# Patient Record
Sex: FEMALE | Race: WHITE | NOT HISPANIC OR LATINO | Employment: OTHER | RURAL
[De-identification: names, ages, dates, MRNs, and addresses within clinical notes are randomized per-mention and may not be internally consistent; named-entity substitution may affect disease eponyms.]

---

## 2015-09-21 LAB — BMD RECOMMENDATION EXT: NORMAL

## 2017-03-29 LAB — CRC RECOMMENDATION EXT: NORMAL

## 2017-03-31 ENCOUNTER — HISTORICAL (OUTPATIENT)
Dept: ADMINISTRATIVE | Facility: HOSPITAL | Age: 67
End: 2017-03-31

## 2017-04-03 LAB
LAB AP CLINICAL INFORMATION: NORMAL
LAB AP DIAGNOSIS - HISTORICAL: NORMAL
LAB AP GROSS PATHOLOGY - HISTORICAL: NORMAL
LAB AP SPECIMEN SUBMITTED - HISTORICAL: NORMAL

## 2020-03-09 LAB — BMD RECOMMENDATION EXT: NORMAL

## 2021-01-18 ENCOUNTER — HISTORICAL (OUTPATIENT)
Dept: ADMINISTRATIVE | Facility: HOSPITAL | Age: 71
End: 2021-01-18

## 2021-04-28 LAB — BCS RECOMMENDATION EXT: NORMAL

## 2021-08-02 ENCOUNTER — OFFICE VISIT (OUTPATIENT)
Dept: FAMILY MEDICINE | Facility: CLINIC | Age: 71
End: 2021-08-02
Payer: MEDICARE

## 2021-08-02 VITALS
HEART RATE: 90 BPM | DIASTOLIC BLOOD PRESSURE: 69 MMHG | SYSTOLIC BLOOD PRESSURE: 130 MMHG | TEMPERATURE: 98 F | OXYGEN SATURATION: 96 %

## 2021-08-02 DIAGNOSIS — Z20.822 CONTACT WITH AND (SUSPECTED) EXPOSURE TO COVID-19: Primary | ICD-10-CM

## 2021-08-02 DIAGNOSIS — J06.9 UPPER RESPIRATORY TRACT INFECTION, UNSPECIFIED TYPE: ICD-10-CM

## 2021-08-02 DIAGNOSIS — Z03.818 ENCNTR FOR OBS FOR SUSP EXPSR TO OTH BIOLG AGENTS RULED OUT: ICD-10-CM

## 2021-08-02 DIAGNOSIS — J02.9 SORE THROAT: ICD-10-CM

## 2021-08-02 LAB
CTP QC/QA: YES
CTP QC/QA: YES
FLUAV AG NPH QL: NEGATIVE
FLUBV AG NPH QL: NEGATIVE
S PYO RRNA THROAT QL PROBE: NEGATIVE
SARS-COV-2 AG RESP QL IA.RAPID: NEGATIVE

## 2021-08-02 PROCEDURE — 87635 SARS-COV-2 (COVID-19) QUALITATIVE PCR: ICD-10-PCS | Mod: ,,, | Performed by: CLINICAL MEDICAL LABORATORY

## 2021-08-02 PROCEDURE — 87428 SARSCOV & INF VIR A&B AG IA: CPT | Mod: RHCUB | Performed by: FAMILY MEDICINE

## 2021-08-02 PROCEDURE — 87635 SARS-COV-2 COVID-19 AMP PRB: CPT | Mod: ,,, | Performed by: CLINICAL MEDICAL LABORATORY

## 2021-08-02 PROCEDURE — 99213 PR OFFICE/OUTPT VISIT, EST, LEVL III, 20-29 MIN: ICD-10-PCS | Mod: ,,, | Performed by: FAMILY MEDICINE

## 2021-08-02 PROCEDURE — 87880 STREP A ASSAY W/OPTIC: CPT | Mod: RHCUB | Performed by: FAMILY MEDICINE

## 2021-08-02 PROCEDURE — 99213 OFFICE O/P EST LOW 20 MIN: CPT | Mod: ,,, | Performed by: FAMILY MEDICINE

## 2021-08-02 RX ORDER — CILOSTAZOL 50 MG/1
100 TABLET ORAL 2 TIMES DAILY
COMMUNITY
Start: 2021-07-15 | End: 2022-04-05

## 2021-08-02 RX ORDER — ATORVASTATIN CALCIUM 80 MG/1
80 TABLET, FILM COATED ORAL DAILY
COMMUNITY
Start: 2021-05-27 | End: 2022-04-05 | Stop reason: SDUPTHER

## 2021-08-02 RX ORDER — AZITHROMYCIN 250 MG/1
TABLET, FILM COATED ORAL
Qty: 6 TABLET | Refills: 0 | Status: SHIPPED | OUTPATIENT
Start: 2021-08-02 | End: 2021-08-07

## 2021-08-02 RX ORDER — POTASSIUM CHLORIDE 1500 MG/1
20 TABLET, EXTENDED RELEASE ORAL DAILY
COMMUNITY
Start: 2021-06-28 | End: 2022-04-05 | Stop reason: SDUPTHER

## 2021-08-02 RX ORDER — ESCITALOPRAM OXALATE 20 MG/1
20 TABLET ORAL DAILY
COMMUNITY
Start: 2021-06-14 | End: 2021-10-05 | Stop reason: SDUPTHER

## 2021-08-02 RX ORDER — CARVEDILOL 6.25 MG/1
6.25 TABLET ORAL 2 TIMES DAILY
COMMUNITY
Start: 2021-06-28 | End: 2022-04-05 | Stop reason: SDUPTHER

## 2021-08-02 RX ORDER — VALSARTAN 80 MG/1
80 TABLET ORAL DAILY
COMMUNITY
Start: 2021-06-14 | End: 2022-04-05 | Stop reason: SDUPTHER

## 2021-08-02 RX ORDER — METFORMIN HYDROCHLORIDE 500 MG/1
500 TABLET, EXTENDED RELEASE ORAL 2 TIMES DAILY WITH MEALS
COMMUNITY
Start: 2021-07-01 | End: 2021-10-05 | Stop reason: SDUPTHER

## 2021-08-02 RX ORDER — NAPROXEN SODIUM 220 MG/1
TABLET, FILM COATED ORAL
COMMUNITY
Start: 2020-09-25 | End: 2022-05-16 | Stop reason: SDUPTHER

## 2021-08-02 RX ORDER — OMEPRAZOLE 20 MG/1
20 CAPSULE, DELAYED RELEASE ORAL DAILY
COMMUNITY
Start: 2021-05-15 | End: 2022-04-05 | Stop reason: SDUPTHER

## 2021-08-02 RX ORDER — FUROSEMIDE 40 MG/1
40 TABLET ORAL DAILY PRN
COMMUNITY
Start: 2021-07-15 | End: 2022-04-05 | Stop reason: SDUPTHER

## 2021-08-03 PROBLEM — E78.2 MIXED HYPERLIPIDEMIA: Status: ACTIVE | Noted: 2021-08-03

## 2021-08-03 PROBLEM — I10 ESSENTIAL HYPERTENSION: Status: ACTIVE | Noted: 2021-08-03

## 2021-08-03 PROBLEM — K21.9 GASTROESOPHAGEAL REFLUX DISEASE WITHOUT ESOPHAGITIS: Status: ACTIVE | Noted: 2021-08-03

## 2021-08-03 PROBLEM — E11.9 TYPE 2 DIABETES MELLITUS WITHOUT COMPLICATION, WITHOUT LONG-TERM CURRENT USE OF INSULIN: Status: ACTIVE | Noted: 2021-08-03

## 2021-08-03 LAB — SARS-COV-2 RNA RESP QL NAA+PROBE: NEGATIVE

## 2021-10-05 ENCOUNTER — OFFICE VISIT (OUTPATIENT)
Dept: FAMILY MEDICINE | Facility: CLINIC | Age: 71
End: 2021-10-05
Payer: MEDICARE

## 2021-10-05 VITALS
BODY MASS INDEX: 29.73 KG/M2 | RESPIRATION RATE: 16 BRPM | DIASTOLIC BLOOD PRESSURE: 64 MMHG | TEMPERATURE: 97 F | SYSTOLIC BLOOD PRESSURE: 122 MMHG | OXYGEN SATURATION: 94 % | WEIGHT: 185 LBS | HEART RATE: 120 BPM | HEIGHT: 66 IN

## 2021-10-05 DIAGNOSIS — R05.9 COUGH: ICD-10-CM

## 2021-10-05 DIAGNOSIS — J30.1 SEASONAL ALLERGIC RHINITIS DUE TO POLLEN: ICD-10-CM

## 2021-10-05 DIAGNOSIS — R68.83 CHILLS: ICD-10-CM

## 2021-10-05 DIAGNOSIS — I73.9 PAD (PERIPHERAL ARTERY DISEASE): ICD-10-CM

## 2021-10-05 DIAGNOSIS — E78.5 HYPERLIPIDEMIA, UNSPECIFIED HYPERLIPIDEMIA TYPE: ICD-10-CM

## 2021-10-05 DIAGNOSIS — Z20.822 SUSPECTED COVID-19 VIRUS INFECTION: ICD-10-CM

## 2021-10-05 DIAGNOSIS — F32.A DEPRESSION, UNSPECIFIED DEPRESSION TYPE: ICD-10-CM

## 2021-10-05 DIAGNOSIS — E11.9 TYPE 2 DIABETES MELLITUS WITHOUT COMPLICATION, WITHOUT LONG-TERM CURRENT USE OF INSULIN: Primary | ICD-10-CM

## 2021-10-05 DIAGNOSIS — I10 PRIMARY HYPERTENSION: ICD-10-CM

## 2021-10-05 LAB
ALBUMIN SERPL BCP-MCNC: 3.3 G/DL (ref 3.5–5)
ALP SERPL-CCNC: 128 U/L (ref 55–142)
ALT SERPL W P-5'-P-CCNC: 21 U/L (ref 13–56)
ANION GAP SERPL CALCULATED.3IONS-SCNC: 9 MMOL/L (ref 7–16)
AST SERPL W P-5'-P-CCNC: 21 U/L (ref 15–37)
BILIRUB DIRECT SERPL-MCNC: 0.1 MG/DL (ref 0–0.2)
BILIRUB SERPL-MCNC: 0.3 MG/DL (ref 0–1.2)
BUN SERPL-MCNC: 12 MG/DL (ref 7–18)
BUN/CREAT SERPL: 13 (ref 6–20)
CALCIUM SERPL-MCNC: 9.1 MG/DL (ref 8.5–10.1)
CHLORIDE SERPL-SCNC: 107 MMOL/L (ref 98–107)
CHOLEST SERPL-MCNC: 126 MG/DL (ref 0–200)
CHOLEST/HDLC SERPL: 1.8 {RATIO}
CO2 SERPL-SCNC: 27 MMOL/L (ref 21–32)
CREAT SERPL-MCNC: 0.94 MG/DL (ref 0.55–1.02)
CTP QC/QA: YES
EST. AVERAGE GLUCOSE BLD GHB EST-MCNC: 184 MG/DL
FLUAV AG NPH QL: NEGATIVE
FLUBV AG NPH QL: NEGATIVE
GLUCOSE SERPL-MCNC: 274 MG/DL (ref 74–106)
HBA1C MFR BLD HPLC: 8.1 % (ref 4.5–6.6)
HDLC SERPL-MCNC: 72 MG/DL (ref 40–60)
LDLC SERPL CALC-MCNC: 26 MG/DL
LDLC/HDLC SERPL: 0.4 {RATIO}
NONHDLC SERPL-MCNC: 54 MG/DL
POTASSIUM SERPL-SCNC: 3.5 MMOL/L (ref 3.5–5.1)
PROT SERPL-MCNC: 6.9 G/DL (ref 6.4–8.2)
SARS-COV-2 AG RESP QL IA.RAPID: NEGATIVE
SODIUM SERPL-SCNC: 139 MMOL/L (ref 136–145)
TRIGL SERPL-MCNC: 139 MG/DL (ref 35–150)
VLDLC SERPL-MCNC: 28 MG/DL

## 2021-10-05 PROCEDURE — 80053 COMPREHEN METABOLIC PANEL: CPT | Mod: ,,, | Performed by: CLINICAL MEDICAL LABORATORY

## 2021-10-05 PROCEDURE — 99214 PR OFFICE/OUTPT VISIT, EST, LEVL IV, 30-39 MIN: ICD-10-PCS | Mod: ,,, | Performed by: INTERNAL MEDICINE

## 2021-10-05 PROCEDURE — 82248 HEPATIC FUNCTION PANEL: ICD-10-PCS | Mod: ,,, | Performed by: CLINICAL MEDICAL LABORATORY

## 2021-10-05 PROCEDURE — 80061 LIPID PANEL: ICD-10-PCS | Mod: ,,, | Performed by: CLINICAL MEDICAL LABORATORY

## 2021-10-05 PROCEDURE — 87428 SARSCOV & INF VIR A&B AG IA: CPT | Mod: RHCUB | Performed by: INTERNAL MEDICINE

## 2021-10-05 PROCEDURE — 83036 HEMOGLOBIN A1C: ICD-10-PCS | Mod: ,,, | Performed by: CLINICAL MEDICAL LABORATORY

## 2021-10-05 PROCEDURE — 82248 BILIRUBIN DIRECT: CPT | Mod: ,,, | Performed by: CLINICAL MEDICAL LABORATORY

## 2021-10-05 PROCEDURE — 80061 LIPID PANEL: CPT | Mod: ,,, | Performed by: CLINICAL MEDICAL LABORATORY

## 2021-10-05 PROCEDURE — 99214 OFFICE O/P EST MOD 30 MIN: CPT | Mod: ,,, | Performed by: INTERNAL MEDICINE

## 2021-10-05 PROCEDURE — 80053 HEPATIC FUNCTION PANEL: ICD-10-PCS | Mod: ,,, | Performed by: CLINICAL MEDICAL LABORATORY

## 2021-10-05 PROCEDURE — 83036 HEMOGLOBIN GLYCOSYLATED A1C: CPT | Mod: ,,, | Performed by: CLINICAL MEDICAL LABORATORY

## 2021-10-05 RX ORDER — METFORMIN HYDROCHLORIDE 500 MG/1
500 TABLET, EXTENDED RELEASE ORAL 2 TIMES DAILY WITH MEALS
Qty: 180 TABLET | Refills: 1 | Status: SHIPPED | OUTPATIENT
Start: 2021-10-05 | End: 2022-03-28 | Stop reason: SDUPTHER

## 2021-10-05 RX ORDER — ESCITALOPRAM OXALATE 20 MG/1
20 TABLET ORAL DAILY
Qty: 90 TABLET | Refills: 1 | Status: SHIPPED | OUTPATIENT
Start: 2021-10-05 | End: 2022-03-28 | Stop reason: SDUPTHER

## 2021-10-05 RX ORDER — METHYLPREDNISOLONE 4 MG/1
TABLET ORAL
Qty: 1 PACKAGE | Refills: 0 | Status: SHIPPED | OUTPATIENT
Start: 2021-10-05 | End: 2022-04-05 | Stop reason: ALTCHOICE

## 2022-03-11 DIAGNOSIS — Z71.89 COMPLEX CARE COORDINATION: ICD-10-CM

## 2022-03-28 DIAGNOSIS — F32.A DEPRESSION, UNSPECIFIED DEPRESSION TYPE: ICD-10-CM

## 2022-03-28 DIAGNOSIS — E11.9 TYPE 2 DIABETES MELLITUS WITHOUT COMPLICATION, WITHOUT LONG-TERM CURRENT USE OF INSULIN: ICD-10-CM

## 2022-03-28 RX ORDER — ESCITALOPRAM OXALATE 20 MG/1
20 TABLET ORAL DAILY
Qty: 90 TABLET | Refills: 1 | Status: SHIPPED | OUTPATIENT
Start: 2022-03-28 | End: 2022-04-05 | Stop reason: SDUPTHER

## 2022-03-28 RX ORDER — METFORMIN HYDROCHLORIDE 500 MG/1
500 TABLET, EXTENDED RELEASE ORAL 2 TIMES DAILY WITH MEALS
Qty: 180 TABLET | Refills: 1 | Status: SHIPPED | OUTPATIENT
Start: 2022-03-28 | End: 2022-04-05 | Stop reason: SDUPTHER

## 2022-04-05 ENCOUNTER — OFFICE VISIT (OUTPATIENT)
Dept: FAMILY MEDICINE | Facility: CLINIC | Age: 72
End: 2022-04-05
Payer: MEDICARE

## 2022-04-05 VITALS
BODY MASS INDEX: 30.67 KG/M2 | SYSTOLIC BLOOD PRESSURE: 138 MMHG | HEART RATE: 116 BPM | DIASTOLIC BLOOD PRESSURE: 70 MMHG | RESPIRATION RATE: 16 BRPM | TEMPERATURE: 97 F | OXYGEN SATURATION: 96 % | WEIGHT: 190 LBS

## 2022-04-05 DIAGNOSIS — Z87.891 PERSONAL HISTORY OF NICOTINE DEPENDENCE: ICD-10-CM

## 2022-04-05 DIAGNOSIS — F17.200 SMOKER: ICD-10-CM

## 2022-04-05 DIAGNOSIS — Z86.010 HISTORY OF COLON POLYPS: ICD-10-CM

## 2022-04-05 DIAGNOSIS — Z11.59 ENCOUNTER FOR HEPATITIS C SCREENING TEST FOR LOW RISK PATIENT: ICD-10-CM

## 2022-04-05 DIAGNOSIS — E11.9 TYPE 2 DIABETES MELLITUS WITHOUT COMPLICATION, WITHOUT LONG-TERM CURRENT USE OF INSULIN: Primary | ICD-10-CM

## 2022-04-05 DIAGNOSIS — E87.6 HYPOKALEMIA: ICD-10-CM

## 2022-04-05 DIAGNOSIS — I25.10 CORONARY ARTERY DISEASE INVOLVING NATIVE CORONARY ARTERY OF NATIVE HEART WITHOUT ANGINA PECTORIS: ICD-10-CM

## 2022-04-05 DIAGNOSIS — K21.9 GASTROESOPHAGEAL REFLUX DISEASE WITHOUT ESOPHAGITIS: ICD-10-CM

## 2022-04-05 DIAGNOSIS — F32.A DEPRESSION, UNSPECIFIED DEPRESSION TYPE: ICD-10-CM

## 2022-04-05 DIAGNOSIS — I10 ESSENTIAL HYPERTENSION: ICD-10-CM

## 2022-04-05 DIAGNOSIS — E78.5 HYPERLIPIDEMIA, UNSPECIFIED HYPERLIPIDEMIA TYPE: ICD-10-CM

## 2022-04-05 LAB
CREAT UR-MCNC: 108 MG/DL (ref 28–219)
EST. AVERAGE GLUCOSE BLD GHB EST-MCNC: 157 MG/DL
HBA1C MFR BLD HPLC: 7.3 % (ref 4.5–6.6)
MICROALBUMIN UR-MCNC: 1.2 MG/DL (ref 0–2.8)
MICROALBUMIN/CREAT RATIO PNL UR: 11.1 MG/G (ref 0–30)

## 2022-04-05 PROCEDURE — 82043 MICROALBUMIN / CREATININE RATIO URINE: ICD-10-PCS | Mod: ,,, | Performed by: CLINICAL MEDICAL LABORATORY

## 2022-04-05 PROCEDURE — 86803 HEPATITIS C AB TEST: CPT | Mod: ,,, | Performed by: CLINICAL MEDICAL LABORATORY

## 2022-04-05 PROCEDURE — 99214 OFFICE O/P EST MOD 30 MIN: CPT | Mod: ,,, | Performed by: INTERNAL MEDICINE

## 2022-04-05 PROCEDURE — 82570 MICROALBUMIN / CREATININE RATIO URINE: ICD-10-PCS | Mod: ,,, | Performed by: CLINICAL MEDICAL LABORATORY

## 2022-04-05 PROCEDURE — 83036 HEMOGLOBIN A1C: ICD-10-PCS | Mod: ,,, | Performed by: CLINICAL MEDICAL LABORATORY

## 2022-04-05 PROCEDURE — 86803 HEPATITIS C ANTIBODY: ICD-10-PCS | Mod: ,,, | Performed by: CLINICAL MEDICAL LABORATORY

## 2022-04-05 PROCEDURE — 82570 ASSAY OF URINE CREATININE: CPT | Mod: ,,, | Performed by: CLINICAL MEDICAL LABORATORY

## 2022-04-05 PROCEDURE — 83036 HEMOGLOBIN GLYCOSYLATED A1C: CPT | Mod: ,,, | Performed by: CLINICAL MEDICAL LABORATORY

## 2022-04-05 PROCEDURE — 99214 PR OFFICE/OUTPT VISIT, EST, LEVL IV, 30-39 MIN: ICD-10-PCS | Mod: ,,, | Performed by: INTERNAL MEDICINE

## 2022-04-05 PROCEDURE — 82043 UR ALBUMIN QUANTITATIVE: CPT | Mod: ,,, | Performed by: CLINICAL MEDICAL LABORATORY

## 2022-04-05 RX ORDER — SPIRONOLACTONE 25 MG/1
25 TABLET ORAL DAILY PRN
COMMUNITY
Start: 2022-02-23 | End: 2022-04-05 | Stop reason: SDUPTHER

## 2022-04-05 RX ORDER — OMEPRAZOLE 20 MG/1
20 CAPSULE, DELAYED RELEASE ORAL DAILY
Qty: 90 CAPSULE | Refills: 1 | Status: SHIPPED | OUTPATIENT
Start: 2022-04-05 | End: 2023-04-12 | Stop reason: SDUPTHER

## 2022-04-05 RX ORDER — CARVEDILOL 6.25 MG/1
6.25 TABLET ORAL 2 TIMES DAILY
Qty: 180 TABLET | Refills: 1 | Status: SHIPPED | OUTPATIENT
Start: 2022-04-05 | End: 2023-04-12 | Stop reason: SDUPTHER

## 2022-04-05 RX ORDER — METFORMIN HYDROCHLORIDE 500 MG/1
500 TABLET, EXTENDED RELEASE ORAL 2 TIMES DAILY WITH MEALS
Qty: 180 TABLET | Refills: 1 | Status: SHIPPED | OUTPATIENT
Start: 2022-04-05 | End: 2023-04-12 | Stop reason: SDUPTHER

## 2022-04-05 RX ORDER — POTASSIUM CHLORIDE 1500 MG/1
20 TABLET, EXTENDED RELEASE ORAL DAILY
Qty: 90 TABLET | Refills: 1 | Status: SHIPPED | OUTPATIENT
Start: 2022-04-05 | End: 2023-04-12 | Stop reason: SDUPTHER

## 2022-04-05 RX ORDER — CILOSTAZOL 100 MG/1
100 TABLET ORAL 2 TIMES DAILY
Qty: 180 TABLET | Refills: 1 | Status: SHIPPED | OUTPATIENT
Start: 2022-04-05 | End: 2023-04-12 | Stop reason: SDUPTHER

## 2022-04-05 RX ORDER — CILOSTAZOL 100 MG/1
100 TABLET ORAL 2 TIMES DAILY
COMMUNITY
Start: 2022-03-14 | End: 2022-04-05 | Stop reason: SDUPTHER

## 2022-04-05 RX ORDER — VALSARTAN 80 MG/1
80 TABLET ORAL DAILY
Qty: 90 TABLET | Refills: 1 | Status: SHIPPED | OUTPATIENT
Start: 2022-04-05 | End: 2023-04-12 | Stop reason: SDUPTHER

## 2022-04-05 RX ORDER — ATORVASTATIN CALCIUM 80 MG/1
80 TABLET, FILM COATED ORAL DAILY
Qty: 90 TABLET | Refills: 1 | Status: SHIPPED | OUTPATIENT
Start: 2022-04-05 | End: 2023-04-12 | Stop reason: SDUPTHER

## 2022-04-05 RX ORDER — SPIRONOLACTONE 25 MG/1
25 TABLET ORAL DAILY PRN
Qty: 90 TABLET | Refills: 1 | Status: SHIPPED | OUTPATIENT
Start: 2022-04-05

## 2022-04-05 RX ORDER — FUROSEMIDE 40 MG/1
40 TABLET ORAL DAILY PRN
Qty: 90 TABLET | Refills: 1 | Status: SHIPPED | OUTPATIENT
Start: 2022-04-05 | End: 2023-04-12

## 2022-04-05 RX ORDER — ESCITALOPRAM OXALATE 20 MG/1
20 TABLET ORAL DAILY
Qty: 90 TABLET | Refills: 1 | Status: SHIPPED | OUTPATIENT
Start: 2022-04-05 | End: 2023-04-12 | Stop reason: SDUPTHER

## 2022-04-05 NOTE — PROGRESS NOTES
New Clinic Note    Patient Name:  Maddie Ya is a 71 y.o. female     Chief Complaint:    Chief Complaint   Patient presents with    Follow-up     Patient is here for her checkup today. She asks about getting a low-dose lung CT scheduled since she is a smoker.     Did not bring meds        Subjective  HPI         Current Outpatient Medications:     aspirin 81 MG Chew, aspirin 81 mg chewable tablet  Chew 1 tablet every day by oral route., Disp: , Rfl:     atorvastatin (LIPITOR) 80 MG tablet, Take 1 tablet (80 mg total) by mouth once daily., Disp: 90 tablet, Rfl: 1    carvediloL (COREG) 6.25 MG tablet, Take 1 tablet (6.25 mg total) by mouth 2 (two) times daily., Disp: 180 tablet, Rfl: 1    cilostazoL (PLETAL) 100 MG Tab, Take 1 tablet (100 mg total) by mouth 2 (two) times daily., Disp: 180 tablet, Rfl: 1    EScitalopram oxalate (LEXAPRO) 20 MG tablet, Take 1 tablet (20 mg total) by mouth once daily., Disp: 90 tablet, Rfl: 1    furosemide (LASIX) 40 MG tablet, Take 1 tablet (40 mg total) by mouth daily as needed (swelling)., Disp: 90 tablet, Rfl: 1    metFORMIN (GLUCOPHAGE-XR) 500 MG ER 24hr tablet, Take 1 tablet (500 mg total) by mouth 2 (two) times daily with meals., Disp: 180 tablet, Rfl: 1    omeprazole (PRILOSEC) 20 MG capsule, Take 1 capsule (20 mg total) by mouth once daily., Disp: 90 capsule, Rfl: 1    potassium chloride (K-TAB) 20 mEq, Take 1 tablet (20 mEq total) by mouth once daily., Disp: 90 tablet, Rfl: 1    spironolactone (ALDACTONE) 25 MG tablet, Take 1 tablet (25 mg total) by mouth daily as needed (swelling)., Disp: 90 tablet, Rfl: 1    valsartan (DIOVAN) 80 MG tablet, Take 1 tablet (80 mg total) by mouth once daily., Disp: 90 tablet, Rfl: 1   Past Medical History:   Diagnosis Date    B12 deficiency     Bilateral carotid bruits     CAD (coronary artery disease)     sees Halina    CHF (congestive heart failure)     CVA (cerebral vascular accident)     Depression     Diabetes  mellitus, type 2     GERD (gastroesophageal reflux disease)     History of colon polyps     Hyperlipidemia     Hypertension     Ischemic cardiomyopathy     Lumbar disc disease     OA (osteoarthritis)     PAD (peripheral artery disease)       Past Surgical History:   Procedure Laterality Date    ANKLE SURGERY      APPENDECTOMY      BACK SURGERY      CHOLECYSTECTOMY      HYSTERECTOMY      TONSILLECTOMY        History reviewed. No pertinent family history.   Social History     Tobacco Use    Smoking status: Current Every Day Smoker    Smokeless tobacco: Never Used   Substance Use Topics    Alcohol use: Never    Drug use: Never        Review of Systems   Constitutional: Negative for fatigue and fever.   HENT: Negative for nasal congestion, rhinorrhea and sore throat.    Eyes: Negative for visual disturbance.   Respiratory: Negative for cough, chest tightness, shortness of breath and wheezing.    Cardiovascular: Negative for chest pain, palpitations and leg swelling.   Gastrointestinal: Negative for abdominal pain, blood in stool, nausea and vomiting.   Genitourinary: Negative for dysuria and hematuria.   Musculoskeletal: Negative for back pain and neck pain.   Integumentary:  Negative for rash and mole/lesion.   Neurological: Negative for dizziness, vertigo, headaches and memory loss.   Hematological: Negative for adenopathy.   Psychiatric/Behavioral: Negative for confusion. The patient is not nervous/anxious.         Objective:  /70 (BP Location: Left arm, Patient Position: Sitting)   Pulse (!) 116   Temp 97 °F (36.1 °C) (Temporal)   Resp 16   Wt 86.2 kg (190 lb)   SpO2 96%   BMI 30.67 kg/m²      Physical Exam  Constitutional:       Appearance: Normal appearance.   HENT:      Head: Normocephalic and atraumatic.      Right Ear: External ear normal.      Left Ear: External ear normal.      Nose: Nose normal.   Eyes:      Extraocular Movements: Extraocular movements intact.       Conjunctiva/sclera: Conjunctivae normal.      Pupils: Pupils are equal, round, and reactive to light.   Cardiovascular:      Rate and Rhythm: Regular rhythm. Tachycardia present.      Pulses: Normal pulses.      Heart sounds: Normal heart sounds. No murmur heard.    No friction rub. No gallop.   Pulmonary:      Effort: Pulmonary effort is normal.      Breath sounds: No wheezing, rhonchi or rales.   Abdominal:      General: Bowel sounds are normal.      Palpations: There is no mass.      Tenderness: There is no abdominal tenderness.   Musculoskeletal:         General: No swelling.      Cervical back: Normal range of motion and neck supple.   Skin:     General: Skin is warm.      Findings: No lesion or rash.   Neurological:      General: No focal deficit present.      Mental Status: She is alert and oriented to person, place, and time.   Psychiatric:         Mood and Affect: Mood normal.          Assessment and Plan    Type 2 diabetes mellitus without complication, without long-term current use of insulin  -     metFORMIN (GLUCOPHAGE-XR) 500 MG ER 24hr tablet; Take 1 tablet (500 mg total) by mouth 2 (two) times daily with meals.  Dispense: 180 tablet; Refill: 1  -     Hemoglobin A1C; Future; Expected date: 04/05/2022  -     Microalbumin/Creatinine Ratio, Urine    Depression, unspecified depression type  -     EScitalopram oxalate (LEXAPRO) 20 MG tablet; Take 1 tablet (20 mg total) by mouth once daily.  Dispense: 90 tablet; Refill: 1    Essential hypertension  -     valsartan (DIOVAN) 80 MG tablet; Take 1 tablet (80 mg total) by mouth once daily.  Dispense: 90 tablet; Refill: 1  -     spironolactone (ALDACTONE) 25 MG tablet; Take 1 tablet (25 mg total) by mouth daily as needed (swelling).  Dispense: 90 tablet; Refill: 1  -     furosemide (LASIX) 40 MG tablet; Take 1 tablet (40 mg total) by mouth daily as needed (swelling).  Dispense: 90 tablet; Refill: 1  -     carvediloL (COREG) 6.25 MG tablet; Take 1 tablet (6.25 mg  total) by mouth 2 (two) times daily.  Dispense: 180 tablet; Refill: 1    Hyperlipidemia, unspecified hyperlipidemia type  -     atorvastatin (LIPITOR) 80 MG tablet; Take 1 tablet (80 mg total) by mouth once daily.  Dispense: 90 tablet; Refill: 1    Coronary artery disease involving native coronary artery of native heart without angina pectoris  -     cilostazoL (PLETAL) 100 MG Tab; Take 1 tablet (100 mg total) by mouth 2 (two) times daily.  Dispense: 180 tablet; Refill: 1    Gastroesophageal reflux disease without esophagitis  -     omeprazole (PRILOSEC) 20 MG capsule; Take 1 capsule (20 mg total) by mouth once daily.  Dispense: 90 capsule; Refill: 1    Hypokalemia  -     potassium chloride (K-TAB) 20 mEq; Take 1 tablet (20 mEq total) by mouth once daily.  Dispense: 90 tablet; Refill: 1    History of colon polyps  -     Ambulatory referral/consult to Gastroenterology; Future; Expected date: 04/12/2022    Encounter for hepatitis C screening test for low risk patient  -     Hepatitis C Antibody; Future; Expected date: 04/05/2022    Smoker    Personal history of nicotine dependence   -     CT Chest Lung Screening Low Dose; Future; Expected date: 04/05/2022         Problem List Items Addressed This Visit        Psychiatric    Depression    Relevant Medications    EScitalopram oxalate (LEXAPRO) 20 MG tablet       Cardiac/Vascular    Essential hypertension    Relevant Medications    valsartan (DIOVAN) 80 MG tablet    spironolactone (ALDACTONE) 25 MG tablet    furosemide (LASIX) 40 MG tablet    carvediloL (COREG) 6.25 MG tablet    Hyperlipidemia    Relevant Medications    atorvastatin (LIPITOR) 80 MG tablet    CAD (coronary artery disease)    Relevant Medications    cilostazoL (PLETAL) 100 MG Tab       Endocrine    Type 2 diabetes mellitus without complication, without long-term current use of insulin - Primary    Relevant Medications    metFORMIN (GLUCOPHAGE-XR) 500 MG ER 24hr tablet    Other Relevant Orders    Hemoglobin  A1C    Microalbumin/Creatinine Ratio, Urine       GI    Gastroesophageal reflux disease without esophagitis    Relevant Medications    omeprazole (PRILOSEC) 20 MG capsule    History of colon polyps    Relevant Orders    Ambulatory referral/consult to Gastroenterology      Other Visit Diagnoses     Hypokalemia        Relevant Medications    potassium chloride (K-TAB) 20 mEq    Encounter for hepatitis C screening test for low risk patient        Relevant Orders    Hepatitis C Antibody    Smoker        Personal history of nicotine dependence         Relevant Orders    CT Chest Lung Screening Low Dose         Get cardiology records from recent visit   HEDY due in 2025  Had polyps on C-scope 2017 and has a fam hx of colon cancer  Follow up in about 6 months (around 10/5/2022).

## 2022-04-06 LAB — HCV AB SER QL: NORMAL

## 2022-05-10 ENCOUNTER — HOSPITAL ENCOUNTER (OUTPATIENT)
Dept: RADIOLOGY | Facility: HOSPITAL | Age: 72
Discharge: HOME | End: 2022-05-10
Attending: INTERNAL MEDICINE
Payer: MEDICARE

## 2022-05-10 DIAGNOSIS — Z87.891 PERSONAL HISTORY OF NICOTINE DEPENDENCE: ICD-10-CM

## 2022-05-10 PROCEDURE — 71271 CT CHEST LUNG SCREENING LOW DOSE: ICD-10-PCS | Mod: 26,,, | Performed by: RADIOLOGY

## 2022-05-10 PROCEDURE — 71271 CT THORAX LUNG CANCER SCR C-: CPT | Mod: TC

## 2022-05-10 PROCEDURE — 71271 CT THORAX LUNG CANCER SCR C-: CPT | Mod: 26,,, | Performed by: RADIOLOGY

## 2022-05-16 ENCOUNTER — OFFICE VISIT (OUTPATIENT)
Dept: FAMILY MEDICINE | Facility: CLINIC | Age: 72
End: 2022-05-16
Payer: MEDICARE

## 2022-05-16 VITALS
WEIGHT: 186.19 LBS | HEIGHT: 66 IN | TEMPERATURE: 99 F | BODY MASS INDEX: 29.92 KG/M2 | DIASTOLIC BLOOD PRESSURE: 70 MMHG | HEART RATE: 100 BPM | SYSTOLIC BLOOD PRESSURE: 112 MMHG | OXYGEN SATURATION: 97 % | RESPIRATION RATE: 19 BRPM

## 2022-05-16 DIAGNOSIS — R50.9 FEVER, UNSPECIFIED FEVER CAUSE: Primary | ICD-10-CM

## 2022-05-16 PROBLEM — E78.5 DYSLIPIDEMIA: Status: ACTIVE | Noted: 2022-05-16

## 2022-05-16 PROBLEM — I73.9 PERIPHERAL VASCULAR DISEASE: Status: ACTIVE | Noted: 2022-05-16

## 2022-05-16 PROBLEM — L57.0 ACTINIC KERATOSES: Status: ACTIVE | Noted: 2019-03-07

## 2022-05-16 PROBLEM — J44.9 MILD CHRONIC OBSTRUCTIVE PULMONARY DISEASE: Status: ACTIVE | Noted: 2022-05-16

## 2022-05-16 PROBLEM — Z87.39 HISTORY OF DEGENERATIVE DISC DISEASE: Status: ACTIVE | Noted: 2019-03-07

## 2022-05-16 LAB
ALBUMIN SERPL BCP-MCNC: 3.2 G/DL (ref 3.5–5)
ALBUMIN/GLOB SERPL: 1 {RATIO}
ALP SERPL-CCNC: 114 U/L (ref 55–142)
ALT SERPL W P-5'-P-CCNC: 21 U/L (ref 13–56)
ANION GAP SERPL CALCULATED.3IONS-SCNC: 8 MMOL/L (ref 7–16)
AST SERPL W P-5'-P-CCNC: 18 U/L (ref 15–37)
BASOPHILS # BLD AUTO: 0.03 K/UL (ref 0–0.2)
BASOPHILS NFR BLD AUTO: 0.4 % (ref 0–1)
BILIRUB SERPL-MCNC: 0.3 MG/DL (ref 0–1.2)
BUN SERPL-MCNC: 11 MG/DL (ref 7–18)
BUN/CREAT SERPL: 16 (ref 6–20)
CALCIUM SERPL-MCNC: 8.3 MG/DL (ref 8.5–10.1)
CHLORIDE SERPL-SCNC: 108 MMOL/L (ref 98–107)
CO2 SERPL-SCNC: 28 MMOL/L (ref 21–32)
CREAT SERPL-MCNC: 0.7 MG/DL (ref 0.55–1.02)
CTP QC/QA: YES
CTP QC/QA: YES
DIFFERENTIAL METHOD BLD: ABNORMAL
EOSINOPHIL # BLD AUTO: 0.1 K/UL (ref 0–0.5)
EOSINOPHIL NFR BLD AUTO: 1.3 % (ref 1–4)
ERYTHROCYTE [DISTWIDTH] IN BLOOD BY AUTOMATED COUNT: 18.9 % (ref 11.5–14.5)
FLUAV AG NPH QL: NEGATIVE
FLUBV AG NPH QL: NEGATIVE
GLOBULIN SER-MCNC: 3.2 G/DL (ref 2–4)
GLUCOSE SERPL-MCNC: 185 MG/DL (ref 74–106)
HCT VFR BLD AUTO: 34.1 % (ref 38–47)
HETEROPH AB SER QL LA: NEGATIVE
HGB BLD-MCNC: 10.3 G/DL (ref 12–16)
IMM GRANULOCYTES # BLD AUTO: 0.01 K/UL (ref 0–0.04)
IMM GRANULOCYTES NFR BLD: 0.1 % (ref 0–0.4)
LYMPHOCYTES # BLD AUTO: 2.61 K/UL (ref 1–4.8)
LYMPHOCYTES NFR BLD AUTO: 34.5 % (ref 27–41)
MCH RBC QN AUTO: 24 PG (ref 27–31)
MCHC RBC AUTO-ENTMCNC: 30.2 G/DL (ref 32–36)
MCV RBC AUTO: 79.3 FL (ref 80–96)
MONOCYTES # BLD AUTO: 0.68 K/UL (ref 0–0.8)
MONOCYTES NFR BLD AUTO: 9 % (ref 2–6)
MPC BLD CALC-MCNC: 9.6 FL (ref 9.4–12.4)
NEUTROPHILS # BLD AUTO: 4.13 K/UL (ref 1.8–7.7)
NEUTROPHILS NFR BLD AUTO: 54.7 % (ref 53–65)
NRBC # BLD AUTO: 0 X10E3/UL
NRBC, AUTO (.00): 0 %
PLATELET # BLD AUTO: 282 K/UL (ref 150–400)
POTASSIUM SERPL-SCNC: 3.9 MMOL/L (ref 3.5–5.1)
PROT SERPL-MCNC: 6.4 G/DL (ref 6.4–8.2)
RBC # BLD AUTO: 4.3 M/UL (ref 4.2–5.4)
S PYO RRNA THROAT QL PROBE: NEGATIVE
SARS-COV-2 AG RESP QL IA.RAPID: NEGATIVE
SODIUM SERPL-SCNC: 140 MMOL/L (ref 136–145)
WBC # BLD AUTO: 7.56 K/UL (ref 4.5–11)

## 2022-05-16 PROCEDURE — 87880 STREP A ASSAY W/OPTIC: CPT | Mod: RHCUB | Performed by: FAMILY MEDICINE

## 2022-05-16 PROCEDURE — 85025 CBC WITH DIFFERENTIAL: ICD-10-PCS | Mod: ,,, | Performed by: CLINICAL MEDICAL LABORATORY

## 2022-05-16 PROCEDURE — 99213 PR OFFICE/OUTPT VISIT, EST, LEVL III, 20-29 MIN: ICD-10-PCS | Mod: ,,, | Performed by: FAMILY MEDICINE

## 2022-05-16 PROCEDURE — 99213 OFFICE O/P EST LOW 20 MIN: CPT | Mod: ,,, | Performed by: FAMILY MEDICINE

## 2022-05-16 PROCEDURE — 86308 HETEROPHILE AB SCREEN: ICD-10-PCS | Mod: ,,, | Performed by: CLINICAL MEDICAL LABORATORY

## 2022-05-16 PROCEDURE — 80053 COMPREHENSIVE METABOLIC PANEL: ICD-10-PCS | Mod: ,,, | Performed by: CLINICAL MEDICAL LABORATORY

## 2022-05-16 PROCEDURE — 87428 SARSCOV & INF VIR A&B AG IA: CPT | Mod: RHCUB | Performed by: FAMILY MEDICINE

## 2022-05-16 PROCEDURE — 85025 COMPLETE CBC W/AUTO DIFF WBC: CPT | Mod: ,,, | Performed by: CLINICAL MEDICAL LABORATORY

## 2022-05-16 PROCEDURE — 80053 COMPREHEN METABOLIC PANEL: CPT | Mod: ,,, | Performed by: CLINICAL MEDICAL LABORATORY

## 2022-05-16 PROCEDURE — 86308 HETEROPHILE ANTIBODY SCREEN: CPT | Mod: ,,, | Performed by: CLINICAL MEDICAL LABORATORY

## 2022-05-16 RX ORDER — ASPIRIN 81 MG/1
1 TABLET ORAL DAILY
COMMUNITY
End: 2023-04-12 | Stop reason: ALTCHOICE

## 2022-05-16 RX ORDER — CYCLOBENZAPRINE HCL 10 MG
1 TABLET ORAL NIGHTLY PRN
COMMUNITY
End: 2022-06-09 | Stop reason: SDUPTHER

## 2022-05-16 RX ORDER — FLUTICASONE PROPIONATE 50 MCG
1 SPRAY, SUSPENSION (ML) NASAL DAILY
COMMUNITY
End: 2023-04-12 | Stop reason: SDUPTHER

## 2022-05-16 RX ORDER — CYANOCOBALAMIN 1000 UG/ML
1 INJECTION, SOLUTION INTRAMUSCULAR; SUBCUTANEOUS
COMMUNITY
End: 2022-05-23 | Stop reason: SDUPTHER

## 2022-05-16 RX ORDER — CETIRIZINE HYDROCHLORIDE 10 MG/1
1 TABLET ORAL DAILY
COMMUNITY
End: 2023-04-12 | Stop reason: SDUPTHER

## 2022-05-16 NOTE — PROGRESS NOTES
Clinic Note    Patient Name: Maddie Ya  : 1950  MRN: [id number]    HPI:    Chief Complaint   Patient presents with    Fever    Fatigue     Low grade fever and fatigue couple weeks     Ms. Maddie Ya is a 71 y.o. female who present to clinic today with CC of fever and fatigue X several weeks.  Patient reports TMax of 99.6 at home. Reports she has to check her temperature at work due to COVID-19. Reports this is the only time a low grade fever has been noted. Denies checking her temperature at home.  Reports granddaughter had mono several weeks ago.   Otherwise, without complaints.     Medications:  Current Outpatient Medications on File Prior to Visit   Medication Sig Dispense Refill    atorvastatin (LIPITOR) 80 MG tablet Take 1 tablet (80 mg total) by mouth once daily. 90 tablet 1    carvediloL (COREG) 6.25 MG tablet Take 1 tablet (6.25 mg total) by mouth 2 (two) times daily. 180 tablet 1    cilostazoL (PLETAL) 100 MG Tab Take 1 tablet (100 mg total) by mouth 2 (two) times daily. 180 tablet 1    EScitalopram oxalate (LEXAPRO) 20 MG tablet Take 1 tablet (20 mg total) by mouth once daily. 90 tablet 1    furosemide (LASIX) 40 MG tablet Take 1 tablet (40 mg total) by mouth daily as needed (swelling). 90 tablet 1    metFORMIN (GLUCOPHAGE-XR) 500 MG ER 24hr tablet Take 1 tablet (500 mg total) by mouth 2 (two) times daily with meals. 180 tablet 1    omeprazole (PRILOSEC) 20 MG capsule Take 1 capsule (20 mg total) by mouth once daily. 90 capsule 1    potassium chloride (K-TAB) 20 mEq Take 1 tablet (20 mEq total) by mouth once daily. 90 tablet 1    spironolactone (ALDACTONE) 25 MG tablet Take 1 tablet (25 mg total) by mouth daily as needed (swelling). 90 tablet 1    valsartan (DIOVAN) 80 MG tablet Take 1 tablet (80 mg total) by mouth once daily. 90 tablet 1    [DISCONTINUED] aspirin 81 MG Chew aspirin 81 mg chewable tablet   Chew 1 tablet every day by oral route.      aspirin (ASPIRIN LOW DOSE)  81 MG EC tablet Take 1 tablet by mouth once daily.      cetirizine (ZYRTEC) 10 MG tablet Take 1 tablet by mouth once daily.      cyanocobalamin 1,000 mcg/mL injection Inject 1 mL into the skin every 30 days.      cyclobenzaprine (FLEXERIL) 10 MG tablet Take 1 tablet by mouth nightly as needed.      fluticasone propionate (FLONASE ALLERGY RELIEF) 50 mcg/actuation nasal spray 1 spray by Each Nostril route once daily.       No current facility-administered medications on file prior to visit.         Allergies: Hydromorphone and Tuberculin, purified protein derivative      Past Medical History:    Past Medical History:   Diagnosis Date    B12 deficiency     Bilateral carotid bruits     CAD (coronary artery disease)     sees Halina    CHF (congestive heart failure)     CVA (cerebral vascular accident)     Depression     Diabetes mellitus, type 2     GERD (gastroesophageal reflux disease)     History of colon polyps     Hyperlipidemia     Hypertension     Ischemic cardiomyopathy     Lumbar disc disease     OA (osteoarthritis)     PAD (peripheral artery disease)        Past Surgical History:    Past Surgical History:   Procedure Laterality Date    ANKLE SURGERY      APPENDECTOMY      BACK SURGERY      CHOLECYSTECTOMY      HYSTERECTOMY      TONSILLECTOMY           Social History:    Social History     Tobacco Use   Smoking Status Current Every Day Smoker   Smokeless Tobacco Never Used     Social History     Substance and Sexual Activity   Alcohol Use Never     Social History     Substance and Sexual Activity   Drug Use Never         Family History:    History reviewed. No pertinent family history.    Review of Systems:    Review of Systems   Constitutional: Positive for fatigue and fever. Negative for appetite change, chills and unexpected weight change.   HENT: Positive for nasal congestion. Negative for ear pain, postnasal drip, sinus pressure/congestion and sore throat.    Eyes: Negative for  "visual disturbance.   Respiratory: Negative for cough and shortness of breath.    Cardiovascular: Negative for chest pain and leg swelling.   Gastrointestinal: Negative for abdominal pain, change in bowel habit, constipation, nausea, vomiting and change in bowel habit.        Reports nausea and diarrhea one day last week but states this resolved after one day so she felt like it was related to something she ate   Musculoskeletal: Negative for arthralgias.   Integumentary:  Negative for rash.   Neurological: Positive for headaches. Negative for dizziness.   Psychiatric/Behavioral: The patient is not nervous/anxious.         Vitals:    /70 (BP Location: Left arm, Patient Position: Sitting, BP Method: Large (Manual))   Pulse 100   Temp 98.6 °F (37 °C) (Oral)   Resp 19   Ht 5' 6" (1.676 m)   Wt 84.5 kg (186 lb 3.2 oz)   SpO2 97%   BMI 30.05 kg/m²        Physical Exam:    Physical Exam  Constitutional:       General: She is not in acute distress.     Appearance: Normal appearance. She is obese.   HENT:      Nose: Nose normal.      Mouth/Throat:      Mouth: Mucous membranes are moist.      Pharynx: Oropharynx is clear.   Eyes:      Conjunctiva/sclera: Conjunctivae normal.   Cardiovascular:      Rate and Rhythm: Normal rate and regular rhythm.      Heart sounds: Normal heart sounds. No murmur heard.  Pulmonary:      Effort: Pulmonary effort is normal. No respiratory distress.      Breath sounds: Normal breath sounds. No wheezing, rhonchi or rales.   Abdominal:      General: Bowel sounds are normal.      Palpations: Abdomen is soft.      Tenderness: There is no abdominal tenderness.   Musculoskeletal:      Cervical back: Neck supple.   Skin:     Findings: No rash.   Neurological:      General: No focal deficit present.      Mental Status: She is alert. Mental status is at baseline.   Psychiatric:         Mood and Affect: Mood normal.     COVID-19: negative  Influenza A/B: negative  Rapid strep: " negative    Assessment/Plan:   Fever, unspecified fever cause  -     POCT SARS-COV2 (COVID) with Flu Antigen  -     POCT rapid strep A  -     Heterophile Ab Screen; Future; Expected date: 05/16/2022  -     Comprehensive Metabolic Panel; Future; Expected date: 05/16/2022  -     CBC Auto Differential; Future; Expected date: 05/16/2022         RTC in 1 month for follow up on chronic disease processes and to schedule routine screenings.  RTC sooner if needed.   Patient voiced understanding and is agreeable to plan.      Dinora Montero MD    Family Medicine

## 2022-05-19 ENCOUNTER — LAB VISIT (OUTPATIENT)
Dept: LAB | Facility: CLINIC | Age: 72
End: 2022-05-19
Payer: MEDICARE

## 2022-05-19 DIAGNOSIS — R53.83 FATIGUE, UNSPECIFIED TYPE: ICD-10-CM

## 2022-05-19 DIAGNOSIS — D64.9 ANEMIA, UNSPECIFIED TYPE: Primary | ICD-10-CM

## 2022-05-19 LAB
BASOPHILS # BLD AUTO: 0.02 K/UL (ref 0–0.2)
BASOPHILS NFR BLD AUTO: 0.3 % (ref 0–1)
DIFFERENTIAL METHOD BLD: ABNORMAL
EOSINOPHIL # BLD AUTO: 0.13 K/UL (ref 0–0.5)
EOSINOPHIL NFR BLD AUTO: 1.6 % (ref 1–4)
ERYTHROCYTE [DISTWIDTH] IN BLOOD BY AUTOMATED COUNT: 19.3 % (ref 11.5–14.5)
FERRITIN SERPL-MCNC: 4 NG/ML (ref 8–252)
FOLATE SERPL-MCNC: 10.7 NG/ML (ref 3.1–17.5)
HCT VFR BLD AUTO: 32.3 % (ref 38–47)
HGB BLD-MCNC: 9.7 G/DL (ref 12–16)
IMM GRANULOCYTES # BLD AUTO: 0.02 K/UL (ref 0–0.04)
IMM GRANULOCYTES NFR BLD: 0.3 % (ref 0–0.4)
IRON SATN MFR SERPL: 5 % (ref 14–50)
IRON SERPL-MCNC: 21 ΜG/DL (ref 50–170)
LYMPHOCYTES # BLD AUTO: 2.97 K/UL (ref 1–4.8)
LYMPHOCYTES NFR BLD AUTO: 37.7 % (ref 27–41)
MCH RBC QN AUTO: 23.8 PG (ref 27–31)
MCHC RBC AUTO-ENTMCNC: 30 G/DL (ref 32–36)
MCV RBC AUTO: 79.4 FL (ref 80–96)
MONOCYTES # BLD AUTO: 0.75 K/UL (ref 0–0.8)
MONOCYTES NFR BLD AUTO: 9.5 % (ref 2–6)
MPC BLD CALC-MCNC: 9.7 FL (ref 9.4–12.4)
NEUTROPHILS # BLD AUTO: 3.99 K/UL (ref 1.8–7.7)
NEUTROPHILS NFR BLD AUTO: 50.6 % (ref 53–65)
NRBC # BLD AUTO: 0 X10E3/UL
NRBC, AUTO (.00): 0 %
PLATELET # BLD AUTO: 268 K/UL (ref 150–400)
RBC # BLD AUTO: 4.07 M/UL (ref 4.2–5.4)
T4 FREE SERPL-MCNC: 1.12 NG/DL (ref 0.76–1.46)
TIBC SERPL-MCNC: 449 ΜG/DL (ref 250–450)
TSH SERPL DL<=0.005 MIU/L-ACNC: 2.49 UIU/ML (ref 0.36–3.74)
VIT B12 SERPL-MCNC: 129 PG/ML (ref 193–986)
WBC # BLD AUTO: 7.88 K/UL (ref 4.5–11)

## 2022-05-19 PROCEDURE — 83550 IRON BINDING TEST: CPT | Mod: ,,, | Performed by: CLINICAL MEDICAL LABORATORY

## 2022-05-19 PROCEDURE — 82607 VITAMIN B12/FOLATE, SERUM PANEL: ICD-10-PCS | Mod: GZ,,, | Performed by: CLINICAL MEDICAL LABORATORY

## 2022-05-19 PROCEDURE — 83540 ASSAY OF IRON: CPT | Mod: ,,, | Performed by: CLINICAL MEDICAL LABORATORY

## 2022-05-19 PROCEDURE — 84443 ASSAY THYROID STIM HORMONE: CPT | Mod: ,,, | Performed by: CLINICAL MEDICAL LABORATORY

## 2022-05-19 PROCEDURE — 82746 VITAMIN B12/FOLATE, SERUM PANEL: ICD-10-PCS | Mod: GZ,,, | Performed by: CLINICAL MEDICAL LABORATORY

## 2022-05-19 PROCEDURE — 84439 T4, FREE: ICD-10-PCS | Mod: ,,, | Performed by: CLINICAL MEDICAL LABORATORY

## 2022-05-19 PROCEDURE — 83540 IRON AND TIBC: ICD-10-PCS | Mod: ,,, | Performed by: CLINICAL MEDICAL LABORATORY

## 2022-05-19 PROCEDURE — 83550 IRON AND TIBC: ICD-10-PCS | Mod: ,,, | Performed by: CLINICAL MEDICAL LABORATORY

## 2022-05-19 PROCEDURE — 82746 ASSAY OF FOLIC ACID SERUM: CPT | Mod: GZ,,, | Performed by: CLINICAL MEDICAL LABORATORY

## 2022-05-19 PROCEDURE — 85025 COMPLETE CBC W/AUTO DIFF WBC: CPT | Mod: ,,, | Performed by: CLINICAL MEDICAL LABORATORY

## 2022-05-19 PROCEDURE — 82607 VITAMIN B-12: CPT | Mod: GZ,,, | Performed by: CLINICAL MEDICAL LABORATORY

## 2022-05-19 PROCEDURE — 84439 ASSAY OF FREE THYROXINE: CPT | Mod: ,,, | Performed by: CLINICAL MEDICAL LABORATORY

## 2022-05-19 PROCEDURE — 82728 ASSAY OF FERRITIN: CPT | Mod: ,,, | Performed by: CLINICAL MEDICAL LABORATORY

## 2022-05-19 PROCEDURE — 84443 TSH: ICD-10-PCS | Mod: ,,, | Performed by: CLINICAL MEDICAL LABORATORY

## 2022-05-19 PROCEDURE — 85025 CBC WITH DIFFERENTIAL: ICD-10-PCS | Mod: ,,, | Performed by: CLINICAL MEDICAL LABORATORY

## 2022-05-19 PROCEDURE — 82728 FERRITIN: ICD-10-PCS | Mod: ,,, | Performed by: CLINICAL MEDICAL LABORATORY

## 2022-05-23 DIAGNOSIS — D50.9 IRON DEFICIENCY ANEMIA, UNSPECIFIED IRON DEFICIENCY ANEMIA TYPE: Primary | ICD-10-CM

## 2022-05-23 DIAGNOSIS — Z12.11 ENCOUNTER FOR SCREENING COLONOSCOPY: ICD-10-CM

## 2022-05-23 RX ORDER — FERROUS SULFATE 325(65) MG
325 TABLET ORAL
COMMUNITY
End: 2022-05-23 | Stop reason: SDUPTHER

## 2022-05-23 RX ORDER — FERROUS SULFATE 325(65) MG
325 TABLET ORAL
Qty: 90 TABLET | Refills: 1 | Status: SHIPPED | OUTPATIENT
Start: 2022-05-23 | End: 2023-04-12 | Stop reason: SDUPTHER

## 2022-05-23 RX ORDER — CYANOCOBALAMIN 1000 UG/ML
INJECTION, SOLUTION INTRAMUSCULAR; SUBCUTANEOUS
Qty: 6 ML | Refills: 1 | Status: SHIPPED | OUTPATIENT
Start: 2022-05-23 | End: 2023-04-12 | Stop reason: SDUPTHER

## 2022-05-24 ENCOUNTER — LAB VISIT (OUTPATIENT)
Dept: LAB | Facility: CLINIC | Age: 72
End: 2022-05-24
Payer: MEDICARE

## 2022-05-24 DIAGNOSIS — D64.9 ANEMIA, UNSPECIFIED TYPE: Primary | ICD-10-CM

## 2022-05-24 DIAGNOSIS — D50.9 IRON DEFICIENCY ANEMIA, UNSPECIFIED IRON DEFICIENCY ANEMIA TYPE: ICD-10-CM

## 2022-05-24 DIAGNOSIS — D64.9 ANEMIA, UNSPECIFIED TYPE: ICD-10-CM

## 2022-05-24 LAB
FATTY ACIDS: NORMAL /HPF
FECAL LEUKOCYTES: NORMAL /HPF
NEUTRAL FATS: NORMAL /HPF
OCCULT BLOOD: NEGATIVE

## 2022-05-24 PROCEDURE — 82705 FATS/LIPIDS FECES QUAL: CPT | Mod: ,,, | Performed by: CLINICAL MEDICAL LABORATORY

## 2022-05-24 PROCEDURE — 87177 OVA AND PARASITES SMEARS: CPT | Mod: ,,, | Performed by: CLINICAL MEDICAL LABORATORY

## 2022-05-24 PROCEDURE — 87506 IADNA-DNA/RNA PROBE TQ 6-11: CPT | Mod: GZ,,, | Performed by: CLINICAL MEDICAL LABORATORY

## 2022-05-24 PROCEDURE — 87209 SMEAR COMPLEX STAIN: CPT | Mod: ,,, | Performed by: CLINICAL MEDICAL LABORATORY

## 2022-05-24 PROCEDURE — 82705 FECAL FAT, QUALITATIVE: ICD-10-PCS | Mod: ,,, | Performed by: CLINICAL MEDICAL LABORATORY

## 2022-05-24 PROCEDURE — 87177 OVA AND PARASITE EXAM: ICD-10-PCS | Mod: ,,, | Performed by: CLINICAL MEDICAL LABORATORY

## 2022-05-24 PROCEDURE — 87209 OVA AND PARASITE EXAM: ICD-10-PCS | Mod: ,,, | Performed by: CLINICAL MEDICAL LABORATORY

## 2022-05-24 PROCEDURE — 89055 LEUKOCYTE ASSESSMENT FECAL: CPT | Mod: ,,, | Performed by: CLINICAL MEDICAL LABORATORY

## 2022-05-24 PROCEDURE — 89055 FECAL LEUKOCYTES: ICD-10-PCS | Mod: ,,, | Performed by: CLINICAL MEDICAL LABORATORY

## 2022-05-24 PROCEDURE — 87506 ENTERIC PATHOGEN PANEL: ICD-10-PCS | Mod: GZ,,, | Performed by: CLINICAL MEDICAL LABORATORY

## 2022-05-25 LAB
C COLI+JEJ+UPSA DNA STL QL NAA+NON-PROBE: NEGATIVE
E COLI SXT1 STL QL IA: NEGATIVE
E COLI SXT2 STL QL IA: NEGATIVE
NOROVIRUS GI+II RNA STL QL NAA+NON-PROBE: NEGATIVE
RVA RNA STL QL NAA+NON-PROBE: NEGATIVE
S ENT+BONG DNA STL QL NAA+NON-PROBE: NEGATIVE
SHIGELLA SPECIES NAT: NEGATIVE
V CHOL+PARA+VUL DNA STL QL NAA+NON-PROBE: NEGATIVE
Y ENTEROCOL DNA STL QL NAA+NON-PROBE: NEGATIVE

## 2022-05-27 LAB
O+P STL CONC: NORMAL
TRICHROME SMEAR: NORMAL

## 2022-06-01 DIAGNOSIS — Z86.010 HISTORY OF COLON POLYPS: Primary | ICD-10-CM

## 2022-06-09 ENCOUNTER — OFFICE VISIT (OUTPATIENT)
Dept: FAMILY MEDICINE | Facility: CLINIC | Age: 72
End: 2022-06-09
Payer: MEDICARE

## 2022-06-09 VITALS
BODY MASS INDEX: 30.22 KG/M2 | WEIGHT: 181.38 LBS | RESPIRATION RATE: 18 BRPM | HEIGHT: 65 IN | HEART RATE: 111 BPM | DIASTOLIC BLOOD PRESSURE: 76 MMHG | SYSTOLIC BLOOD PRESSURE: 138 MMHG | TEMPERATURE: 97 F | OXYGEN SATURATION: 97 %

## 2022-06-09 DIAGNOSIS — Z12.11 COLON CANCER SCREENING: Primary | ICD-10-CM

## 2022-06-09 DIAGNOSIS — R53.83 OTHER FATIGUE: ICD-10-CM

## 2022-06-09 DIAGNOSIS — M62.838 MUSCLE SPASM: ICD-10-CM

## 2022-06-09 DIAGNOSIS — M54.9 DORSALGIA: Primary | ICD-10-CM

## 2022-06-09 DIAGNOSIS — Z12.31 ENCOUNTER FOR SCREENING MAMMOGRAM FOR MALIGNANT NEOPLASM OF BREAST: ICD-10-CM

## 2022-06-09 DIAGNOSIS — D50.8 OTHER IRON DEFICIENCY ANEMIA: ICD-10-CM

## 2022-06-09 LAB
ALBUMIN SERPL BCP-MCNC: 3.7 G/DL (ref 3.5–5)
ALBUMIN/GLOB SERPL: 1.1 {RATIO}
ALP SERPL-CCNC: 131 U/L (ref 55–142)
ALT SERPL W P-5'-P-CCNC: 19 U/L (ref 13–56)
ANION GAP SERPL CALCULATED.3IONS-SCNC: 12 MMOL/L (ref 7–16)
AST SERPL W P-5'-P-CCNC: 16 U/L (ref 15–37)
BASOPHILS # BLD AUTO: 0.03 K/UL (ref 0–0.2)
BASOPHILS NFR BLD AUTO: 0.3 % (ref 0–1)
BILIRUB SERPL-MCNC: 0.4 MG/DL (ref 0–1.2)
BUN SERPL-MCNC: 12 MG/DL (ref 7–18)
BUN/CREAT SERPL: 16 (ref 6–20)
CALCIUM SERPL-MCNC: 9.3 MG/DL (ref 8.5–10.1)
CHLORIDE SERPL-SCNC: 105 MMOL/L (ref 98–107)
CO2 SERPL-SCNC: 28 MMOL/L (ref 21–32)
CREAT SERPL-MCNC: 0.77 MG/DL (ref 0.55–1.02)
DIFFERENTIAL METHOD BLD: ABNORMAL
EOSINOPHIL # BLD AUTO: 0.12 K/UL (ref 0–0.5)
EOSINOPHIL NFR BLD AUTO: 1.3 % (ref 1–4)
ERYTHROCYTE [DISTWIDTH] IN BLOOD BY AUTOMATED COUNT: 21.7 % (ref 11.5–14.5)
GLOBULIN SER-MCNC: 3.5 G/DL (ref 2–4)
GLUCOSE SERPL-MCNC: 87 MG/DL (ref 74–106)
HCT VFR BLD AUTO: 39.6 % (ref 38–47)
HGB BLD-MCNC: 11.9 G/DL (ref 12–16)
IMM GRANULOCYTES # BLD AUTO: 0.02 K/UL (ref 0–0.04)
IMM GRANULOCYTES NFR BLD: 0.2 % (ref 0–0.4)
LYMPHOCYTES # BLD AUTO: 2.76 K/UL (ref 1–4.8)
LYMPHOCYTES NFR BLD AUTO: 30.4 % (ref 27–41)
MCH RBC QN AUTO: 24.7 PG (ref 27–31)
MCHC RBC AUTO-ENTMCNC: 30.1 G/DL (ref 32–36)
MCV RBC AUTO: 82.3 FL (ref 80–96)
MONOCYTES # BLD AUTO: 0.81 K/UL (ref 0–0.8)
MONOCYTES NFR BLD AUTO: 8.9 % (ref 2–6)
MPC BLD CALC-MCNC: 9.4 FL (ref 9.4–12.4)
NEUTROPHILS # BLD AUTO: 5.34 K/UL (ref 1.8–7.7)
NEUTROPHILS NFR BLD AUTO: 58.9 % (ref 53–65)
NRBC # BLD AUTO: 0 X10E3/UL
NRBC, AUTO (.00): 0 %
PLATELET # BLD AUTO: 306 K/UL (ref 150–400)
POTASSIUM SERPL-SCNC: 4.3 MMOL/L (ref 3.5–5.1)
PROT SERPL-MCNC: 7.2 G/DL (ref 6.4–8.2)
RBC # BLD AUTO: 4.81 M/UL (ref 4.2–5.4)
SODIUM SERPL-SCNC: 141 MMOL/L (ref 136–145)
WBC # BLD AUTO: 9.08 K/UL (ref 4.5–11)

## 2022-06-09 PROCEDURE — 80053 COMPREHEN METABOLIC PANEL: CPT | Mod: ,,, | Performed by: CLINICAL MEDICAL LABORATORY

## 2022-06-09 PROCEDURE — 99214 PR OFFICE/OUTPT VISIT, EST, LEVL IV, 30-39 MIN: ICD-10-PCS | Mod: ,,, | Performed by: FAMILY MEDICINE

## 2022-06-09 PROCEDURE — 80053 COMPREHENSIVE METABOLIC PANEL: ICD-10-PCS | Mod: ,,, | Performed by: CLINICAL MEDICAL LABORATORY

## 2022-06-09 PROCEDURE — 85025 CBC WITH DIFFERENTIAL: ICD-10-PCS | Mod: ,,, | Performed by: CLINICAL MEDICAL LABORATORY

## 2022-06-09 PROCEDURE — 85025 COMPLETE CBC W/AUTO DIFF WBC: CPT | Mod: ,,, | Performed by: CLINICAL MEDICAL LABORATORY

## 2022-06-09 PROCEDURE — 96372 THER/PROPH/DIAG INJ SC/IM: CPT | Mod: ,,, | Performed by: FAMILY MEDICINE

## 2022-06-09 PROCEDURE — 96372 PR INJECTION,THERAP/PROPH/DIAG2ST, IM OR SUBCUT: ICD-10-PCS | Mod: ,,, | Performed by: FAMILY MEDICINE

## 2022-06-09 PROCEDURE — 99214 OFFICE O/P EST MOD 30 MIN: CPT | Mod: ,,, | Performed by: FAMILY MEDICINE

## 2022-06-09 RX ORDER — CALCIUM CITRATE/VITAMIN D3 200MG-6.25
1-2 TABLET ORAL DAILY
COMMUNITY
Start: 2022-04-28

## 2022-06-09 RX ORDER — BETAMETHASONE SODIUM PHOSPHATE AND BETAMETHASONE ACETATE 3; 3 MG/ML; MG/ML
6 INJECTION, SUSPENSION INTRA-ARTICULAR; INTRALESIONAL; INTRAMUSCULAR; SOFT TISSUE
Status: COMPLETED | OUTPATIENT
Start: 2022-06-09 | End: 2022-06-09

## 2022-06-09 RX ORDER — CYCLOBENZAPRINE HCL 10 MG
10 TABLET ORAL 3 TIMES DAILY PRN
Qty: 30 TABLET | Refills: 2 | Status: SHIPPED | OUTPATIENT
Start: 2022-06-09 | End: 2023-04-12 | Stop reason: SDUPTHER

## 2022-06-09 RX ADMIN — BETAMETHASONE SODIUM PHOSPHATE AND BETAMETHASONE ACETATE 6 MG: 3; 3 INJECTION, SUSPENSION INTRA-ARTICULAR; INTRALESIONAL; INTRAMUSCULAR; SOFT TISSUE at 02:06

## 2022-06-09 NOTE — LETTER
June 8, 2022    [facility]  [address] [place] [address]  Phone: [phone]  Fax: [phone]       Patient: Maddie Ya   YOB: 1950  Date of Visit: 06/08/2022    To Whom It May Concern:    Jacey Ya  was at [facility] on 06/08/2022. The patient may return to work 06/13/2022 with no restrictions. If you have any questions or concerns, or if I can be of further assistance, please do not hesitate to contact me.    Sincerely,      Dr. Gonzales / Patricia Houston LPN

## 2022-06-09 NOTE — PROGRESS NOTES
Clinic Note    Patient Name: Maddie Ya  : 1950  MRN: [id number]    HPI:    Chief Complaint   Patient presents with    Back Pain     X 2 days      Ms. Maddie Ya is a 71 y.o. female who present to clinic today with CC of R sided mid back pain X 2 days. Denies any known injury. Denies associated urinary symptoms including frequency, dysuria, or hematuria. She has previously had a cholecystectomy. Reports occasional diarrhea which she attributes to metformin. Denies nausea/vomiting. Reports pain is worse with standing/walking. Reports she did take a muscle relaxer with some improvement but not resolution of symptoms.  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 Denies feeling feverish. Reports hot flashes/feeling flushed intermittently. She states she previously took HRT but has been off of these medications for many years. Reports hot flashes worsened recently. She reports it could be due to her sugar being low but she has not checked it during one of these episodes.   Patient has a h/o iron deficiency anemia. Stool for occult blood was negative. She does report fatigue but denies SOB or palpitations. Referral was previously placed to GI (Dr. Monaco) per patient request but appt is not until august. She is agreeable to referral to someone else if they can work her in sooner.   She has a h/o CAD but is scheduled to see Dr. Meade (Cardiology) in 2 weeks. Denies any associated CP.  Otherwise, without complaints.     Medications:  Current Outpatient Medications on File Prior to Visit   Medication Sig Dispense Refill    aspirin (ECOTRIN) 81 MG EC tablet Take 1 tablet by mouth once daily.      atorvastatin (LIPITOR) 80 MG tablet Take 1 tablet (80 mg total) by mouth once daily. 90 tablet 1    carvediloL (COREG) 6.25 MG tablet Take 1 tablet (6.25 mg total) by mouth 2 (two) times daily. 180 tablet 1    cetirizine (ZYRTEC) 10 MG tablet  Take 1 tablet by mouth once daily.      cilostazoL (PLETAL) 100 MG Tab Take 1 tablet (100 mg total) by mouth 2 (two) times daily. 180 tablet 1    cyanocobalamin 1,000 mcg/mL injection once every 2 weeks X 2 months and then monthly for 6 months 6 mL 1    EScitalopram oxalate (LEXAPRO) 20 MG tablet Take 1 tablet (20 mg total) by mouth once daily. 90 tablet 1    ferrous sulfate (FEOSOL) 325 mg (65 mg iron) Tab tablet Take 1 tablet (325 mg total) by mouth daily with breakfast. 90 tablet 1    fluticasone propionate (FLONASE) 50 mcg/actuation nasal spray 1 spray by Each Nostril route once daily.      furosemide (LASIX) 40 MG tablet Take 1 tablet (40 mg total) by mouth daily as needed (swelling). 90 tablet 1    metFORMIN (GLUCOPHAGE-XR) 500 MG ER 24hr tablet Take 1 tablet (500 mg total) by mouth 2 (two) times daily with meals. 180 tablet 1    omeprazole (PRILOSEC) 20 MG capsule Take 1 capsule (20 mg total) by mouth once daily. 90 capsule 1    potassium chloride (K-TAB) 20 mEq Take 1 tablet (20 mEq total) by mouth once daily. 90 tablet 1    spironolactone (ALDACTONE) 25 MG tablet Take 1 tablet (25 mg total) by mouth daily as needed (swelling). 90 tablet 1    TRUE METRIX GLUCOSE TEST STRIP Strp Inject 1-2 Sticks into the skin once daily at 6am.      valsartan (DIOVAN) 80 MG tablet Take 1 tablet (80 mg total) by mouth once daily. 90 tablet 1    [DISCONTINUED] cyclobenzaprine (FLEXERIL) 10 MG tablet Take 1 tablet by mouth nightly as needed.       No current facility-administered medications on file prior to visit.         Allergies: Hydromorphone and Tuberculin, purified protein derivative      Past Medical History:    Past Medical History:   Diagnosis Date    B12 deficiency     Bilateral carotid bruits     CAD (coronary artery disease)     sees Halina    CHF (congestive heart failure)     CVA (cerebral vascular accident)     Depression     Diabetes mellitus, type 2     GERD (gastroesophageal reflux  "disease)     History of colon polyps     Hyperlipidemia     Hypertension     Ischemic cardiomyopathy     Lumbar disc disease     OA (osteoarthritis)     PAD (peripheral artery disease)        Past Surgical History:    Past Surgical History:   Procedure Laterality Date    ANKLE SURGERY      APPENDECTOMY      BACK SURGERY      CHOLECYSTECTOMY      HYSTERECTOMY      TONSILLECTOMY           Social History:    Social History     Tobacco Use   Smoking Status Current Every Day Smoker   Smokeless Tobacco Never Used     Social History     Substance and Sexual Activity   Alcohol Use Never     Social History     Substance and Sexual Activity   Drug Use Never         Family History:    History reviewed. No pertinent family history.    Review of Systems:    Review of Systems   Constitutional: Negative for appetite change, chills, fatigue, fever and unexpected weight change.   Eyes: Negative for visual disturbance.   Respiratory: Negative for cough and shortness of breath.    Cardiovascular: Negative for chest pain and leg swelling.   Gastrointestinal: Negative for abdominal pain, blood in stool, change in bowel habit, constipation, vomiting and change in bowel habit.        Reports chronic, intermittent issues with diarrhea which she attributes to metformin  Reports occasional issues with nausea   Musculoskeletal: Positive for arthralgias and back pain.   Integumentary:  Negative for rash.   Neurological: Negative for dizziness.        Reports occasional headaches   Psychiatric/Behavioral: The patient is not nervous/anxious.         Vitals:    /76 (BP Location: Left arm, Patient Position: Sitting, BP Method: Large (Manual))   Pulse (!) 111   Temp 97.2 °F (36.2 °C) (Oral)   Resp 18   Ht 5' 5" (1.651 m)   Wt 82.3 kg (181 lb 6.4 oz)   SpO2 97%   BMI 30.19 kg/m²        Physical Exam:    Physical Exam  Constitutional:       General: She is not in acute distress.     Appearance: Normal appearance. She is " obese.   HENT:      Nose: Nose normal.      Mouth/Throat:      Mouth: Mucous membranes are moist.      Pharynx: Oropharynx is clear.   Eyes:      Conjunctiva/sclera: Conjunctivae normal.   Cardiovascular:      Rate and Rhythm: Normal rate and regular rhythm.      Heart sounds: Normal heart sounds. No murmur heard.  Pulmonary:      Effort: Pulmonary effort is normal. No respiratory distress.      Breath sounds: Normal breath sounds. No wheezing, rhonchi or rales.   Abdominal:      General: Bowel sounds are normal.      Palpations: Abdomen is soft.      Tenderness: There is no abdominal tenderness.   Musculoskeletal:         General: Tenderness present. No swelling, deformity or signs of injury.      Cervical back: Neck supple.      Comments: + muscle spasm   Skin:     Findings: No rash.   Neurological:      General: No focal deficit present.      Mental Status: She is alert. Mental status is at baseline.   Psychiatric:         Mood and Affect: Mood normal.         Assessment/Plan:   Dorsalgia  -     betamethasone acetate-betamethasone sodium phosphate injection 6 mg    Encounter for screening mammogram for malignant neoplasm of breast  -     Mammo Digital Screening Bilat; Future; Expected date: 06/09/2022    Muscle spasm  -     cyclobenzaprine (FLEXERIL) 10 MG tablet; Take 1 tablet (10 mg total) by mouth 3 (three) times daily as needed for Muscle spasms (may cause drowsiness).  Dispense: 30 tablet; Refill: 2    Other iron deficiency anemia  -     CBC Auto Differential; Future; Expected date: 06/09/2022    Other fatigue  -     CBC Auto Differential; Future; Expected date: 06/09/2022  -     Comprehensive Metabolic Panel; Future; Expected date: 06/09/2022    GI appt for iron deficiency anemia is scheduled for August 10, 2022 with Dr. Monaco.  is working to have appt moved up. Patient is agreeable to seeing another provider if Dr. Monaco is not available.      RTC as scheduled for follow up on chronic disease  processes.  RTC sooner if needed.   Patient voiced understanding and is agreeable to plan.      Dinora Montero MD    Family Medicine

## 2022-06-21 ENCOUNTER — ANESTHESIA EVENT (OUTPATIENT)
Dept: GASTROENTEROLOGY | Facility: HOSPITAL | Age: 72
End: 2022-06-21
Payer: MEDICARE

## 2022-06-21 ENCOUNTER — ANESTHESIA (OUTPATIENT)
Dept: GASTROENTEROLOGY | Facility: HOSPITAL | Age: 72
End: 2022-06-21
Payer: MEDICARE

## 2022-06-21 ENCOUNTER — HOSPITAL ENCOUNTER (OUTPATIENT)
Dept: GASTROENTEROLOGY | Facility: HOSPITAL | Age: 72
Discharge: HOME | End: 2022-06-21
Attending: INTERNAL MEDICINE
Payer: MEDICARE

## 2022-06-21 VITALS
RESPIRATION RATE: 13 BRPM | HEART RATE: 62 BPM | DIASTOLIC BLOOD PRESSURE: 58 MMHG | OXYGEN SATURATION: 99 % | SYSTOLIC BLOOD PRESSURE: 105 MMHG | TEMPERATURE: 98 F

## 2022-06-21 DIAGNOSIS — K57.30 DIVERTICULA, COLON: ICD-10-CM

## 2022-06-21 DIAGNOSIS — D50.0 IRON DEFICIENCY ANEMIA DUE TO CHRONIC BLOOD LOSS: ICD-10-CM

## 2022-06-21 DIAGNOSIS — D12.2 ADENOMATOUS POLYP OF ASCENDING COLON: ICD-10-CM

## 2022-06-21 DIAGNOSIS — Z12.11 COLON CANCER SCREENING: ICD-10-CM

## 2022-06-21 DIAGNOSIS — D12.3 ADENOMATOUS POLYP OF TRANSVERSE COLON: ICD-10-CM

## 2022-06-21 LAB
GLUCOSE SERPL-MCNC: 147 MG/DL (ref 70–105)
GLUCOSE SERPL-MCNC: 147 MG/DL (ref 70–110)

## 2022-06-21 PROCEDURE — 63600175 PHARM REV CODE 636 W HCPCS: Performed by: NURSE ANESTHETIST, CERTIFIED REGISTERED

## 2022-06-21 PROCEDURE — D9220A PRA ANESTHESIA: Mod: ,,, | Performed by: NURSE ANESTHETIST, CERTIFIED REGISTERED

## 2022-06-21 PROCEDURE — 37000008 HC ANESTHESIA 1ST 15 MINUTES

## 2022-06-21 PROCEDURE — 45388 COLONOSCOPY W/ABLATION: CPT

## 2022-06-21 PROCEDURE — 25000003 PHARM REV CODE 250: Performed by: NURSE ANESTHETIST, CERTIFIED REGISTERED

## 2022-06-21 PROCEDURE — C1889 IMPLANT/INSERT DEVICE, NOC: HCPCS

## 2022-06-21 PROCEDURE — 82962 GLUCOSE BLOOD TEST: CPT

## 2022-06-21 PROCEDURE — 37000009 HC ANESTHESIA EA ADD 15 MINS

## 2022-06-21 PROCEDURE — D9220A PRA ANESTHESIA: ICD-10-PCS | Mod: ,,, | Performed by: NURSE ANESTHETIST, CERTIFIED REGISTERED

## 2022-06-21 PROCEDURE — 45385 COLONOSCOPY W/LESION REMOVAL: CPT

## 2022-06-21 PROCEDURE — 27000284 HC CANNULA NASAL: Performed by: NURSE ANESTHETIST, CERTIFIED REGISTERED

## 2022-06-21 PROCEDURE — 27201423 OPTIME MED/SURG SUP & DEVICES STERILE SUPPLY

## 2022-06-21 RX ORDER — PROPOFOL 10 MG/ML
VIAL (ML) INTRAVENOUS
Status: DISCONTINUED | OUTPATIENT
Start: 2022-06-21 | End: 2022-06-21

## 2022-06-21 RX ORDER — LIDOCAINE HYDROCHLORIDE 20 MG/ML
INJECTION INTRAVENOUS
Status: DISCONTINUED | OUTPATIENT
Start: 2022-06-21 | End: 2022-06-21

## 2022-06-21 RX ORDER — SODIUM CHLORIDE 0.9 % (FLUSH) 0.9 %
10 SYRINGE (ML) INJECTION
Status: DISCONTINUED | OUTPATIENT
Start: 2022-06-21 | End: 2022-06-22 | Stop reason: HOSPADM

## 2022-06-21 RX ORDER — SODIUM CHLORIDE 450 MG/100ML
INJECTION, SOLUTION INTRAVENOUS CONTINUOUS PRN
Status: DISCONTINUED | OUTPATIENT
Start: 2022-06-21 | End: 2022-06-21

## 2022-06-21 RX ADMIN — SODIUM CHLORIDE: 4.5 INJECTION, SOLUTION INTRAVENOUS at 09:06

## 2022-06-21 RX ADMIN — PROPOFOL 100 MG: 10 INJECTION, EMULSION INTRAVENOUS at 09:06

## 2022-06-21 RX ADMIN — SODIUM CHLORIDE: 4.5 INJECTION, SOLUTION INTRAVENOUS at 08:06

## 2022-06-21 RX ADMIN — PROPOFOL 50 MG: 10 INJECTION, EMULSION INTRAVENOUS at 09:06

## 2022-06-21 RX ADMIN — LIDOCAINE HYDROCHLORIDE 50 MG: 20 INJECTION, SOLUTION INTRAVENOUS at 09:06

## 2022-06-21 NOTE — TRANSFER OF CARE
Anesthesia Transfer of Care Note    Patient: Maddie Ya    Procedure(s) Performed: * No procedures listed *    Patient location: GI    Anesthesia Type: general    Transport from OR: Transported from OR on room air with adequate spontaneous ventilation. Continuous ECG monitoring in transport. Continuous SpO2 monitoring in transport. Continuos invasive BP monitoring in transport    Post pain: adequate analgesia    Post assessment: no apparent anesthetic complications and tolerated procedure well    Post vital signs: stable    Level of consciousness: responds to stimulation    Nausea/Vomiting: no nausea/vomiting    Complications: none    Transfer of care protocol was followed      Last vitals:   Visit Vitals  BP (!) 105/58 (BP Location: Left arm, Patient Position: Lying)   Pulse 60   Temp 36.7 °C (98 °F) (Skin)   Resp 18   SpO2 99%   Breastfeeding No

## 2022-06-21 NOTE — H&P
[facility]  Gastroenterology  H&P    Patient Name: Maddie Ya  MRN: [id number]  Admission Date: 6/21/2022  Code Status: Full Code    Attending Provider: Wojciech Alcala MD  Primary Care Physician: Adriana Montero MD  Principal Problem:<principal problem not specified>    Subjective:     History of Present Illness: Pt has iron deficiency anemia and history of colon polyps. Her last colonoscopy was five years ago.    Past Medical History:   Diagnosis Date    B12 deficiency     Bilateral carotid bruits     CAD (coronary artery disease)     sees Halina    CHF (congestive heart failure)     CVA (cerebral vascular accident)     Depression     Diabetes mellitus, type 2     GERD (gastroesophageal reflux disease)     History of colon polyps     Hyperlipidemia     Hypertension     Ischemic cardiomyopathy     Lumbar disc disease     OA (osteoarthritis)     PAD (peripheral artery disease)        Past Surgical History:   Procedure Laterality Date    ANKLE SURGERY      APPENDECTOMY      BACK SURGERY      CARDIAC CATHETERIZATION      CHOLECYSTECTOMY      HYSTERECTOMY      TONSILLECTOMY         Review of patient's allergies indicates:   Allergen Reactions    Hydromorphone Itching    Tuberculin, purified protein derivative      Family History    None       Tobacco Use    Smoking status: Current Every Day Smoker    Smokeless tobacco: Never Used   Substance and Sexual Activity    Alcohol use: Never    Drug use: Never    Sexual activity: Not Currently     Review of Systems   Respiratory: Negative.    Cardiovascular: Negative.    Gastrointestinal: Negative.      Objective:     Vital Signs (Most Recent):  Pulse: 62 (06/21/22 0851)  Resp: 16 (06/21/22 0851)  BP: (!) 138/44 (06/21/22 0851)  SpO2: 97 % (06/21/22 0851) Vital Signs (24h Range):  Pulse:  [62] 62  Resp:  [16] 16  SpO2:  [97 %] 97 %  BP: (138)/(44) 138/44        There is no height or weight on file to calculate BMI.    No  intake or output data in the 24 hours ending 06/21/22 0907    Lines/Drains/Airways     Peripheral Intravenous Line  Duration                Peripheral IV - Single Lumen 06/21/22 0853 22 G Right Antecubital <1 day                Physical Exam  Vitals reviewed.   Constitutional:       General: She is not in acute distress.     Appearance: Normal appearance. She is well-developed. She is not ill-appearing.   HENT:      Head: Normocephalic and atraumatic.      Nose: Nose normal.   Eyes:      Pupils: Pupils are equal, round, and reactive to light.   Cardiovascular:      Rate and Rhythm: Normal rate and regular rhythm.   Pulmonary:      Effort: Pulmonary effort is normal.      Breath sounds: Normal breath sounds. No wheezing.   Abdominal:      General: Abdomen is flat. Bowel sounds are normal. There is no distension.      Palpations: Abdomen is soft.      Tenderness: There is no abdominal tenderness. There is no guarding.   Skin:     General: Skin is warm and dry.      Coloration: Skin is not jaundiced.   Neurological:      Mental Status: She is alert.   Psychiatric:         Attention and Perception: Attention normal.         Mood and Affect: Affect normal.         Speech: Speech normal.         Behavior: Behavior is cooperative.      Comments: Pt was calm while speaking.         Significant Labs:  CBC: No results for input(s): WBC, HGB, HCT, PLT in the last 48 hours.  CMP: No results for input(s): GLU, CALCIUM, ALBUMIN, PROT, NA, K, CO2, CL, BUN, CREATININE, ALKPHOS, ALT, AST, BILITOT in the last 48 hours.    Significant Imaging:  Imaging results within the past 24 hours have been reviewed.    Assessment/Plan:     There are no hospital problems to display for this patient.        Imp: iron deficiency anemia  Plan: colonoscopy    Wojciech Alcala MD  Gastroenterology  [facility]

## 2022-06-21 NOTE — DISCHARGE INSTRUCTIONS
Procedure Date  6/21/22     Impression  Overall Impression: Diverticula were noted in the sigmoid and descending colon. Six polyps were removed from the transverse colon and one from the ascending colon. Two sites were treated with APC in the transverse and one with a hemostasis clip in the transverse.     Recommendation  Await pathology results  Repeat colonoscopy in 3 years; high fiber diet. Take otc iron 325mg daily x 6 weeks, then repeat cbc, iron ferritin tibc with . Offer pt EGD re: iron deficiency anemia.

## 2022-06-21 NOTE — ANESTHESIA PREPROCEDURE EVALUATION
06/21/2022  Maddie Ya is a 71 y.o., female.      Pre-op Assessment    I have reviewed the Patient Summary Reports.    I have reviewed the NPO Status.   I have reviewed the Medications.     Review of Systems  Anesthesia Hx:  No problems with previous Anesthesia        Physical Exam  General: Well nourished    Airway:  Mallampati: II   Mouth Opening: Normal  TM Distance: Normal  Tongue: Normal  Neck ROM: Normal ROM    Dental:  Dentures       Past Medical History:   Diagnosis Date    B12 deficiency     Bilateral carotid bruits     CAD (coronary artery disease)     sees Halina    CHF (congestive heart failure)     CVA (cerebral vascular accident)     Depression     Diabetes mellitus, type 2     GERD (gastroesophageal reflux disease)     History of colon polyps     Hyperlipidemia     Hypertension     Ischemic cardiomyopathy     Lumbar disc disease     OA (osteoarthritis)     PAD (peripheral artery disease)        Past Surgical History:   Procedure Laterality Date    ANKLE SURGERY      APPENDECTOMY      BACK SURGERY      CHOLECYSTECTOMY      HYSTERECTOMY      TONSILLECTOMY         No family history on file.    Social History     Socioeconomic History    Marital status:    Tobacco Use    Smoking status: Current Every Day Smoker    Smokeless tobacco: Never Used   Substance and Sexual Activity    Alcohol use: Never    Drug use: Never    Sexual activity: Not Currently       Current Outpatient Medications   Medication Sig Dispense Refill    aspirin (ECOTRIN) 81 MG EC tablet Take 1 tablet by mouth once daily.      atorvastatin (LIPITOR) 80 MG tablet Take 1 tablet (80 mg total) by mouth once daily. 90 tablet 1    carvediloL (COREG) 6.25 MG tablet Take 1 tablet (6.25 mg total) by mouth 2 (two) times daily. 180 tablet 1    cetirizine (ZYRTEC) 10 MG tablet Take 1 tablet by mouth  once daily.      cilostazoL (PLETAL) 100 MG Tab Take 1 tablet (100 mg total) by mouth 2 (two) times daily. 180 tablet 1    cyanocobalamin 1,000 mcg/mL injection once every 2 weeks X 2 months and then monthly for 6 months 6 mL 1    cyclobenzaprine (FLEXERIL) 10 MG tablet Take 1 tablet (10 mg total) by mouth 3 (three) times daily as needed for Muscle spasms (may cause drowsiness). 30 tablet 2    EScitalopram oxalate (LEXAPRO) 20 MG tablet Take 1 tablet (20 mg total) by mouth once daily. 90 tablet 1    ferrous sulfate (FEOSOL) 325 mg (65 mg iron) Tab tablet Take 1 tablet (325 mg total) by mouth daily with breakfast. 90 tablet 1    fluticasone propionate (FLONASE) 50 mcg/actuation nasal spray 1 spray by Each Nostril route once daily.      furosemide (LASIX) 40 MG tablet Take 1 tablet (40 mg total) by mouth daily as needed (swelling). 90 tablet 1    metFORMIN (GLUCOPHAGE-XR) 500 MG ER 24hr tablet Take 1 tablet (500 mg total) by mouth 2 (two) times daily with meals. 180 tablet 1    omeprazole (PRILOSEC) 20 MG capsule Take 1 capsule (20 mg total) by mouth once daily. 90 capsule 1    potassium chloride (K-TAB) 20 mEq Take 1 tablet (20 mEq total) by mouth once daily. 90 tablet 1    spironolactone (ALDACTONE) 25 MG tablet Take 1 tablet (25 mg total) by mouth daily as needed (swelling). 90 tablet 1    TRUE METRIX GLUCOSE TEST STRIP Strp Inject 1-2 Sticks into the skin once daily at 6am.      valsartan (DIOVAN) 80 MG tablet Take 1 tablet (80 mg total) by mouth once daily. 90 tablet 1     No current facility-administered medications for this encounter.       Review of patient's allergies indicates:   Allergen Reactions    Hydromorphone Itching    Tuberculin, purified protein derivative        Anesthesia Plan  Type of Anesthesia, risks & benefits discussed:    Anesthesia Type: Gen Natural Airway  Intra-op Monitoring Plan: Standard ASA Monitors  Post Op Pain Control Plan: multimodal analgesia  Induction:   IV  Informed Consent: Informed consent signed with the Patient and all parties understand the risks and agree with anesthesia plan.  All questions answered. Patient consented to blood products? Yes  ASA Score: 4  Day of Surgery Review of History & Physical: H&P Update referred to the surgeon/provider.    Ready For Surgery From Anesthesia Perspective.     .

## 2022-06-22 LAB
ESTROGEN SERPL-MCNC: NORMAL PG/ML
INSULIN SERPL-ACNC: NORMAL U[IU]/ML
LAB AP GROSS DESCRIPTION: NORMAL
LAB AP LABORATORY NOTES: NORMAL
T3RU NFR SERPL: NORMAL %

## 2022-06-22 NOTE — ANESTHESIA POSTPROCEDURE EVALUATION
Anesthesia Post Evaluation    Patient: Maddie Ya    Procedure(s) Performed: * No procedures listed *    Final Anesthesia Type: general      Patient location during evaluation: GI PACU  Patient participation: Yes- Able to Participate  Level of consciousness: awake and alert  Post-procedure vital signs: reviewed and stable  Pain management: adequate  Airway patency: patent    PONV status at discharge: No PONV  Anesthetic complications: no      Cardiovascular status: stable  Respiratory status: unassisted, spontaneous ventilation and room air  Hydration status: euvolemic  Follow-up not needed.          Vitals Value Taken Time   /63 06/21/22 1002   Temp 98 06/22/22 0941   Pulse 60 06/21/22 1004   Resp 12 06/21/22 1004   SpO2 98 % 06/21/22 1004   Vitals shown include unvalidated device data.      Event Time   Out of Recovery 10:33:38         Pain/Cira Score: Cira Score: 8 (6/21/2022  9:40 AM)

## 2022-06-23 ENCOUNTER — TELEPHONE (OUTPATIENT)
Dept: GASTROENTEROLOGY | Facility: CLINIC | Age: 72
End: 2022-06-23
Payer: MEDICARE

## 2022-06-23 NOTE — TELEPHONE ENCOUNTER
Attempted to call patient about results and left vm for a call back.        ----- Message from Wojciech Alcala MD sent at 6/22/2022  6:52 PM CDT -----  Place pt on list for colonoscopy in 3 years; polyps were ta's.

## 2022-07-11 ENCOUNTER — OFFICE VISIT (OUTPATIENT)
Dept: FAMILY MEDICINE | Facility: CLINIC | Age: 72
End: 2022-07-11
Payer: MEDICARE

## 2022-07-11 VITALS
TEMPERATURE: 98 F | WEIGHT: 183.38 LBS | OXYGEN SATURATION: 96 % | SYSTOLIC BLOOD PRESSURE: 142 MMHG | RESPIRATION RATE: 20 BRPM | HEIGHT: 65 IN | HEART RATE: 106 BPM | BODY MASS INDEX: 30.55 KG/M2 | DIASTOLIC BLOOD PRESSURE: 80 MMHG

## 2022-07-11 DIAGNOSIS — E78.2 MIXED HYPERLIPIDEMIA: Chronic | ICD-10-CM

## 2022-07-11 DIAGNOSIS — I10 ESSENTIAL HYPERTENSION: Chronic | ICD-10-CM

## 2022-07-11 DIAGNOSIS — E11.9 TYPE 2 DIABETES MELLITUS WITHOUT COMPLICATION, WITHOUT LONG-TERM CURRENT USE OF INSULIN: Primary | Chronic | ICD-10-CM

## 2022-07-11 DIAGNOSIS — D50.8 OTHER IRON DEFICIENCY ANEMIA: Chronic | ICD-10-CM

## 2022-07-11 PROCEDURE — 99213 PR OFFICE/OUTPT VISIT, EST, LEVL III, 20-29 MIN: ICD-10-PCS | Mod: ,,, | Performed by: FAMILY MEDICINE

## 2022-07-11 PROCEDURE — 99213 OFFICE O/P EST LOW 20 MIN: CPT | Mod: ,,, | Performed by: FAMILY MEDICINE

## 2022-07-11 RX ORDER — NEOMYCIN SULFATE, POLYMYXIN B SULFATE, AND DEXAMETHASONE 3.5; 10000; 1 MG/G; [USP'U]/G; MG/G
1 OINTMENT OPHTHALMIC EVERY 6 HOURS
COMMUNITY
End: 2023-09-12 | Stop reason: ALTCHOICE

## 2022-07-11 NOTE — PROGRESS NOTES
Clinic Note    Patient Name: Maddie Ya  : 1950  MRN: [id number]    Chief Complaint   Patient presents with    Follow-up       HPI:    Ms. Maddie Ya is a 71 y.o. female who presents to clinic today with CC of follow up on chronic disease processes including DM, HTN, HLD, CAD, CHF, PVD, seasonal allergies, vitamin B12 deficiency, iron deficiency anemia, GERD, and back pain/muscle spasms.   BP is elevated today. Patient reports she was late taking her medication today. She does not routinely monitor her BP at home. Reports she has had a stressful weekend at work. Reports BP has previously been well controlled.   Patient reports blood glucose levels have been good.   She recently followed up with her Cardiology (Dr. Meade). Reports she has lab work at this visit. Reports cholesterol was much improved. Reports CMP was also good. Reports EKG was normal. Patient has a h/o iron deficiency anemia. She recently had a colonoscopy with Dr. Alcala (GI) and had polyps removed. Reports he recommended EGD as well but states no one has scheduled this to her knowledge. Reports, however, H&H was improved on recent labs performed by Dr. Meade.   Patient reports chronic issues are well controlled on current medication regimen.  Denies problems or side effects with medications.  Patient is, otherwise, without complaints.     Medications:  Medication List with Changes/Refills   Current Medications    ASPIRIN (ECOTRIN) 81 MG EC TABLET    Take 1 tablet by mouth once daily.    ATORVASTATIN (LIPITOR) 80 MG TABLET    Take 1 tablet (80 mg total) by mouth once daily.    CARVEDILOL (COREG) 6.25 MG TABLET    Take 1 tablet (6.25 mg total) by mouth 2 (two) times daily.    CETIRIZINE (ZYRTEC) 10 MG TABLET    Take 1 tablet by mouth once daily.    CILOSTAZOL (PLETAL) 100 MG TAB    Take 1 tablet (100 mg total) by mouth 2 (two) times daily.    CYANOCOBALAMIN 1,000 MCG/ML INJECTION    once every 2 weeks X 2 months and then monthly for 6  months    CYCLOBENZAPRINE (FLEXERIL) 10 MG TABLET    Take 1 tablet (10 mg total) by mouth 3 (three) times daily as needed for Muscle spasms (may cause drowsiness).    ESCITALOPRAM OXALATE (LEXAPRO) 20 MG TABLET    Take 1 tablet (20 mg total) by mouth once daily.    FERROUS SULFATE (FEOSOL) 325 MG (65 MG IRON) TAB TABLET    Take 1 tablet (325 mg total) by mouth daily with breakfast.    FLUTICASONE PROPIONATE (FLONASE) 50 MCG/ACTUATION NASAL SPRAY    1 spray by Each Nostril route once daily.    FUROSEMIDE (LASIX) 40 MG TABLET    Take 1 tablet (40 mg total) by mouth daily as needed (swelling).    METFORMIN (GLUCOPHAGE-XR) 500 MG ER 24HR TABLET    Take 1 tablet (500 mg total) by mouth 2 (two) times daily with meals.    NEOMYCIN-POLYMYXIN-DEXAMETHASONE (DEXACINE) 3.5 MG/G-10,000 UNIT/G-0.1 % OINT    Place 1 drop into both eyes every 6 (six) hours.    OMEPRAZOLE (PRILOSEC) 20 MG CAPSULE    Take 1 capsule (20 mg total) by mouth once daily.    POTASSIUM CHLORIDE (K-TAB) 20 MEQ    Take 1 tablet (20 mEq total) by mouth once daily.    SPIRONOLACTONE (ALDACTONE) 25 MG TABLET    Take 1 tablet (25 mg total) by mouth daily as needed (swelling).    TRUE METRIX GLUCOSE TEST STRIP STRP    Inject 1-2 Sticks into the skin once daily at 6am.    VALSARTAN (DIOVAN) 80 MG TABLET    Take 1 tablet (80 mg total) by mouth once daily.        Allergies: Hydromorphone and Tuberculin, purified protein derivative      Past Medical History:    Past Medical History:   Diagnosis Date    Adenomatous polyp of ascending colon 6/21/2022    Adenomatous polyp of transverse colon 6/21/2022    B12 deficiency     Bilateral carotid bruits     CAD (coronary artery disease)     sees Halina    CHF (congestive heart failure)     CVA (cerebral vascular accident)     Depression     Diabetes mellitus, type 2     Diverticula, colon 6/21/2022    GERD (gastroesophageal reflux disease)     History of colon polyps     Hyperlipidemia     Hypertension     Iron  "deficiency anemia due to chronic blood loss 6/21/2022    Ischemic cardiomyopathy     Lumbar disc disease     OA (osteoarthritis)     PAD (peripheral artery disease)        Past Surgical History:    Past Surgical History:   Procedure Laterality Date    ANKLE SURGERY      APPENDECTOMY      BACK SURGERY      CARDIAC CATHETERIZATION      CHOLECYSTECTOMY      HYSTERECTOMY      TONSILLECTOMY           Social History:    Social History     Tobacco Use   Smoking Status Current Every Day Smoker   Smokeless Tobacco Never Used     Social History     Substance and Sexual Activity   Alcohol Use Never     Social History     Substance and Sexual Activity   Drug Use Never         Family History:    History reviewed. No pertinent family history.    Review of Systems:    Review of Systems   Constitutional: Positive for fatigue. Negative for appetite change, chills, fever and unexpected weight change.   Eyes: Positive for visual disturbance.        Following with ophthalmology   Respiratory: Negative for cough and shortness of breath.    Cardiovascular: Negative for chest pain and leg swelling.   Gastrointestinal: Negative for abdominal pain, change in bowel habit, constipation, diarrhea, nausea, vomiting and change in bowel habit.   Musculoskeletal: Negative for arthralgias.   Integumentary:  Negative for rash.   Neurological: Negative for dizziness and headaches.   Psychiatric/Behavioral: The patient is not nervous/anxious.         Vitals:    Vitals:    07/11/22 1322 07/11/22 1419   BP: (!) 160/84 (!) 142/80   BP Location: Left arm Left arm   Patient Position: Sitting Sitting   BP Method: Large (Manual) Large (Manual)   Pulse: 106    Resp: 20    Temp: 97.9 °F (36.6 °C)    TempSrc: Temporal    SpO2: 96%    Weight: 83.2 kg (183 lb 6.4 oz)    Height: 5' 5" (1.651 m)        Body mass index is 30.52 kg/m².    Wt Readings from Last 3 Encounters:   07/11/22 1322 83.2 kg (183 lb 6.4 oz)   06/09/22 1311 82.3 kg (181 lb 6.4 oz) "   05/16/22 1329 84.5 kg (186 lb 3.2 oz)        Physical Exam:    Physical Exam  Constitutional:       General: She is not in acute distress.     Appearance: Normal appearance. She is obese.   HENT:      Nose: Nose normal.      Mouth/Throat:      Mouth: Mucous membranes are moist.      Pharynx: Oropharynx is clear.   Eyes:      Conjunctiva/sclera: Conjunctivae normal.   Cardiovascular:      Rate and Rhythm: Normal rate and regular rhythm.      Heart sounds: Normal heart sounds. No murmur heard.  Pulmonary:      Effort: Pulmonary effort is normal. No respiratory distress.      Breath sounds: Normal breath sounds. No wheezing, rhonchi or rales.   Abdominal:      General: Bowel sounds are normal.      Palpations: Abdomen is soft.      Tenderness: There is no abdominal tenderness.   Musculoskeletal:      Cervical back: Neck supple.   Skin:     Findings: No rash.   Neurological:      General: No focal deficit present.      Mental Status: She is alert. Mental status is at baseline.   Psychiatric:         Mood and Affect: Mood normal.           Assessment/Plan:   Type 2 diabetes mellitus without complication, without long-term current use of insulin  -     CBC Auto Differential; Future; Expected date: 08/11/2022  -     Comprehensive Metabolic Panel; Future; Expected date: 08/11/2022  -     Lipid Panel; Future; Expected date: 08/11/2022  -     Hemoglobin A1C; Future; Expected date: 08/11/2022    Essential hypertension  -     CBC Auto Differential; Future; Expected date: 08/11/2022  -     Comprehensive Metabolic Panel; Future; Expected date: 08/11/2022  -     Lipid Panel; Future; Expected date: 08/11/2022    Mixed hyperlipidemia  -     CBC Auto Differential; Future; Expected date: 08/11/2022  -     Comprehensive Metabolic Panel; Future; Expected date: 08/11/2022  -     Lipid Panel; Future; Expected date: 08/11/2022    Other iron deficiency anemia  -     CBC Auto Differential; Future; Expected date: 08/11/2022  -     Ferritin;  Future; Expected date: 08/11/2022  -     Iron and TIBC; Future; Expected date: 08/11/2022  -     Ambulatory referral/consult to Gastroenterology; Future; Expected date: 07/18/2022         Active Problem List with Overview Notes    Diagnosis Date Noted    Iron deficiency anemia due to chronic blood loss 06/21/2022    Diverticula, colon 06/21/2022    Adenomatous polyp of ascending colon 06/21/2022    Adenomatous polyp of transverse colon 06/21/2022    Dyslipidemia 05/16/2022    Mild chronic obstructive pulmonary disease 05/16/2022    Peripheral vascular disease 05/16/2022    Diabetes mellitus, type 2     GERD (gastroesophageal reflux disease)     Hyperlipidemia     Hypertension     B12 deficiency     CAD (coronary artery disease)     CHF (congestive heart failure)     History of colon polyps     OA (osteoarthritis)     Depression     Bilateral carotid bruits     CVA (cerebral vascular accident)     Ischemic cardiomyopathy     Lumbar disc disease     PAD (peripheral artery disease)     Essential hypertension 08/03/2021    Gastroesophageal reflux disease without esophagitis 08/03/2021    Type 2 diabetes mellitus without complication, without long-term current use of insulin 08/03/2021    Mixed hyperlipidemia 08/03/2021    Actinic keratoses 03/07/2019    History of degenerative disc disease 03/07/2019        Health Maintenance:  Health Maintenance   Topic Date Due    Foot Exam  Never done    Eye Exam  Never done    TETANUS VACCINE  Never done    Mammogram  04/28/2022    Hemoglobin A1c  07/05/2022    Lipid Panel  10/05/2022    High Dose Statin  07/11/2023    DEXA Scan  03/09/2025    Hepatitis C Screening  Completed       RTC in 1 month for follow up on HTN and labs as ordered today as future order. Patient to have anemia panel repeated at next visit following colonoscopy with Dr. Alcala for iron deficiency anemia currently on iron supplementation. EGD to be scheduled.   RTC sooner if  needed.   Patient voiced understanding and is agreeable to plan.      Dinora Montero MD    Family Medicine

## 2022-07-28 ENCOUNTER — DOCUMENTATION ONLY (OUTPATIENT)
Dept: RADIOLOGY | Facility: HOSPITAL | Age: 72
End: 2022-07-28

## 2022-08-03 ENCOUNTER — OFFICE VISIT (OUTPATIENT)
Dept: FAMILY MEDICINE | Facility: CLINIC | Age: 72
End: 2022-08-03
Payer: MEDICARE

## 2022-08-03 VITALS
HEART RATE: 112 BPM | SYSTOLIC BLOOD PRESSURE: 120 MMHG | RESPIRATION RATE: 18 BRPM | OXYGEN SATURATION: 97 % | BODY MASS INDEX: 29.29 KG/M2 | DIASTOLIC BLOOD PRESSURE: 74 MMHG | HEIGHT: 65 IN | WEIGHT: 175.81 LBS | TEMPERATURE: 97 F

## 2022-08-03 DIAGNOSIS — R53.83 FATIGUE, UNSPECIFIED TYPE: ICD-10-CM

## 2022-08-03 DIAGNOSIS — I10 ESSENTIAL HYPERTENSION: ICD-10-CM

## 2022-08-03 DIAGNOSIS — R19.7 DIARRHEA, UNSPECIFIED TYPE: ICD-10-CM

## 2022-08-03 DIAGNOSIS — A08.4 VIRAL GASTROENTERITIS: Primary | ICD-10-CM

## 2022-08-03 DIAGNOSIS — D50.8 OTHER IRON DEFICIENCY ANEMIA: ICD-10-CM

## 2022-08-03 DIAGNOSIS — R52 GENERALIZED BODY ACHES: ICD-10-CM

## 2022-08-03 DIAGNOSIS — E78.2 MIXED HYPERLIPIDEMIA: ICD-10-CM

## 2022-08-03 DIAGNOSIS — E11.9 TYPE 2 DIABETES MELLITUS WITHOUT COMPLICATION, WITHOUT LONG-TERM CURRENT USE OF INSULIN: ICD-10-CM

## 2022-08-03 DIAGNOSIS — Z20.822 EXPOSURE TO COVID-19 VIRUS: ICD-10-CM

## 2022-08-03 LAB
ALBUMIN SERPL BCP-MCNC: 3.7 G/DL (ref 3.5–5)
ALBUMIN/GLOB SERPL: 0.9 {RATIO}
ALP SERPL-CCNC: 120 U/L (ref 55–142)
ALT SERPL W P-5'-P-CCNC: 33 U/L (ref 13–56)
ANION GAP SERPL CALCULATED.3IONS-SCNC: 11 MMOL/L (ref 7–16)
AST SERPL W P-5'-P-CCNC: 28 U/L (ref 15–37)
BASOPHILS # BLD AUTO: 0.04 K/UL (ref 0–0.2)
BASOPHILS NFR BLD AUTO: 0.4 % (ref 0–1)
BILIRUB SERPL-MCNC: 0.4 MG/DL (ref 0–1.2)
BUN SERPL-MCNC: 12 MG/DL (ref 7–18)
BUN/CREAT SERPL: 17 (ref 6–20)
CALCIUM SERPL-MCNC: 9.2 MG/DL (ref 8.5–10.1)
CHLORIDE SERPL-SCNC: 105 MMOL/L (ref 98–107)
CHOLEST SERPL-MCNC: 120 MG/DL (ref 0–200)
CHOLEST/HDLC SERPL: 1.9 {RATIO}
CO2 SERPL-SCNC: 28 MMOL/L (ref 21–32)
CREAT SERPL-MCNC: 0.7 MG/DL (ref 0.55–1.02)
CTP QC/QA: YES
DIFFERENTIAL METHOD BLD: ABNORMAL
EOSINOPHIL # BLD AUTO: 0.17 K/UL (ref 0–0.5)
EOSINOPHIL NFR BLD AUTO: 1.7 % (ref 1–4)
ERYTHROCYTE [DISTWIDTH] IN BLOOD BY AUTOMATED COUNT: 20.4 % (ref 11.5–14.5)
EST. AVERAGE GLUCOSE BLD GHB EST-MCNC: 134 MG/DL
FERRITIN SERPL-MCNC: 18 NG/ML (ref 8–252)
FLUAV AG NPH QL: NEGATIVE
FLUBV AG NPH QL: NEGATIVE
GLOBULIN SER-MCNC: 3.9 G/DL (ref 2–4)
GLUCOSE SERPL-MCNC: 124 MG/DL (ref 74–106)
HBA1C MFR BLD HPLC: 6.6 % (ref 4.5–6.6)
HCT VFR BLD AUTO: 44.4 % (ref 38–47)
HDLC SERPL-MCNC: 64 MG/DL (ref 40–60)
HGB BLD-MCNC: 14 G/DL (ref 12–16)
IMM GRANULOCYTES # BLD AUTO: 0.02 K/UL (ref 0–0.04)
IMM GRANULOCYTES NFR BLD: 0.2 % (ref 0–0.4)
IRON SATN MFR SERPL: 14 % (ref 14–50)
IRON SERPL-MCNC: 60 ΜG/DL (ref 50–170)
LDLC SERPL CALC-MCNC: 26 MG/DL
LDLC/HDLC SERPL: 0.4 {RATIO}
LYMPHOCYTES # BLD AUTO: 3.09 K/UL (ref 1–4.8)
LYMPHOCYTES NFR BLD AUTO: 31.1 % (ref 27–41)
MCH RBC QN AUTO: 27.3 PG (ref 27–31)
MCHC RBC AUTO-ENTMCNC: 31.5 G/DL (ref 32–36)
MCV RBC AUTO: 86.7 FL (ref 80–96)
MONOCYTES # BLD AUTO: 0.85 K/UL (ref 0–0.8)
MONOCYTES NFR BLD AUTO: 8.6 % (ref 2–6)
MPC BLD CALC-MCNC: 9.7 FL (ref 9.4–12.4)
NEUTROPHILS # BLD AUTO: 5.75 K/UL (ref 1.8–7.7)
NEUTROPHILS NFR BLD AUTO: 58 % (ref 53–65)
NONHDLC SERPL-MCNC: 56 MG/DL
NRBC # BLD AUTO: 0 X10E3/UL
NRBC, AUTO (.00): 0 %
PLATELET # BLD AUTO: 259 K/UL (ref 150–400)
POTASSIUM SERPL-SCNC: 4 MMOL/L (ref 3.5–5.1)
PROT SERPL-MCNC: 7.6 G/DL (ref 6.4–8.2)
RBC # BLD AUTO: 5.12 M/UL (ref 4.2–5.4)
SARS-COV-2 AG RESP QL IA.RAPID: NEGATIVE
SODIUM SERPL-SCNC: 140 MMOL/L (ref 136–145)
TIBC SERPL-MCNC: 414 ΜG/DL (ref 250–450)
TRIGL SERPL-MCNC: 151 MG/DL (ref 35–150)
VLDLC SERPL-MCNC: 30 MG/DL
WBC # BLD AUTO: 9.92 K/UL (ref 4.5–11)

## 2022-08-03 PROCEDURE — 87428 SARSCOV & INF VIR A&B AG IA: CPT | Mod: RHCUB | Performed by: FAMILY MEDICINE

## 2022-08-03 PROCEDURE — 83550 IRON BINDING TEST: CPT | Mod: ,,, | Performed by: CLINICAL MEDICAL LABORATORY

## 2022-08-03 PROCEDURE — 80061 LIPID PANEL: ICD-10-PCS | Mod: ,,, | Performed by: CLINICAL MEDICAL LABORATORY

## 2022-08-03 PROCEDURE — 99213 OFFICE O/P EST LOW 20 MIN: CPT | Mod: ,,, | Performed by: FAMILY MEDICINE

## 2022-08-03 PROCEDURE — 85025 COMPLETE CBC W/AUTO DIFF WBC: CPT | Mod: ,,, | Performed by: CLINICAL MEDICAL LABORATORY

## 2022-08-03 PROCEDURE — 80053 COMPREHEN METABOLIC PANEL: CPT | Mod: ,,, | Performed by: CLINICAL MEDICAL LABORATORY

## 2022-08-03 PROCEDURE — 83036 HEMOGLOBIN GLYCOSYLATED A1C: CPT | Mod: ,,, | Performed by: CLINICAL MEDICAL LABORATORY

## 2022-08-03 PROCEDURE — 80053 COMPREHENSIVE METABOLIC PANEL: ICD-10-PCS | Mod: ,,, | Performed by: CLINICAL MEDICAL LABORATORY

## 2022-08-03 PROCEDURE — 85025 CBC WITH DIFFERENTIAL: ICD-10-PCS | Mod: ,,, | Performed by: CLINICAL MEDICAL LABORATORY

## 2022-08-03 PROCEDURE — 83036 HEMOGLOBIN A1C: ICD-10-PCS | Mod: ,,, | Performed by: CLINICAL MEDICAL LABORATORY

## 2022-08-03 PROCEDURE — 82728 ASSAY OF FERRITIN: CPT | Mod: ,,, | Performed by: CLINICAL MEDICAL LABORATORY

## 2022-08-03 PROCEDURE — 99213 PR OFFICE/OUTPT VISIT, EST, LEVL III, 20-29 MIN: ICD-10-PCS | Mod: ,,, | Performed by: FAMILY MEDICINE

## 2022-08-03 PROCEDURE — 80061 LIPID PANEL: CPT | Mod: ,,, | Performed by: CLINICAL MEDICAL LABORATORY

## 2022-08-03 PROCEDURE — 83550 IRON AND TIBC: ICD-10-PCS | Mod: ,,, | Performed by: CLINICAL MEDICAL LABORATORY

## 2022-08-03 PROCEDURE — 83540 ASSAY OF IRON: CPT | Mod: ,,, | Performed by: CLINICAL MEDICAL LABORATORY

## 2022-08-03 PROCEDURE — 82728 FERRITIN: ICD-10-PCS | Mod: ,,, | Performed by: CLINICAL MEDICAL LABORATORY

## 2022-08-03 PROCEDURE — 83540 IRON AND TIBC: ICD-10-PCS | Mod: ,,, | Performed by: CLINICAL MEDICAL LABORATORY

## 2022-08-03 RX ORDER — PROMETHAZINE HYDROCHLORIDE 25 MG/1
25 TABLET ORAL EVERY 6 HOURS PRN
Qty: 30 TABLET | Refills: 0 | Status: SHIPPED | OUTPATIENT
Start: 2022-08-03 | End: 2023-04-12 | Stop reason: SDUPTHER

## 2022-08-03 NOTE — PROGRESS NOTES
Clinic Note    Patient Name: Maddie Ya  : 1950  MRN: [id number]    Chief Complaint   Patient presents with    Nausea    Diarrhea    Fatigue     All x 2 days     Generalized Body Aches       HPI:    Ms. Maddie Ya is a 71 y.o. female who presents to clinic today with CC of nausea/diarrhea and body aches X 2 days.   Patient reports approximately 8-10 loose stools in the past 24 hours. Reports nausea but denies vomiting. Reports generalized weakness. Reports chills. Reports subjective fever at home.  Patient denies abdominal pain. Denies blood in stool. Reports stool has been dark but states that this is chronic due to iron supplementation. Denies any changes in color of stool.  Patient is, otherwise, without complaints.     Medications:  Medication List with Changes/Refills   New Medications    PROMETHAZINE (PHENERGAN) 25 MG TABLET    Take 1 tablet (25 mg total) by mouth every 6 (six) hours as needed for Nausea (may cause drowsiness).   Current Medications    ASPIRIN (ECOTRIN) 81 MG EC TABLET    Take 1 tablet by mouth once daily.    ATORVASTATIN (LIPITOR) 80 MG TABLET    Take 1 tablet (80 mg total) by mouth once daily.    CARVEDILOL (COREG) 6.25 MG TABLET    Take 1 tablet (6.25 mg total) by mouth 2 (two) times daily.    CETIRIZINE (ZYRTEC) 10 MG TABLET    Take 1 tablet by mouth once daily.    CILOSTAZOL (PLETAL) 100 MG TAB    Take 1 tablet (100 mg total) by mouth 2 (two) times daily.    CYANOCOBALAMIN 1,000 MCG/ML INJECTION    once every 2 weeks X 2 months and then monthly for 6 months    CYCLOBENZAPRINE (FLEXERIL) 10 MG TABLET    Take 1 tablet (10 mg total) by mouth 3 (three) times daily as needed for Muscle spasms (may cause drowsiness).    ESCITALOPRAM OXALATE (LEXAPRO) 20 MG TABLET    Take 1 tablet (20 mg total) by mouth once daily.    FERROUS SULFATE (FEOSOL) 325 MG (65 MG IRON) TAB TABLET    Take 1 tablet (325 mg total) by mouth daily with breakfast.    FLUTICASONE PROPIONATE (FLONASE) 50  MCG/ACTUATION NASAL SPRAY    1 spray by Each Nostril route once daily.    FUROSEMIDE (LASIX) 40 MG TABLET    Take 1 tablet (40 mg total) by mouth daily as needed (swelling).    METFORMIN (GLUCOPHAGE-XR) 500 MG ER 24HR TABLET    Take 1 tablet (500 mg total) by mouth 2 (two) times daily with meals.    NEOMYCIN-POLYMYXIN-DEXAMETHASONE (DEXACINE) 3.5 MG/G-10,000 UNIT/G-0.1 % OINT    Place 1 drop into both eyes every 6 (six) hours.    OMEPRAZOLE (PRILOSEC) 20 MG CAPSULE    Take 1 capsule (20 mg total) by mouth once daily.    POTASSIUM CHLORIDE (K-TAB) 20 MEQ    Take 1 tablet (20 mEq total) by mouth once daily.    SPIRONOLACTONE (ALDACTONE) 25 MG TABLET    Take 1 tablet (25 mg total) by mouth daily as needed (swelling).    TRUE METRIX GLUCOSE TEST STRIP STRP    Inject 1-2 Sticks into the skin once daily at 6am.    VALSARTAN (DIOVAN) 80 MG TABLET    Take 1 tablet (80 mg total) by mouth once daily.        Allergies: Hydromorphone and Tuberculin, purified protein derivative      Past Medical History:    Past Medical History:   Diagnosis Date    Adenomatous polyp of ascending colon 6/21/2022    Adenomatous polyp of transverse colon 6/21/2022    B12 deficiency     Bilateral carotid bruits     CAD (coronary artery disease)     sees Halina    CHF (congestive heart failure)     CVA (cerebral vascular accident)     Depression     Diabetes mellitus, type 2     Diverticula, colon 6/21/2022    GERD (gastroesophageal reflux disease)     History of colon polyps     Hyperlipidemia     Hypertension     Iron deficiency anemia due to chronic blood loss 6/21/2022    Ischemic cardiomyopathy     Lumbar disc disease     OA (osteoarthritis)     PAD (peripheral artery disease)        Past Surgical History:    Past Surgical History:   Procedure Laterality Date    ANKLE SURGERY      APPENDECTOMY      BACK SURGERY      CARDIAC CATHETERIZATION      CHOLECYSTECTOMY      HYSTERECTOMY      TONSILLECTOMY           Social  "History:    Social History     Tobacco Use   Smoking Status Current Every Day Smoker    Packs/day: 0.50    Years: 46.00    Pack years: 23.00    Types: Cigarettes   Smokeless Tobacco Never Used     Social History     Substance and Sexual Activity   Alcohol Use Never     Social History     Substance and Sexual Activity   Drug Use Never         Family History:    History reviewed. No pertinent family history.    Review of Systems:    Review of Systems   Constitutional: Positive for chills, fatigue and fever. Negative for appetite change and unexpected weight change.   Eyes: Positive for visual disturbance.        Reports she has been referred to cornea specialist   Respiratory: Negative for cough and shortness of breath.    Cardiovascular: Negative for chest pain and leg swelling.   Gastrointestinal: Positive for diarrhea and nausea. Negative for abdominal pain, change in bowel habit, constipation, vomiting and change in bowel habit.   Musculoskeletal: Positive for arthralgias.        + body aches   Integumentary:  Negative for rash.   Neurological: Positive for headaches. Negative for dizziness.   Psychiatric/Behavioral: The patient is not nervous/anxious.         Vitals:    Vitals:    08/03/22 1313   BP: 120/74   BP Location: Left arm   Patient Position: Sitting   BP Method: Large (Manual)   Pulse: (!) 112   Resp: 18   Temp: 97.2 °F (36.2 °C)   TempSrc: Oral   SpO2: 97%   Weight: 79.7 kg (175 lb 12.8 oz)   Height: 5' 5" (1.651 m)       Body mass index is 29.25 kg/m².    Wt Readings from Last 3 Encounters:   08/03/22 1313 79.7 kg (175 lb 12.8 oz)   07/11/22 1322 83.2 kg (183 lb 6.4 oz)   06/09/22 1311 82.3 kg (181 lb 6.4 oz)        Physical Exam:    Physical Exam  Constitutional:       General: She is not in acute distress.     Appearance: Normal appearance.   HENT:      Nose: Nose normal.      Mouth/Throat:      Mouth: Mucous membranes are moist.      Pharynx: Oropharynx is clear.   Eyes:      Conjunctiva/sclera: " Conjunctivae normal.   Cardiovascular:      Rate and Rhythm: Normal rate and regular rhythm.      Heart sounds: Normal heart sounds. No murmur heard.  Pulmonary:      Effort: Pulmonary effort is normal. No respiratory distress.      Breath sounds: Normal breath sounds. No wheezing, rhonchi or rales.   Abdominal:      General: Bowel sounds are normal.      Palpations: Abdomen is soft.      Tenderness: There is no abdominal tenderness.   Musculoskeletal:      Cervical back: Neck supple.   Skin:     Findings: No rash.   Neurological:      General: No focal deficit present.      Mental Status: She is alert. Mental status is at baseline.   Psychiatric:         Mood and Affect: Mood normal.         Results:  COVID-19: negative  Influenza A/B: negative      Assessment/Plan:   Viral gastroenteritis  -     promethazine (PHENERGAN) 25 MG tablet; Take 1 tablet (25 mg total) by mouth every 6 (six) hours as needed for Nausea (may cause drowsiness).  Dispense: 30 tablet; Refill: 0  - Patient provided with stool specimen cup for patient to return sample if diarrhea does not resolve    Generalized body aches  -     POCT SARS-COV2 (COVID) with Flu Antigen    Diarrhea, unspecified type  -     POCT SARS-COV2 (COVID) with Flu Antigen    Fatigue, unspecified type  -     POCT SARS-COV2 (COVID) with Flu Antigen    Exposure to COVID-19 virus  -     POCT SARS-COV2 (COVID) with Flu Antigen    Type 2 diabetes mellitus without complication, without long-term current use of insulin  -     CBC Auto Differential  -     Comprehensive Metabolic Panel  -     Lipid Panel  -     Hemoglobin A1C    Essential hypertension  -     CBC Auto Differential  -     Comprehensive Metabolic Panel  -     Lipid Panel    Mixed hyperlipidemia  -     CBC Auto Differential  -     Comprehensive Metabolic Panel  -     Lipid Panel    Other iron deficiency anemia  -     CBC Auto Differential  -     Ferritin  -     Iron and TIBC         Active Problem List with Overview  Notes    Diagnosis Date Noted    Iron deficiency anemia due to chronic blood loss 06/21/2022    Diverticula, colon 06/21/2022    Adenomatous polyp of ascending colon 06/21/2022    Adenomatous polyp of transverse colon 06/21/2022    Dyslipidemia 05/16/2022    Mild chronic obstructive pulmonary disease 05/16/2022    Peripheral vascular disease 05/16/2022    Diabetes mellitus, type 2     GERD (gastroesophageal reflux disease)     Hyperlipidemia     Hypertension     B12 deficiency     CAD (coronary artery disease)     CHF (congestive heart failure)     History of colon polyps     OA (osteoarthritis)     Depression     Bilateral carotid bruits     CVA (cerebral vascular accident)     Ischemic cardiomyopathy     Lumbar disc disease     PAD (peripheral artery disease)     Essential hypertension 08/03/2021    Gastroesophageal reflux disease without esophagitis 08/03/2021    Type 2 diabetes mellitus without complication, without long-term current use of insulin 08/03/2021    Mixed hyperlipidemia 08/03/2021    Actinic keratoses 03/07/2019    History of degenerative disc disease 03/07/2019          RTC prn if symptoms worsen or fail to resolve.  Patient voiced understanding and is agreeable to plan.      Dinora Montero MD    Family Medicine

## 2022-10-09 DIAGNOSIS — Z71.89 COMPLEX CARE COORDINATION: ICD-10-CM

## 2023-01-30 NOTE — PROGRESS NOTES
[facility]      PATIENT NAME: Maddie Ya   : 1950    AGE: 72 y.o. DATE: 2023   MRN: [id number]        Reason for Visit / Chief Complaint: Medicare AWV ( Subsequent Medicare AWV )        Maddie Ya presents for a Subsequent Medicare AWV today.  Patient has a PMH significant for Type II DM, HTN, HLD, h/o CVA, CAD, CHF, PVD, ischemic cardiomyopathy, iron deficiency anemia, vitamin B12 deficiency, GERD, depression, degenerative disc disease, and OA.  Patient reports chronic issues are well controlled on current medication regimen.  Denies problems or side effects with medications.  Patient is, otherwise, without complaints.      The following components were reviewed and updated:    Medical/Social/Family History:  Past Medical History:   Diagnosis Date    Adenomatous polyp of ascending colon 2022    Adenomatous polyp of transverse colon 2022    B12 deficiency     Bilateral carotid bruits     CAD (coronary artery disease)     sees Halina    CHF (congestive heart failure)     CVA (cerebral vascular accident)     Depression     Diabetes mellitus, type 2     Diverticula, colon 2022    GERD (gastroesophageal reflux disease)     Heart attack     History of colon polyps     Hyperlipidemia     Hypertension     Iron deficiency anemia due to chronic blood loss 2022    Ischemic cardiomyopathy     Lumbar disc disease     OA (osteoarthritis)     PAD (peripheral artery disease)         Family History   Problem Relation Age of Onset    Osteoarthritis Mother     Diabetes Mother     Heart failure Mother     Hyperlipidemia Mother     Hypertension Mother     Cancer Father     Diabetes Father     Hypertension Father     Heart attack Father     Lung cancer Father     Cancer Brother     Multiple myeloma Brother         Past Surgical History:   Procedure Laterality Date    ANKLE SURGERY      APPENDECTOMY      BACK SURGERY      CARDIAC CATHETERIZATION       CHOLECYSTECTOMY      COLONOSCOPY  06/23/2022    hx colon polyps    CORNEAL TRANSPLANT Bilateral     CORONARY ANGIOPLASTY WITH STENT PLACEMENT      HYSTERECTOMY      TONSILLECTOMY      TOTAL ABDOMINAL HYSTERECTOMY W/ BILATERAL SALPINGOOPHORECTOMY         Social History     Tobacco Use   Smoking Status Every Day    Packs/day: 0.50    Years: 50.00    Pack years: 25.00    Types: Cigarettes    Start date: 1973   Smokeless Tobacco Never   Tobacco Comments    Off and on smoker approximately 50 years        Social History     Substance and Sexual Activity   Alcohol Use Never         Allergies and Current Medications     Review of patient's allergies indicates:   Allergen Reactions    Hydromorphone Itching    Tuberculin, purified protein derivative        Current Outpatient Medications:     aspirin (ECOTRIN) 81 MG EC tablet, Take 1 tablet by mouth once daily., Disp: , Rfl:     aspirin (ECOTRIN) 81 MG EC tablet, 1 tablet., Disp: , Rfl:     atorvastatin (LIPITOR) 80 MG tablet, Take 1 tablet (80 mg total) by mouth once daily., Disp: 90 tablet, Rfl: 1    carvediloL (COREG) 6.25 MG tablet, Take 1 tablet (6.25 mg total) by mouth 2 (two) times daily., Disp: 180 tablet, Rfl: 1    cetirizine (ZYRTEC) 10 MG tablet, Take 1 tablet by mouth once daily., Disp: , Rfl:     cilostazoL (PLETAL) 100 MG Tab, Take 1 tablet (100 mg total) by mouth 2 (two) times daily., Disp: 180 tablet, Rfl: 1    cyanocobalamin 1,000 mcg/mL injection, once every 2 weeks X 2 months and then monthly for 6 months, Disp: 6 mL, Rfl: 1    cyanocobalamin 1,000 mcg/mL injection, 1 mL., Disp: , Rfl:     cyclobenzaprine (FLEXERIL) 10 MG tablet, Take 1 tablet (10 mg total) by mouth 3 (three) times daily as needed for Muscle spasms (may cause drowsiness)., Disp: 30 tablet, Rfl: 2    cyclobenzaprine (FLEXERIL) 10 MG tablet, 1 tablet nightly as needed., Disp: , Rfl:     EScitalopram oxalate (LEXAPRO) 20 MG tablet, Take 1 tablet (20 mg total) by mouth once daily., Disp: 90  tablet, Rfl: 1    furosemide (LASIX) 40 MG tablet, Take 1 tablet (40 mg total) by mouth daily as needed (swelling)., Disp: 90 tablet, Rfl: 1    furosemide (LASIX) 40 MG tablet, 1 tablet., Disp: , Rfl:     metFORMIN (GLUCOPHAGE-XR) 500 MG ER 24hr tablet, Take 1 tablet (500 mg total) by mouth 2 (two) times daily with meals., Disp: 180 tablet, Rfl: 1    omeprazole (PRILOSEC) 20 MG capsule, Take 1 capsule (20 mg total) by mouth once daily., Disp: 90 capsule, Rfl: 1    potassium chloride (K-TAB) 20 mEq, Take 1 tablet (20 mEq total) by mouth once daily., Disp: 90 tablet, Rfl: 1    prednisoLONE acetate (PRED FORTE) 1 % DrpS, Place 1 drop into both eyes 4 (four) times daily., Disp: , Rfl:     promethazine (PHENERGAN) 25 MG tablet, Take 1 tablet (25 mg total) by mouth every 6 (six) hours as needed for Nausea (may cause drowsiness)., Disp: 30 tablet, Rfl: 0    spironolactone (ALDACTONE) 25 MG tablet, Take 1 tablet (25 mg total) by mouth daily as needed (swelling)., Disp: 90 tablet, Rfl: 1    spironolactone (ALDACTONE) 25 MG tablet, 1 tablet., Disp: , Rfl:     TRUE METRIX GLUCOSE TEST STRIP Strp, Inject 1-2 Sticks into the skin once daily at 6am., Disp: , Rfl:     valsartan (DIOVAN) 80 MG tablet, Take 1 tablet (80 mg total) by mouth once daily., Disp: 90 tablet, Rfl: 1    vit C/E/Zn/coppr/lutein/zeaxan (PRESERVISION AREDS-2 ORAL), Take by mouth., Disp: , Rfl:     ferrous sulfate (FEOSOL) 325 mg (65 mg iron) Tab tablet, Take 1 tablet (325 mg total) by mouth daily with breakfast. (Patient not taking: Reported on 1/31/2023), Disp: 90 tablet, Rfl: 1    fluticasone propionate (FLONASE) 50 mcg/actuation nasal spray, 1 spray by Each Nostril route once daily., Disp: , Rfl:     neomycin-polymyxin-dexamethasone (DEXACINE) 3.5 mg/g-10,000 unit/g-0.1 % Oint, Place 1 drop into both eyes every 6 (six) hours., Disp: , Rfl:       Health Risk Assessment   Fall Risk:  yes   Obesity: BMI Body mass index is 28.08 kg/m².   Advance Directive:  Does  not have an advanced directive. Verbal information and written information provided on today's AVS.    Depression: PHQ9- 2   HTN: DASH diet, exercise, weight management, med compliance, home BP monitoring, and follow-up discussed.   T2DM:  diabetic diet, glucose monitoring, activity level, weight management, med compliance, and follow-up discussed.  STI: not at risk    Statin Use: yes      Health Maintenance   Last eye exam: 1/2023 dr owen ren ms request sent copy report    Last CV screen with lipids: 08/03/2022   Diabetes screening with fasting glucose or A1c: 08/03/2022   Colonoscopy: 06/23/2022 repeat 3 years    Flu Vaccine: 10/2022 at work [facility]    Pneumonia vaccines: 13 - 01/29/2019, 23 - 02/04/2020   COVID vaccine: 01/18/2021, 02/05/2021, 11/06/2021   Hep B vaccine: na   DEXA: 03/09/2020   Last pap/pelvic: declines    Last Mammogram: 07/07/2022   AAA screening: declines    HIV Screeing: declines   Hepatitis C Screen: 04/05/2022  Low Dose CT Scan: 05/10/2022    Health Maintenance Topics with due status: Not Due       Topic Last Completion Date    DEXA Scan 03/09/2020    Diabetes Urine Screening 04/05/2022    LDCT Lung Screen 05/10/2022    Colorectal Cancer Screening 06/21/2022    Lipid Panel 08/03/2022    High Dose Statin 01/31/2023    Aspirin/Antiplatelet Therapy 01/31/2023    Foot Exam 01/31/2023     Health Maintenance Due   Topic Date Due    Eye Exam  Never done    Mammogram  04/28/2022    Hemoglobin A1c  11/03/2022         Lab results available in Epic or see dates from [facility] above:   Lab Results   Component Value Date    CHOL 120 08/03/2022    CHOL 126 10/05/2021     Lab Results   Component Value Date    HDL 64 (H) 08/03/2022    HDL 72 (H) 10/05/2021     Lab Results   Component Value Date    LDLCALC 26 08/03/2022    LDLCALC 26 10/05/2021     Lab Results   Component Value Date    TRIG 151 (H) 08/03/2022    TRIG 139 10/05/2021     Lab Results   Component Value Date    CHOLHDL 1.9  08/03/2022    CHOLHDL 1.8 10/05/2021       Lab Results   Component Value Date    HGBA1C 6.6 08/03/2022       Sodium   Date Value Ref Range Status   08/03/2022 140 136 - 145 mmol/L Final     Potassium   Date Value Ref Range Status   08/03/2022 4.0 3.5 - 5.1 mmol/L Final     Chloride   Date Value Ref Range Status   08/03/2022 105 98 - 107 mmol/L Final     CO2   Date Value Ref Range Status   08/03/2022 28 21 - 32 mmol/L Final     Glucose   Date Value Ref Range Status   08/03/2022 124 (H) 74 - 106 mg/dL Final     BUN   Date Value Ref Range Status   08/03/2022 12 7 - 18 mg/dL Final     Creatinine   Date Value Ref Range Status   08/03/2022 0.70 0.55 - 1.02 mg/dL Final     Calcium   Date Value Ref Range Status   08/03/2022 9.2 8.5 - 10.1 mg/dL Final     Total Protein   Date Value Ref Range Status   08/03/2022 7.6 6.4 - 8.2 g/dL Final     Albumin   Date Value Ref Range Status   08/03/2022 3.7 3.5 - 5.0 g/dL Final     Bilirubin, Total   Date Value Ref Range Status   08/03/2022 0.4 0.0 - 1.2 mg/dL Final     Alk Phos   Date Value Ref Range Status   08/03/2022 120 55 - 142 U/L Final     AST   Date Value Ref Range Status   08/03/2022 28 15 - 37 U/L Final     ALT   Date Value Ref Range Status   08/03/2022 33 13 - 56 U/L Final     Anion Gap   Date Value Ref Range Status   08/03/2022 11 7 - 16 mmol/L Final     eGFR   Date Value Ref Range Status   08/03/2022 88 >=60 mL/min/1.73m² Final             Incontinence  Bowel: yes occasionally uses pull ups  Bladder: yes occasionally uses pull ups      Care Team  Dr. Shama Gonzales pcp                 Dr. Dusty Buenrostro ophthamology                  Dr. Meaed cardiology     **See Completed Assessments for Annual Wellness visit within the encounter summary    The following assessments were completed & reviewed:  Depression Screening  Cognitive function Screening  Timed Get Up Test  Whisper Test  Vision Screen  Health Risk Assessment  Checklist of ADLs and IADLs        Objective  Vitals:    01/31/23  "0810 01/31/23 0919   BP: (!) 140/70 134/70   Pulse: 81    Resp: 14    Temp: 99 °F (37.2 °C)    TempSrc: Oral    SpO2: 96%    Weight: 78.9 kg (174 lb)    Height: 5' 6" (1.676 m)    PainSc: 0-No pain       Body mass index is 28.08 kg/m².  Ideal body weight: 59.3 kg (130 lb 11.7 oz)       Physical Exam  Constitutional:       General: She is not in acute distress.     Appearance: Normal appearance.   HENT:      Nose: Nose normal.      Mouth/Throat:      Mouth: Mucous membranes are moist.      Pharynx: Oropharynx is clear.   Eyes:      Conjunctiva/sclera: Conjunctivae normal.   Cardiovascular:      Rate and Rhythm: Normal rate and regular rhythm.      Heart sounds: Normal heart sounds. No murmur heard.  Pulmonary:      Effort: Pulmonary effort is normal. No respiratory distress.      Breath sounds: Normal breath sounds. No wheezing, rhonchi or rales.   Abdominal:      General: Bowel sounds are normal.      Palpations: Abdomen is soft.      Tenderness: There is no abdominal tenderness.   Musculoskeletal:      Cervical back: Neck supple.      Right lower leg: No edema.      Left lower leg: No edema.      Right foot: Normal range of motion. No deformity, bunion, Charcot foot, foot drop or prominent metatarsal heads.      Left foot: Normal range of motion. No deformity, bunion, Charcot foot, foot drop or prominent metatarsal heads.      Comments: + trace edema bilateral ankles   Feet:      Right foot:      Protective Sensation: 10 sites tested.  7 sites sensed.      Skin integrity: Callus present. No ulcer, blister, skin breakdown, erythema, warmth, dry skin or fissure.      Toenail Condition: Right toenails are normal.      Left foot:      Protective Sensation: 10 sites tested.  7 sites sensed.      Skin integrity: Callus present. No ulcer, blister, skin breakdown, erythema, warmth, dry skin or fissure.      Toenail Condition: Left toenails are normal.   Skin:     Findings: No rash.   Neurological:      General: No focal " deficit present.      Mental Status: She is alert. Mental status is at baseline.   Psychiatric:         Mood and Affect: Mood normal.     Protective Sensation (w/ 10 gram monofilament):  Right: Decreased  Left: Decreased    Visual Inspection:  Callus -  Bilateral    Pedal Pulses:   Right: Present  Left: Present    Posterior tibialis:   Right:Present  Left: Present      Assessment:     1. Encounter for subsequent annual wellness visit (AWV) in Medicare patient    2. BMI 28.0-28.9,adult  - Diet and exercise    3. Type 2 diabetes mellitus with other specified complication, without long-term current use of insulin  - Hemoglobin A1C; Future  - Hemoglobin A1C    4. Chronic combined systolic and diastolic congestive heart failure  - continue current medications and plan.  - follow up with Cardiology as scheduled    5. PAD (peripheral artery disease)  - continue current medications and plan.   - follow up with Cardiology as scheduled    6. Mild chronic obstructive pulmonary disease  - improved. Continue current medications and plan.    7. History of CVA (cerebrovascular accident)  - doing well. Continue current medications. Maintain good control of blood glucose, blood pressure and cholesterol. Diet and exercise.    8. Lumbar disc disease  - stable. Continue current medications and plan.    9. Other depression  - improved on current medication regimen. Continue lexapro.    10. Actinic keratoses  - follow up with Dermatology.    11. Essential hypertension  - well controlled.   - continue coreg, lasix, valsartan, and spironolactone    12. Coronary artery disease involving native coronary artery of native heart without angina pectoris  - stable. Follow up with Cardiology as scheduled.    13. Ischemic cardiomyopathy  - stable. Follow up with Cardiology as scheduled.    14. Mixed hyperlipidemia  - continue lipitor  - low cholesterol diet and exercise    15. Bilateral carotid bruits  - stable. Follow up with Cardiology as  scheduled.    16. Peripheral vascular disease  - stable. Continue pletal. Follow up with Cardiology as scheduled.    17. Iron deficiency anemia due to chronic blood loss  - stable. RTC in March for repeat CBC and iron studies.    18. B12 deficiency  - continue supplementation    19. History of colon polyps  - colonoscopy up to date    20. Gastroesophageal reflux disease without esophagitis  - continue prn use of OTC medications    21. Diverticula, colon  - colonoscopy up to date.    22. Primary osteoarthritis involving multiple joints  - continue current medications and plan.     23. History of degenerative disc disease  - continue current medications and plan.        Problem List Items Addressed This Visit          Neuro    History of CVA (cerebrovascular accident) (Chronic)    Lumbar disc disease (Chronic)       Psychiatric    Depression (Chronic)       Derm    Actinic keratoses (Chronic)       Pulmonary    Mild chronic obstructive pulmonary disease (Chronic)       Cardiac/Vascular    Essential hypertension (Chronic)    CAD (coronary artery disease) (Chronic)    CHF (congestive heart failure) (Chronic)    Ischemic cardiomyopathy (Chronic)    Mixed hyperlipidemia (Chronic)    Bilateral carotid bruits (Chronic)    Peripheral vascular disease (Chronic)    RESOLVED: PAD (peripheral artery disease) (Chronic)       Oncology    Iron deficiency anemia due to chronic blood loss (Chronic)       Endocrine    BMI 28.0-28.9,adult (Chronic)    B12 deficiency (Chronic)    RESOLVED: Type 2 diabetes mellitus without complication, without long-term current use of insulin       GI    Gastroesophageal reflux disease without esophagitis (Chronic)    History of colon polyps (Chronic)    Diverticula, colon (Chronic)       Orthopedic    OA (osteoarthritis) (Chronic)    History of degenerative disc disease (Chronic)     Other Visit Diagnoses       Encounter for subsequent annual wellness visit (AWV) in Medicare patient    -  Primary             Plan:    Referrals:   none     Advised to call office if does not hear from anyone with referral appt within 2-3 weeks to check on status of referral. Voiced understanding.      Discussed and provided with a screening schedule and personal prevention plan in accordance with USPSTF age appropriate recommendations and Medicare screening guidelines.   Education, counseling, and referrals were provided as needed.  After Visit Summary printed and given to patient which includes written education and a list of any referrals if indicated.     Education including diet, exercise, falls, preventive health care for older adults and advanced directives discussed with patient and patient verbalized understanding.      F/u plan for yearly AWV. RTC in March 2023 for chronic follow up and labs.    Signature: Adriana Montero MD

## 2023-01-31 ENCOUNTER — OFFICE VISIT (OUTPATIENT)
Dept: FAMILY MEDICINE | Facility: CLINIC | Age: 73
End: 2023-01-31
Payer: MEDICARE

## 2023-01-31 VITALS
TEMPERATURE: 99 F | SYSTOLIC BLOOD PRESSURE: 134 MMHG | RESPIRATION RATE: 14 BRPM | DIASTOLIC BLOOD PRESSURE: 70 MMHG | HEART RATE: 81 BPM | HEIGHT: 66 IN | BODY MASS INDEX: 27.97 KG/M2 | OXYGEN SATURATION: 96 % | WEIGHT: 174 LBS

## 2023-01-31 DIAGNOSIS — Z00.00 ENCOUNTER FOR SUBSEQUENT ANNUAL WELLNESS VISIT (AWV) IN MEDICARE PATIENT: Primary | ICD-10-CM

## 2023-01-31 DIAGNOSIS — I25.10 CORONARY ARTERY DISEASE INVOLVING NATIVE CORONARY ARTERY OF NATIVE HEART WITHOUT ANGINA PECTORIS: Chronic | ICD-10-CM

## 2023-01-31 DIAGNOSIS — I25.5 ISCHEMIC CARDIOMYOPATHY: Chronic | ICD-10-CM

## 2023-01-31 DIAGNOSIS — E11.69 TYPE 2 DIABETES MELLITUS WITH OTHER SPECIFIED COMPLICATION, WITHOUT LONG-TERM CURRENT USE OF INSULIN: Chronic | ICD-10-CM

## 2023-01-31 DIAGNOSIS — R09.89 BILATERAL CAROTID BRUITS: Chronic | ICD-10-CM

## 2023-01-31 DIAGNOSIS — D50.0 IRON DEFICIENCY ANEMIA DUE TO CHRONIC BLOOD LOSS: Chronic | ICD-10-CM

## 2023-01-31 DIAGNOSIS — J44.9 MILD CHRONIC OBSTRUCTIVE PULMONARY DISEASE: Chronic | ICD-10-CM

## 2023-01-31 DIAGNOSIS — M51.9 LUMBAR DISC DISEASE: Chronic | ICD-10-CM

## 2023-01-31 DIAGNOSIS — L57.0 ACTINIC KERATOSES: Chronic | ICD-10-CM

## 2023-01-31 DIAGNOSIS — E78.2 MIXED HYPERLIPIDEMIA: Chronic | ICD-10-CM

## 2023-01-31 DIAGNOSIS — M15.9 PRIMARY OSTEOARTHRITIS INVOLVING MULTIPLE JOINTS: Chronic | ICD-10-CM

## 2023-01-31 DIAGNOSIS — I73.9 PAD (PERIPHERAL ARTERY DISEASE): Chronic | ICD-10-CM

## 2023-01-31 DIAGNOSIS — F32.89 OTHER DEPRESSION: Chronic | ICD-10-CM

## 2023-01-31 DIAGNOSIS — E53.8 B12 DEFICIENCY: Chronic | ICD-10-CM

## 2023-01-31 DIAGNOSIS — Z87.39 HISTORY OF DEGENERATIVE DISC DISEASE: Chronic | ICD-10-CM

## 2023-01-31 DIAGNOSIS — I73.9 PERIPHERAL VASCULAR DISEASE: Chronic | ICD-10-CM

## 2023-01-31 DIAGNOSIS — K57.30 DIVERTICULA, COLON: Chronic | ICD-10-CM

## 2023-01-31 DIAGNOSIS — I10 ESSENTIAL HYPERTENSION: Chronic | ICD-10-CM

## 2023-01-31 DIAGNOSIS — Z86.73 HISTORY OF CVA (CEREBROVASCULAR ACCIDENT): Chronic | ICD-10-CM

## 2023-01-31 DIAGNOSIS — I50.42 CHRONIC COMBINED SYSTOLIC AND DIASTOLIC CONGESTIVE HEART FAILURE: Chronic | ICD-10-CM

## 2023-01-31 DIAGNOSIS — Z86.010 HISTORY OF COLON POLYPS: Chronic | ICD-10-CM

## 2023-01-31 DIAGNOSIS — K21.9 GASTROESOPHAGEAL REFLUX DISEASE WITHOUT ESOPHAGITIS: Chronic | ICD-10-CM

## 2023-01-31 PROBLEM — E78.5 DYSLIPIDEMIA: Status: RESOLVED | Noted: 2022-05-16 | Resolved: 2023-01-31

## 2023-01-31 PROBLEM — D12.3 ADENOMATOUS POLYP OF TRANSVERSE COLON: Status: RESOLVED | Noted: 2022-06-21 | Resolved: 2023-01-31

## 2023-01-31 PROBLEM — D12.2 ADENOMATOUS POLYP OF ASCENDING COLON: Status: RESOLVED | Noted: 2022-06-21 | Resolved: 2023-01-31

## 2023-01-31 PROBLEM — E11.9 TYPE 2 DIABETES MELLITUS WITHOUT COMPLICATION, WITHOUT LONG-TERM CURRENT USE OF INSULIN: Status: RESOLVED | Noted: 2021-08-03 | Resolved: 2023-01-31

## 2023-01-31 LAB
EST. AVERAGE GLUCOSE BLD GHB EST-MCNC: 140 MG/DL
HBA1C MFR BLD HPLC: 6.8 % (ref 4.5–6.6)

## 2023-01-31 PROCEDURE — G0439 PR MEDICARE ANNUAL WELLNESS SUBSEQUENT VISIT: ICD-10-PCS | Mod: ,,, | Performed by: FAMILY MEDICINE

## 2023-01-31 PROCEDURE — 83036 HEMOGLOBIN GLYCOSYLATED A1C: CPT | Mod: ,,, | Performed by: CLINICAL MEDICAL LABORATORY

## 2023-01-31 PROCEDURE — G0439 PPPS, SUBSEQ VISIT: HCPCS | Mod: ,,, | Performed by: FAMILY MEDICINE

## 2023-01-31 PROCEDURE — 83036 HEMOGLOBIN A1C: ICD-10-PCS | Mod: ,,, | Performed by: CLINICAL MEDICAL LABORATORY

## 2023-01-31 RX ORDER — SPIRONOLACTONE 25 MG/1
1 TABLET ORAL
COMMUNITY

## 2023-01-31 RX ORDER — FUROSEMIDE 40 MG/1
1 TABLET ORAL
COMMUNITY
End: 2023-04-12

## 2023-01-31 RX ORDER — CYANOCOBALAMIN 1000 UG/ML
1 INJECTION, SOLUTION INTRAMUSCULAR; SUBCUTANEOUS
COMMUNITY
End: 2023-04-12 | Stop reason: SDUPTHER

## 2023-01-31 RX ORDER — ASPIRIN 81 MG/1
1 TABLET ORAL
COMMUNITY
End: 2023-04-12 | Stop reason: ALTCHOICE

## 2023-01-31 RX ORDER — CYCLOBENZAPRINE HCL 10 MG
1 TABLET ORAL NIGHTLY PRN
COMMUNITY
End: 2023-04-12 | Stop reason: SDUPTHER

## 2023-01-31 RX ORDER — PREDNISOLONE ACETATE 10 MG/ML
1 SUSPENSION/ DROPS OPHTHALMIC 4 TIMES DAILY
COMMUNITY
Start: 2022-10-12 | End: 2023-09-12 | Stop reason: ALTCHOICE

## 2023-01-31 NOTE — PATIENT INSTRUCTIONS
Counseling and Referral of Other Preventative  (Italic type indicates deductible and co-insurance are waived)    Patient Name: Maddie Ya  Today's Date: 1/31/2023    Health Maintenance         Date Due Completion Date    Foot Exam Never done ---    Eye Exam Never done ---    TETANUS VACCINE Never done ---    Shingles Vaccine (1 of 2) Never done ---    COVID-19 Vaccine (4 - Booster for Moderna series) 01/11/2022 11/16/2021    Mammogram 04/28/2022 4/28/2021    Influenza Vaccine (1) Never done ---    Hemoglobin A1c 11/03/2022 8/3/2022    Diabetes Urine Screening 04/05/2023 4/5/2022    LDCT Lung Screen 05/10/2023 5/10/2022    High Dose Statin 08/03/2023 8/3/2022    Lipid Panel 08/03/2023 8/3/2022    DEXA Scan 03/09/2025 3/9/2020    Colorectal Cancer Screening 06/21/2025 6/21/2022          Orders Placed This Encounter   Procedures    Hemoglobin A1C

## 2023-04-12 ENCOUNTER — OFFICE VISIT (OUTPATIENT)
Dept: FAMILY MEDICINE | Facility: CLINIC | Age: 73
End: 2023-04-12
Payer: MEDICARE

## 2023-04-12 VITALS
DIASTOLIC BLOOD PRESSURE: 80 MMHG | OXYGEN SATURATION: 98 % | WEIGHT: 177.5 LBS | HEIGHT: 66 IN | TEMPERATURE: 98 F | BODY MASS INDEX: 28.53 KG/M2 | RESPIRATION RATE: 20 BRPM | HEART RATE: 75 BPM | SYSTOLIC BLOOD PRESSURE: 156 MMHG

## 2023-04-12 DIAGNOSIS — R11.0 NAUSEA: ICD-10-CM

## 2023-04-12 DIAGNOSIS — D50.0 IRON DEFICIENCY ANEMIA DUE TO CHRONIC BLOOD LOSS: Chronic | ICD-10-CM

## 2023-04-12 DIAGNOSIS — K21.9 GASTROESOPHAGEAL REFLUX DISEASE WITHOUT ESOPHAGITIS: Chronic | ICD-10-CM

## 2023-04-12 DIAGNOSIS — Z79.899 OTHER LONG TERM (CURRENT) DRUG THERAPY: ICD-10-CM

## 2023-04-12 DIAGNOSIS — E78.2 MIXED HYPERLIPIDEMIA: Chronic | ICD-10-CM

## 2023-04-12 DIAGNOSIS — E87.6 HYPOKALEMIA: Chronic | ICD-10-CM

## 2023-04-12 DIAGNOSIS — I25.118 ATHEROSCLEROTIC HEART DISEASE OF NATIVE CORONARY ARTERY WITH OTHER FORMS OF ANGINA PECTORIS: Chronic | ICD-10-CM

## 2023-04-12 DIAGNOSIS — J30.2 SEASONAL ALLERGIES: Chronic | ICD-10-CM

## 2023-04-12 DIAGNOSIS — R53.83 OTHER FATIGUE: ICD-10-CM

## 2023-04-12 DIAGNOSIS — M51.9 LUMBAR DISC DISEASE: Chronic | ICD-10-CM

## 2023-04-12 DIAGNOSIS — E11.9 TYPE 2 DIABETES MELLITUS WITHOUT COMPLICATION, WITHOUT LONG-TERM CURRENT USE OF INSULIN: Primary | Chronic | ICD-10-CM

## 2023-04-12 DIAGNOSIS — F32.89 OTHER DEPRESSION: Chronic | ICD-10-CM

## 2023-04-12 DIAGNOSIS — M62.838 MUSCLE SPASM: Chronic | ICD-10-CM

## 2023-04-12 DIAGNOSIS — E53.8 B12 DEFICIENCY: Chronic | ICD-10-CM

## 2023-04-12 DIAGNOSIS — I10 ESSENTIAL HYPERTENSION: Chronic | ICD-10-CM

## 2023-04-12 DIAGNOSIS — I73.9 PERIPHERAL VASCULAR DISEASE: Chronic | ICD-10-CM

## 2023-04-12 LAB
25(OH)D3 SERPL-MCNC: 13.6 NG/ML
ALBUMIN SERPL BCP-MCNC: 3.3 G/DL (ref 3.5–5)
ALBUMIN/GLOB SERPL: 1 {RATIO}
ALP SERPL-CCNC: 74 U/L (ref 55–142)
ALT SERPL W P-5'-P-CCNC: 16 U/L (ref 13–56)
ANION GAP SERPL CALCULATED.3IONS-SCNC: 9 MMOL/L (ref 7–16)
AST SERPL W P-5'-P-CCNC: 13 U/L (ref 15–37)
BASOPHILS # BLD AUTO: 0.04 K/UL (ref 0–0.2)
BASOPHILS NFR BLD AUTO: 0.6 % (ref 0–1)
BILIRUB SERPL-MCNC: 0.3 MG/DL (ref ?–1.2)
BUN SERPL-MCNC: 11 MG/DL (ref 7–18)
BUN/CREAT SERPL: 16 (ref 6–20)
CALCIUM SERPL-MCNC: 9.1 MG/DL (ref 8.5–10.1)
CHLORIDE SERPL-SCNC: 108 MMOL/L (ref 98–107)
CHOLEST SERPL-MCNC: 214 MG/DL (ref 0–200)
CHOLEST/HDLC SERPL: 2.9 {RATIO}
CO2 SERPL-SCNC: 29 MMOL/L (ref 21–32)
CREAT SERPL-MCNC: 0.7 MG/DL (ref 0.55–1.02)
CREAT UR-MCNC: 111 MG/DL (ref 28–219)
DIFFERENTIAL METHOD BLD: ABNORMAL
EGFR (NO RACE VARIABLE) (RUSH/TITUS): 92 ML/MIN/1.73M²
EOSINOPHIL # BLD AUTO: 0.12 K/UL (ref 0–0.5)
EOSINOPHIL NFR BLD AUTO: 1.8 % (ref 1–4)
ERYTHROCYTE [DISTWIDTH] IN BLOOD BY AUTOMATED COUNT: 15.9 % (ref 11.5–14.5)
EST. AVERAGE GLUCOSE BLD GHB EST-MCNC: 147 MG/DL
FERRITIN SERPL-MCNC: 9 NG/ML (ref 8–252)
FOLATE SERPL-MCNC: 7.4 NG/ML (ref 3.1–17.5)
GLOBULIN SER-MCNC: 3.4 G/DL (ref 2–4)
GLUCOSE SERPL-MCNC: 112 MG/DL (ref 74–106)
HBA1C MFR BLD HPLC: 7 % (ref 4.5–6.6)
HCT VFR BLD AUTO: 40.4 % (ref 38–47)
HDLC SERPL-MCNC: 75 MG/DL (ref 40–60)
HGB BLD-MCNC: 12.5 G/DL (ref 12–16)
IMM GRANULOCYTES # BLD AUTO: 0.01 K/UL (ref 0–0.04)
IMM GRANULOCYTES NFR BLD: 0.1 % (ref 0–0.4)
LDLC SERPL CALC-MCNC: 119 MG/DL
LDLC/HDLC SERPL: 1.6 {RATIO}
LYMPHOCYTES # BLD AUTO: 2.19 K/UL (ref 1–4.8)
LYMPHOCYTES NFR BLD AUTO: 32.5 % (ref 27–41)
MCH RBC QN AUTO: 28.2 PG (ref 27–31)
MCHC RBC AUTO-ENTMCNC: 30.9 G/DL (ref 32–36)
MCV RBC AUTO: 91.2 FL (ref 80–96)
MICROALBUMIN UR-MCNC: 1.2 MG/DL (ref 0–2.8)
MICROALBUMIN/CREAT RATIO PNL UR: 10.8 MG/G (ref 0–30)
MONOCYTES # BLD AUTO: 0.59 K/UL (ref 0–0.8)
MONOCYTES NFR BLD AUTO: 8.8 % (ref 2–6)
MPC BLD CALC-MCNC: 9.7 FL (ref 9.4–12.4)
NEUTROPHILS # BLD AUTO: 3.78 K/UL (ref 1.8–7.7)
NEUTROPHILS NFR BLD AUTO: 56.2 % (ref 53–65)
NONHDLC SERPL-MCNC: 139 MG/DL
NRBC # BLD AUTO: 0 X10E3/UL
NRBC, AUTO (.00): 0 %
PLATELET # BLD AUTO: 209 K/UL (ref 150–400)
POTASSIUM SERPL-SCNC: 3.6 MMOL/L (ref 3.5–5.1)
PROT SERPL-MCNC: 6.7 G/DL (ref 6.4–8.2)
RBC # BLD AUTO: 4.43 M/UL (ref 4.2–5.4)
SODIUM SERPL-SCNC: 142 MMOL/L (ref 136–145)
T4 FREE SERPL-MCNC: 1.05 NG/DL (ref 0.76–1.46)
TRIGL SERPL-MCNC: 99 MG/DL (ref 35–150)
TSH SERPL DL<=0.005 MIU/L-ACNC: 1.93 UIU/ML (ref 0.36–3.74)
VIT B12 SERPL-MCNC: 126 PG/ML (ref 193–986)
VLDLC SERPL-MCNC: 20 MG/DL
WBC # BLD AUTO: 6.73 K/UL (ref 4.5–11)

## 2023-04-12 PROCEDURE — 82607 VITAMIN B-12: CPT | Mod: ,,, | Performed by: CLINICAL MEDICAL LABORATORY

## 2023-04-12 PROCEDURE — 84439 T4, FREE: ICD-10-PCS | Mod: ,,, | Performed by: CLINICAL MEDICAL LABORATORY

## 2023-04-12 PROCEDURE — 82043 UR ALBUMIN QUANTITATIVE: CPT | Mod: ,,, | Performed by: CLINICAL MEDICAL LABORATORY

## 2023-04-12 PROCEDURE — 83036 HEMOGLOBIN A1C: ICD-10-PCS | Mod: ,,, | Performed by: CLINICAL MEDICAL LABORATORY

## 2023-04-12 PROCEDURE — 82306 VITAMIN D 25 HYDROXY: CPT | Mod: ,,, | Performed by: CLINICAL MEDICAL LABORATORY

## 2023-04-12 PROCEDURE — 82570 MICROALBUMIN / CREATININE RATIO URINE: ICD-10-PCS | Mod: ,,, | Performed by: CLINICAL MEDICAL LABORATORY

## 2023-04-12 PROCEDURE — 84443 TSH: ICD-10-PCS | Mod: ,,, | Performed by: CLINICAL MEDICAL LABORATORY

## 2023-04-12 PROCEDURE — 85025 COMPLETE CBC W/AUTO DIFF WBC: CPT | Mod: ,,, | Performed by: CLINICAL MEDICAL LABORATORY

## 2023-04-12 PROCEDURE — 80061 LIPID PANEL: ICD-10-PCS | Mod: ,,, | Performed by: CLINICAL MEDICAL LABORATORY

## 2023-04-12 PROCEDURE — 82570 ASSAY OF URINE CREATININE: CPT | Mod: ,,, | Performed by: CLINICAL MEDICAL LABORATORY

## 2023-04-12 PROCEDURE — 82728 FERRITIN: ICD-10-PCS | Mod: ,,, | Performed by: CLINICAL MEDICAL LABORATORY

## 2023-04-12 PROCEDURE — 80061 LIPID PANEL: CPT | Mod: ,,, | Performed by: CLINICAL MEDICAL LABORATORY

## 2023-04-12 PROCEDURE — 80053 COMPREHENSIVE METABOLIC PANEL: ICD-10-PCS | Mod: ,,, | Performed by: CLINICAL MEDICAL LABORATORY

## 2023-04-12 PROCEDURE — 99214 PR OFFICE/OUTPT VISIT, EST, LEVL IV, 30-39 MIN: ICD-10-PCS | Mod: ,,, | Performed by: FAMILY MEDICINE

## 2023-04-12 PROCEDURE — 82043 MICROALBUMIN / CREATININE RATIO URINE: ICD-10-PCS | Mod: ,,, | Performed by: CLINICAL MEDICAL LABORATORY

## 2023-04-12 PROCEDURE — 82728 ASSAY OF FERRITIN: CPT | Mod: ,,, | Performed by: CLINICAL MEDICAL LABORATORY

## 2023-04-12 PROCEDURE — 84439 ASSAY OF FREE THYROXINE: CPT | Mod: ,,, | Performed by: CLINICAL MEDICAL LABORATORY

## 2023-04-12 PROCEDURE — 80053 COMPREHEN METABOLIC PANEL: CPT | Mod: ,,, | Performed by: CLINICAL MEDICAL LABORATORY

## 2023-04-12 PROCEDURE — 85025 CBC WITH DIFFERENTIAL: ICD-10-PCS | Mod: ,,, | Performed by: CLINICAL MEDICAL LABORATORY

## 2023-04-12 PROCEDURE — 82746 ASSAY OF FOLIC ACID SERUM: CPT | Mod: ,,, | Performed by: CLINICAL MEDICAL LABORATORY

## 2023-04-12 PROCEDURE — 84443 ASSAY THYROID STIM HORMONE: CPT | Mod: ,,, | Performed by: CLINICAL MEDICAL LABORATORY

## 2023-04-12 PROCEDURE — 99214 OFFICE O/P EST MOD 30 MIN: CPT | Mod: ,,, | Performed by: FAMILY MEDICINE

## 2023-04-12 PROCEDURE — 82746 FOLATE: ICD-10-PCS | Mod: ,,, | Performed by: CLINICAL MEDICAL LABORATORY

## 2023-04-12 PROCEDURE — 82607 VITAMIN B12: ICD-10-PCS | Mod: ,,, | Performed by: CLINICAL MEDICAL LABORATORY

## 2023-04-12 PROCEDURE — 82306 VITAMIN D: ICD-10-PCS | Mod: ,,, | Performed by: CLINICAL MEDICAL LABORATORY

## 2023-04-12 PROCEDURE — 83036 HEMOGLOBIN GLYCOSYLATED A1C: CPT | Mod: ,,, | Performed by: CLINICAL MEDICAL LABORATORY

## 2023-04-12 RX ORDER — FERROUS SULFATE 325(65) MG
325 TABLET ORAL
Qty: 90 TABLET | Refills: 1 | Status: SHIPPED | OUTPATIENT
Start: 2023-04-12 | End: 2024-01-26

## 2023-04-12 RX ORDER — CYCLOBENZAPRINE HCL 10 MG
10 TABLET ORAL 3 TIMES DAILY PRN
COMMUNITY
End: 2023-04-12 | Stop reason: SDUPTHER

## 2023-04-12 RX ORDER — CYANOCOBALAMIN 1000 UG/ML
INJECTION, SOLUTION INTRAMUSCULAR; SUBCUTANEOUS
Qty: 6 ML | Refills: 1 | Status: SHIPPED | OUTPATIENT
Start: 2023-04-12 | End: 2023-04-19 | Stop reason: SDUPTHER

## 2023-04-12 RX ORDER — POTASSIUM CHLORIDE 1500 MG/1
20 TABLET, EXTENDED RELEASE ORAL DAILY
Qty: 90 TABLET | Refills: 1 | Status: SHIPPED | OUTPATIENT
Start: 2023-04-12 | End: 2023-12-07 | Stop reason: SDUPTHER

## 2023-04-12 RX ORDER — VALSARTAN 80 MG/1
80 TABLET ORAL DAILY
Qty: 90 TABLET | Refills: 1 | Status: SHIPPED | OUTPATIENT
Start: 2023-04-12 | End: 2023-12-07 | Stop reason: SDUPTHER

## 2023-04-12 RX ORDER — CYCLOBENZAPRINE HCL 10 MG
10 TABLET ORAL 3 TIMES DAILY PRN
Qty: 45 TABLET | Refills: 2 | Status: SHIPPED | OUTPATIENT
Start: 2023-04-12 | End: 2023-12-07 | Stop reason: SDUPTHER

## 2023-04-12 RX ORDER — METFORMIN HYDROCHLORIDE 500 MG/1
500 TABLET, EXTENDED RELEASE ORAL 2 TIMES DAILY WITH MEALS
Qty: 180 TABLET | Refills: 1 | Status: SHIPPED | OUTPATIENT
Start: 2023-04-12 | End: 2023-06-05 | Stop reason: ALTCHOICE

## 2023-04-12 RX ORDER — CILOSTAZOL 100 MG/1
100 TABLET ORAL 2 TIMES DAILY
Qty: 180 TABLET | Refills: 1 | Status: SHIPPED | OUTPATIENT
Start: 2023-04-12

## 2023-04-12 RX ORDER — ASPIRIN 81 MG/1
1 TABLET ORAL DAILY
COMMUNITY

## 2023-04-12 RX ORDER — ATORVASTATIN CALCIUM 80 MG/1
80 TABLET, FILM COATED ORAL NIGHTLY
Qty: 90 TABLET | Refills: 1 | Status: SHIPPED | OUTPATIENT
Start: 2023-04-12 | End: 2023-12-07 | Stop reason: SDUPTHER

## 2023-04-12 RX ORDER — CETIRIZINE HYDROCHLORIDE 10 MG/1
10 TABLET ORAL DAILY
Qty: 90 TABLET | Refills: 1 | Status: SHIPPED | OUTPATIENT
Start: 2023-04-12 | End: 2023-12-07 | Stop reason: SDUPTHER

## 2023-04-12 RX ORDER — PROMETHAZINE HYDROCHLORIDE 25 MG/1
25 TABLET ORAL EVERY 6 HOURS PRN
Qty: 30 TABLET | Refills: 0 | Status: SHIPPED | OUTPATIENT
Start: 2023-04-12 | End: 2023-12-07 | Stop reason: SDUPTHER

## 2023-04-12 RX ORDER — FUROSEMIDE 40 MG/1
40 TABLET ORAL DAILY PRN
Qty: 90 TABLET | Refills: 1 | Status: SHIPPED | OUTPATIENT
Start: 2023-04-12 | End: 2023-12-07 | Stop reason: SDUPTHER

## 2023-04-12 RX ORDER — FLUTICASONE PROPIONATE 50 MCG
1 SPRAY, SUSPENSION (ML) NASAL DAILY
Qty: 15.8 ML | Refills: 3 | Status: SHIPPED | OUTPATIENT
Start: 2023-04-12

## 2023-04-12 RX ORDER — ESCITALOPRAM OXALATE 20 MG/1
20 TABLET ORAL DAILY
Qty: 90 TABLET | Refills: 1 | Status: SHIPPED | OUTPATIENT
Start: 2023-04-12 | End: 2023-10-10

## 2023-04-12 RX ORDER — CARVEDILOL 6.25 MG/1
6.25 TABLET ORAL 2 TIMES DAILY
Qty: 180 TABLET | Refills: 1 | Status: SHIPPED | OUTPATIENT
Start: 2023-04-12 | End: 2023-12-07 | Stop reason: SDUPTHER

## 2023-04-12 RX ORDER — OMEPRAZOLE 20 MG/1
20 CAPSULE, DELAYED RELEASE ORAL DAILY
Qty: 90 CAPSULE | Refills: 1 | Status: SHIPPED | OUTPATIENT
Start: 2023-04-12 | End: 2023-08-15 | Stop reason: ALTCHOICE

## 2023-04-12 NOTE — PROGRESS NOTES
Clinic Note    Patient Name: Maddie Ya  : 1950  MRN: [id number]    Chief Complaint   Patient presents with    Follow-up     Check up with medication refills       HPI:    Ms. Maddie Ya is a 72 y.o. female who presents to clinic today with CC of follow up on chronic disease processes including HTN, HLD, CAD, CHF, PVD, h/o CVA, anemia, COPD, depression, GERD, OA, and vitamin B12 deficiency.   BP is elevated today. Patient reports she has not yet taken her medication.   Patient reports chronic issues are well controlled on current medication regimen.  Denies problems or side effects with medications.  Patient is, otherwise, without complaints.     Medications:  Medication List with Changes/Refills   Current Medications    ASPIRIN (ADULT LOW DOSE ASPIRIN) 81 MG EC TABLET    Take 1 tablet by mouth Daily.    NEOMYCIN-POLYMYXIN-DEXAMETHASONE (DEXACINE) 3.5 MG/G-10,000 UNIT/G-0.1 % OINT    Place 1 drop into both eyes every 6 (six) hours.    PREDNISOLONE ACETATE (PRED FORTE) 1 % DRPS    Place 1 drop into both eyes 4 (four) times daily.    SPIRONOLACTONE (ALDACTONE) 25 MG TABLET    Take 1 tablet (25 mg total) by mouth daily as needed (swelling).    SPIRONOLACTONE (ALDACTONE) 25 MG TABLET    1 tablet.    TRUE METRIX GLUCOSE TEST STRIP STRP    Inject 1-2 Sticks into the skin once daily at 6am.    VIT C/E/ZN/COPPR/LUTEIN/ZEAXAN (PRESERVISION AREDS-2 ORAL)    Take by mouth.   Changed and/or Refilled Medications    Modified Medication Previous Medication    ATORVASTATIN (LIPITOR) 80 MG TABLET atorvastatin (LIPITOR) 80 MG tablet       Take 1 tablet (80 mg total) by mouth every evening.    Take 1 tablet (80 mg total) by mouth once daily.    CARVEDILOL (COREG) 6.25 MG TABLET carvediloL (COREG) 6.25 MG tablet       Take 1 tablet (6.25 mg total) by mouth 2 (two) times daily.    Take 1 tablet (6.25 mg total) by mouth 2 (two) times daily.    CETIRIZINE (ZYRTEC) 10 MG TABLET cetirizine (ZYRTEC) 10 MG tablet       Take 1  tablet (10 mg total) by mouth once daily.    Take 1 tablet by mouth once daily.    CILOSTAZOL (PLETAL) 100 MG TAB cilostazoL (PLETAL) 100 MG Tab       Take 1 tablet (100 mg total) by mouth 2 (two) times daily.    Take 1 tablet (100 mg total) by mouth 2 (two) times daily.    CYANOCOBALAMIN 1,000 MCG/ML INJECTION cyanocobalamin 1,000 mcg/mL injection       once every 2 weeks X 2 months and then monthly for 6 months    once every 2 weeks X 2 months and then monthly for 6 months    CYCLOBENZAPRINE (FLEXERIL) 10 MG TABLET cyclobenzaprine (FLEXERIL) 10 MG tablet       Take 1 tablet (10 mg total) by mouth 3 (three) times daily as needed for Muscle spasms.    Take 10 mg by mouth 3 (three) times daily as needed.    ESCITALOPRAM OXALATE (LEXAPRO) 20 MG TABLET EScitalopram oxalate (LEXAPRO) 20 MG tablet       Take 1 tablet (20 mg total) by mouth once daily.    Take 1 tablet (20 mg total) by mouth once daily.    FERROUS SULFATE (FEOSOL) 325 MG (65 MG IRON) TAB TABLET ferrous sulfate (FEOSOL) 325 mg (65 mg iron) Tab tablet       Take 1 tablet (325 mg total) by mouth daily with breakfast.    Take 1 tablet (325 mg total) by mouth daily with breakfast.    FLUTICASONE PROPIONATE (FLONASE) 50 MCG/ACTUATION NASAL SPRAY fluticasone propionate (FLONASE) 50 mcg/actuation nasal spray       1 spray (50 mcg total) by Each Nostril route once daily.    1 spray by Each Nostril route once daily.    FUROSEMIDE (LASIX) 40 MG TABLET furosemide (LASIX) 40 MG tablet       Take 1 tablet (40 mg total) by mouth daily as needed (swelling).    Take 1 tablet (40 mg total) by mouth daily as needed (swelling).    METFORMIN (GLUCOPHAGE-XR) 500 MG ER 24HR TABLET metFORMIN (GLUCOPHAGE-XR) 500 MG ER 24hr tablet       Take 1 tablet (500 mg total) by mouth 2 (two) times daily with meals.    Take 1 tablet (500 mg total) by mouth 2 (two) times daily with meals.    OMEPRAZOLE (PRILOSEC) 20 MG CAPSULE omeprazole (PRILOSEC) 20 MG capsule       Take 1 capsule (20 mg  total) by mouth once daily.    Take 1 capsule (20 mg total) by mouth once daily.    POTASSIUM CHLORIDE (K-TAB) 20 MEQ potassium chloride (K-TAB) 20 mEq       Take 1 tablet (20 mEq total) by mouth once daily.    Take 1 tablet (20 mEq total) by mouth once daily.    PROMETHAZINE (PHENERGAN) 25 MG TABLET promethazine (PHENERGAN) 25 MG tablet       Take 1 tablet (25 mg total) by mouth every 6 (six) hours as needed for Nausea (may cause drowsiness).    Take 1 tablet (25 mg total) by mouth every 6 (six) hours as needed for Nausea (may cause drowsiness).    VALSARTAN (DIOVAN) 80 MG TABLET valsartan (DIOVAN) 80 MG tablet       Take 1 tablet (80 mg total) by mouth once daily.    Take 1 tablet (80 mg total) by mouth once daily.   Discontinued Medications    ASPIRIN (ECOTRIN) 81 MG EC TABLET    Take 1 tablet by mouth once daily.    ASPIRIN (ECOTRIN) 81 MG EC TABLET    1 tablet.    CYANOCOBALAMIN 1,000 MCG/ML INJECTION    1 mL.    CYCLOBENZAPRINE (FLEXERIL) 10 MG TABLET    Take 1 tablet (10 mg total) by mouth 3 (three) times daily as needed for Muscle spasms (may cause drowsiness).    CYCLOBENZAPRINE (FLEXERIL) 10 MG TABLET    1 tablet nightly as needed.    FUROSEMIDE (LASIX) 40 MG TABLET    1 tablet.        Allergies: Hydromorphone and Tuberculin, purified protein derivative      Past Medical History:    Past Medical History:   Diagnosis Date    Adenomatous polyp of ascending colon 06/21/2022    Adenomatous polyp of transverse colon 06/21/2022    B12 deficiency     Bilateral carotid bruits     CAD (coronary artery disease)     sees Halina    CHF (congestive heart failure)     CVA (cerebral vascular accident)     Depression     Diabetes mellitus, type 2     Diverticula, colon 06/21/2022    GERD (gastroesophageal reflux disease)     Heart attack     History of colon polyps     Hyperlipidemia     Hypertension     Iron deficiency anemia due to chronic blood loss 06/21/2022    Ischemic cardiomyopathy     Lumbar disc disease     OA  (osteoarthritis)     PAD (peripheral artery disease)        Past Surgical History:    Past Surgical History:   Procedure Laterality Date    ANKLE SURGERY      APPENDECTOMY      BACK SURGERY      CARDIAC CATHETERIZATION      CHOLECYSTECTOMY      COLONOSCOPY  06/23/2022    hx colon polyps    CORNEAL TRANSPLANT Bilateral     CORONARY ANGIOPLASTY WITH STENT PLACEMENT      HYSTERECTOMY      TONSILLECTOMY      TOTAL ABDOMINAL HYSTERECTOMY W/ BILATERAL SALPINGOOPHORECTOMY           Social History:    Social History     Tobacco Use   Smoking Status Every Day    Packs/day: 0.50    Years: 50.00    Pack years: 25.00    Types: Cigarettes    Start date: 1973   Smokeless Tobacco Never   Tobacco Comments    Off and on smoker approximately 50 years      Social History     Substance and Sexual Activity   Alcohol Use Never     Social History     Substance and Sexual Activity   Drug Use Never         Family History:    Family History   Problem Relation Age of Onset    Osteoarthritis Mother     Diabetes Mother     Heart failure Mother     Hyperlipidemia Mother     Hypertension Mother     Cancer Father     Diabetes Father     Hypertension Father     Heart attack Father     Lung cancer Father     Cancer Brother     Multiple myeloma Brother        Review of Systems:    Review of Systems   Constitutional:  Positive for fatigue. Negative for appetite change, chills, fever and unexpected weight change.   Eyes:  Negative for visual disturbance.   Respiratory:  Negative for cough and shortness of breath.    Cardiovascular:  Positive for leg swelling. Negative for chest pain.   Gastrointestinal:  Negative for abdominal pain, change in bowel habit, constipation, diarrhea, nausea, vomiting and change in bowel habit.        Reports some diarrhea with metformin   Genitourinary:  Positive for frequency. Negative for dysuria.   Musculoskeletal:  Positive for arthralgias.   Integumentary:  Negative for rash.   Neurological:  Negative for dizziness.     "    Reports occasional sinus HA   Psychiatric/Behavioral:  The patient is not nervous/anxious.       Vitals:    Vitals:    04/12/23 1000 04/12/23 1028   BP: (!) 140/90  Comment: left arm (!) 156/80  Comment: pt states has not had BP Medication today,   BP Location: Left arm Left arm   Patient Position: Sitting Sitting   BP Method: Large (Manual) Large (Manual)   Pulse: 75    Resp: 20    Temp: 98 °F (36.7 °C)    TempSrc: Oral    SpO2: 98%    Weight: 80.5 kg (177 lb 8 oz)    Height: 5' 6" (1.676 m)        Body mass index is 28.65 kg/m².    Wt Readings from Last 3 Encounters:   04/12/23 1000 80.5 kg (177 lb 8 oz)   01/31/23 0810 78.9 kg (174 lb)   08/03/22 1313 79.7 kg (175 lb 12.8 oz)        Physical Exam:    Physical Exam  Constitutional:       General: She is not in acute distress.     Appearance: Normal appearance.   HENT:      Nose: Nose normal.      Mouth/Throat:      Mouth: Mucous membranes are moist.      Pharynx: Oropharynx is clear.   Eyes:      Conjunctiva/sclera: Conjunctivae normal.   Cardiovascular:      Rate and Rhythm: Normal rate and regular rhythm.      Heart sounds: Normal heart sounds. No murmur heard.  Pulmonary:      Effort: Pulmonary effort is normal. No respiratory distress.      Breath sounds: Normal breath sounds. No wheezing, rhonchi or rales.   Abdominal:      General: Bowel sounds are normal.      Palpations: Abdomen is soft.      Tenderness: There is no abdominal tenderness.   Musculoskeletal:      Cervical back: Neck supple.   Skin:     Findings: No rash.   Neurological:      General: No focal deficit present.      Mental Status: She is alert. Mental status is at baseline.   Psychiatric:         Mood and Affect: Mood normal.         Assessment/Plan:   1. Type 2 diabetes mellitus without complication, without long-term current use of insulin  -     metFORMIN (GLUCOPHAGE-XR) 500 MG ER 24hr tablet; Take 1 tablet (500 mg total) by mouth 2 (two) times daily with meals.  Dispense: 180 tablet; " Refill: 1  -     CBC Auto Differential; Future; Expected date: 04/12/2023  -     Comprehensive Metabolic Panel; Future; Expected date: 04/12/2023  -     Lipid Panel; Future; Expected date: 04/12/2023  -     Hemoglobin A1C; Future; Expected date: 04/12/2023  -     Microalbumin/Creatinine Ratio, Urine    2. Hypokalemia  -     potassium chloride (K-TAB) 20 mEq; Take 1 tablet (20 mEq total) by mouth once daily.  Dispense: 90 tablet; Refill: 1    3. Other depression  -     EScitalopram oxalate (LEXAPRO) 20 MG tablet; Take 1 tablet (20 mg total) by mouth once daily.  Dispense: 90 tablet; Refill: 1    4. Gastroesophageal reflux disease without esophagitis  -     omeprazole (PRILOSEC) 20 MG capsule; Take 1 capsule (20 mg total) by mouth once daily.  Dispense: 90 capsule; Refill: 1    5. Essential hypertension  -     furosemide (LASIX) 40 MG tablet; Take 1 tablet (40 mg total) by mouth daily as needed (swelling).  Dispense: 90 tablet; Refill: 1  -     carvediloL (COREG) 6.25 MG tablet; Take 1 tablet (6.25 mg total) by mouth 2 (two) times daily.  Dispense: 180 tablet; Refill: 1  -     valsartan (DIOVAN) 80 MG tablet; Take 1 tablet (80 mg total) by mouth once daily.  Dispense: 90 tablet; Refill: 1  -     CBC Auto Differential; Future; Expected date: 04/12/2023  -     Comprehensive Metabolic Panel; Future; Expected date: 04/12/2023  -     Lipid Panel; Future; Expected date: 04/12/2023  - BP is elevated in clinic today. Patient reports normal readings at home. Will have nurse follow up with phone call with home BP reading in 1-2 weeks. If remains elevated patient will need to RTC for medication adjustments.  - DASH diet and exercise  - Encouraged medication compliance  - Continue home blood pressure monitoring. Call/RTC for persistently elevated readings.    6. Mixed hyperlipidemia  -     atorvastatin (LIPITOR) 80 MG tablet; Take 1 tablet (80 mg total) by mouth every evening.  Dispense: 90 tablet; Refill: 1  -     CBC Auto  Differential; Future; Expected date: 04/12/2023  -     Comprehensive Metabolic Panel; Future; Expected date: 04/12/2023  -     Lipid Panel; Future; Expected date: 04/12/2023    7. Atherosclerotic heart disease of native coronary artery with other forms of angina pectoris  -     cilostazoL (PLETAL) 100 MG Tab; Take 1 tablet (100 mg total) by mouth 2 (two) times daily.  Dispense: 180 tablet; Refill: 1    8. Nausea  -     promethazine (PHENERGAN) 25 MG tablet; Take 1 tablet (25 mg total) by mouth every 6 (six) hours as needed for Nausea (may cause drowsiness).  Dispense: 30 tablet; Refill: 0  - Patient takes above medication prn for nausea which she experiences intermittently and associates with some of her medications.    9. Iron deficiency anemia due to chronic blood loss  -     ferrous sulfate (FEOSOL) 325 mg (65 mg iron) Tab tablet; Take 1 tablet (325 mg total) by mouth daily with breakfast.  Dispense: 90 tablet; Refill: 1  -     CBC Auto Differential; Future; Expected date: 04/12/2023  -     Ferritin  -     Iron and TIBC    10. B12 deficiency  -     cyanocobalamin 1,000 mcg/mL injection; once every 2 weeks X 2 months and then monthly for 6 months  Dispense: 6 mL; Refill: 1  -     Vitamin B12; Future; Expected date: 04/12/2023    11. Seasonal allergies  -     fluticasone propionate (FLONASE) 50 mcg/actuation nasal spray; 1 spray (50 mcg total) by Each Nostril route once daily.  Dispense: 15.8 mL; Refill: 3  -     cetirizine (ZYRTEC) 10 MG tablet; Take 1 tablet (10 mg total) by mouth once daily.  Dispense: 90 tablet; Refill: 1    12. Lumbar disc disease  -     cyclobenzaprine (FLEXERIL) 10 MG tablet; Take 1 tablet (10 mg total) by mouth 3 (three) times daily as needed for Muscle spasms.  Dispense: 45 tablet; Refill: 2    13. Muscle spasm  -     cyclobenzaprine (FLEXERIL) 10 MG tablet; Take 1 tablet (10 mg total) by mouth 3 (three) times daily as needed for Muscle spasms.  Dispense: 45 tablet; Refill: 2    14.  Peripheral vascular disease  -     cilostazoL (PLETAL) 100 MG Tab; Take 1 tablet (100 mg total) by mouth 2 (two) times daily.  Dispense: 180 tablet; Refill: 1    15. Other fatigue  -     Vitamin D; Future; Expected date: 04/12/2023  -     TSH; Future; Expected date: 04/12/2023  -     T4, Free; Future; Expected date: 04/12/2023  -     Folate; Future; Expected date: 04/12/2023    16. Other long term (current) drug therapy  -     Vitamin D; Future; Expected date: 04/12/2023  -     Folate; Future; Expected date: 04/12/2023         Active Problem List with Overview Notes    Diagnosis Date Noted    Mild chronic obstructive pulmonary disease 05/16/2022    Atherosclerotic heart disease of native coronary artery with other forms of angina pectoris     CHF (congestive heart failure)     History of CVA (cerebrovascular accident)     Ischemic cardiomyopathy     Essential hypertension 08/03/2021    Type 2 diabetes mellitus without complication, without long-term current use of insulin 08/03/2021    Depression     Bilateral carotid bruits     Mixed hyperlipidemia 08/03/2021    Hypokalemia 04/12/2023    BMI 28.0-28.9,adult 01/31/2023    Iron deficiency anemia due to chronic blood loss 06/21/2022    Peripheral vascular disease 05/16/2022    OA (osteoarthritis)     Lumbar disc disease     Gastroesophageal reflux disease without esophagitis 08/03/2021    B12 deficiency     History of colon polyps     Actinic keratoses 03/07/2019    History of degenerative disc disease 03/07/2019    Diverticula, colon 06/21/2022    Muscle spasm 04/12/2023        Health Maintenance:  Health Maintenance   Topic Date Due    TETANUS VACCINE  01/31/2024 (Originally 11/2/1968)    Hemoglobin A1c  04/30/2023    LDCT Lung Screen  05/10/2023    Mammogram  07/07/2023    Lipid Panel  08/03/2023    Eye Exam  01/12/2024    Foot Exam  01/31/2024    High Dose Statin  04/12/2024    Aspirin/Antiplatelet Therapy  04/12/2024    DEXA Scan  03/09/2025    Hepatitis C  Screening  Completed       RTC in 4 months for chronic follow up.  RTC sooner if needed.   Patient voiced understanding and is agreeable to plan.      Dinora Montero MD    Family Medicine

## 2023-04-18 ENCOUNTER — TELEPHONE (OUTPATIENT)
Dept: FAMILY MEDICINE | Facility: CLINIC | Age: 73
End: 2023-04-18
Payer: MEDICARE

## 2023-04-18 VITALS — SYSTOLIC BLOOD PRESSURE: 138 MMHG | DIASTOLIC BLOOD PRESSURE: 78 MMHG

## 2023-04-18 NOTE — TELEPHONE ENCOUNTER
----- Message from Adriana Montero MD sent at 4/12/2023 12:07 PM CDT -----  BP elevated in clinic. Patient reports normal readings at home. Please call to record home blood pressure reading. If remains elevated or if patient has not been monitoring they will need to RTC for follow up within 2 weeks of phone call. Thanks!

## 2023-04-19 DIAGNOSIS — E53.8 B12 DEFICIENCY: Chronic | ICD-10-CM

## 2023-04-19 RX ORDER — CYANOCOBALAMIN 1000 UG/ML
INJECTION, SOLUTION INTRAMUSCULAR; SUBCUTANEOUS
Qty: 6 ML | Refills: 1 | Status: SHIPPED | OUTPATIENT
Start: 2023-04-19 | End: 2023-06-07 | Stop reason: SDUPTHER

## 2023-04-19 RX ORDER — ERGOCALCIFEROL 1.25 MG/1
50000 CAPSULE ORAL
Qty: 12 CAPSULE | Refills: 0 | Status: SHIPPED | OUTPATIENT
Start: 2023-04-19 | End: 2023-12-07

## 2023-05-09 DIAGNOSIS — Z71.89 COMPLEX CARE COORDINATION: ICD-10-CM

## 2023-06-05 ENCOUNTER — OFFICE VISIT (OUTPATIENT)
Dept: FAMILY MEDICINE | Facility: CLINIC | Age: 73
End: 2023-06-05
Payer: MEDICARE

## 2023-06-05 VITALS
HEIGHT: 66 IN | WEIGHT: 173.63 LBS | OXYGEN SATURATION: 97 % | HEART RATE: 69 BPM | BODY MASS INDEX: 27.91 KG/M2 | SYSTOLIC BLOOD PRESSURE: 136 MMHG | DIASTOLIC BLOOD PRESSURE: 80 MMHG | RESPIRATION RATE: 18 BRPM | TEMPERATURE: 99 F

## 2023-06-05 DIAGNOSIS — Z79.899 OTHER LONG TERM (CURRENT) DRUG THERAPY: ICD-10-CM

## 2023-06-05 DIAGNOSIS — Z12.31 SCREENING MAMMOGRAM FOR BREAST CANCER: ICD-10-CM

## 2023-06-05 DIAGNOSIS — E08.22 DIABETES MELLITUS DUE TO UNDERLYING CONDITION WITH CHRONIC KIDNEY DISEASE, WITHOUT LONG-TERM CURRENT USE OF INSULIN, UNSPECIFIED CKD STAGE: ICD-10-CM

## 2023-06-05 DIAGNOSIS — Z87.891 ENCOUNTER FOR SCREENING FOR MALIGNANT NEOPLASM OF LUNG IN FORMER SMOKER WHO QUIT IN PAST 15 YEARS WITH 30 PACK YEAR HISTORY OR GREATER: ICD-10-CM

## 2023-06-05 DIAGNOSIS — R10.30 LOWER ABDOMINAL PAIN: ICD-10-CM

## 2023-06-05 DIAGNOSIS — Z12.2 ENCOUNTER FOR SCREENING FOR MALIGNANT NEOPLASM OF LUNG IN FORMER SMOKER WHO QUIT IN PAST 15 YEARS WITH 30 PACK YEAR HISTORY OR GREATER: ICD-10-CM
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
## 2023-06-05 DIAGNOSIS — R53.1 GENERALIZED WEAKNESS: ICD-10-CM

## 2023-06-05 DIAGNOSIS — R53.83 OTHER FATIGUE: Primary | ICD-10-CM

## 2023-06-05 DIAGNOSIS — M25.552 LEFT HIP PAIN: ICD-10-CM

## 2023-06-05 LAB
25(OH)D3 SERPL-MCNC: 53.8 NG/ML
ALBUMIN SERPL BCP-MCNC: 3.4 G/DL (ref 3.5–5)
ALBUMIN/GLOB SERPL: 0.9 {RATIO}
ALP SERPL-CCNC: 96 U/L (ref 55–142)
ALT SERPL W P-5'-P-CCNC: 33 U/L (ref 13–56)
ANION GAP SERPL CALCULATED.3IONS-SCNC: 6 MMOL/L (ref 7–16)
AST SERPL W P-5'-P-CCNC: 24 U/L (ref 15–37)
BACTERIA #/AREA URNS HPF: ABNORMAL /HPF
BASOPHILS # BLD AUTO: 0.03 K/UL (ref 0–0.2)
BASOPHILS NFR BLD AUTO: 0.4 % (ref 0–1)
BILIRUB SERPL-MCNC: 0.4 MG/DL (ref ?–1.2)
BILIRUB UR QL STRIP: NEGATIVE
BUN SERPL-MCNC: 9 MG/DL (ref 7–18)
BUN/CREAT SERPL: 11 (ref 6–20)
CALCIUM SERPL-MCNC: 8.9 MG/DL (ref 8.5–10.1)
CHLORIDE SERPL-SCNC: 109 MMOL/L (ref 98–107)
CLARITY UR: CLEAR
CO2 SERPL-SCNC: 29 MMOL/L (ref 21–32)
COLOR UR: ABNORMAL
CREAT SERPL-MCNC: 0.81 MG/DL (ref 0.55–1.02)
DIFFERENTIAL METHOD BLD: ABNORMAL
EGFR (NO RACE VARIABLE) (RUSH/TITUS): 77 ML/MIN/1.73M2
EOSINOPHIL # BLD AUTO: 0.17 K/UL (ref 0–0.5)
EOSINOPHIL NFR BLD AUTO: 2.2 % (ref 1–4)
ERYTHROCYTE [DISTWIDTH] IN BLOOD BY AUTOMATED COUNT: 14.6 % (ref 11.5–14.5)
FERRITIN SERPL-MCNC: 19 NG/ML (ref 8–252)
FOLATE SERPL-MCNC: 6.6 NG/ML (ref 3.1–17.5)
GLOBULIN SER-MCNC: 3.6 G/DL (ref 2–4)
GLUCOSE SERPL-MCNC: 119 MG/DL (ref 74–106)
GLUCOSE UR STRIP-MCNC: NORMAL MG/DL
HCT VFR BLD AUTO: 42.7 % (ref 38–47)
HGB BLD-MCNC: 13.1 G/DL (ref 12–16)
IMM GRANULOCYTES # BLD AUTO: 0.03 K/UL (ref 0–0.04)
IMM GRANULOCYTES NFR BLD: 0.4 % (ref 0–0.4)
IRON SATN MFR SERPL: 12 % (ref 14–50)
IRON SERPL-MCNC: 42 ΜG/DL (ref 50–170)
KETONES UR STRIP-SCNC: NEGATIVE MG/DL
LEUKOCYTE ESTERASE UR QL STRIP: ABNORMAL
LYMPHOCYTES # BLD AUTO: 2.84 K/UL (ref 1–4.8)
LYMPHOCYTES NFR BLD AUTO: 36.1 % (ref 27–41)
MCH RBC QN AUTO: 29.5 PG (ref 27–31)
MCHC RBC AUTO-ENTMCNC: 30.7 G/DL (ref 32–36)
MCV RBC AUTO: 96.2 FL (ref 80–96)
MONOCYTES # BLD AUTO: 0.7 K/UL (ref 0–0.8)
MONOCYTES NFR BLD AUTO: 8.9 % (ref 2–6)
MPC BLD CALC-MCNC: 9.8 FL (ref 9.4–12.4)
MUCOUS, UA: ABNORMAL /LPF
NEUTROPHILS # BLD AUTO: 4.09 K/UL (ref 1.8–7.7)
NEUTROPHILS NFR BLD AUTO: 52 % (ref 53–65)
NITRITE UR QL STRIP: POSITIVE
NRBC # BLD AUTO: 0 X10E3/UL
NRBC, AUTO (.00): 0 %
PH UR STRIP: 5 PH UNITS
PLATELET # BLD AUTO: 208 K/UL (ref 150–400)
POTASSIUM SERPL-SCNC: 3.7 MMOL/L (ref 3.5–5.1)
PROT SERPL-MCNC: 7 G/DL (ref 6.4–8.2)
PROT UR QL STRIP: NEGATIVE
RBC # BLD AUTO: 4.44 M/UL (ref 4.2–5.4)
RBC # UR STRIP: NEGATIVE /UL
RBC #/AREA URNS HPF: <1 /HPF
SODIUM SERPL-SCNC: 140 MMOL/L (ref 136–145)
SP GR UR STRIP: 1.02
SQUAMOUS #/AREA URNS LPF: ABNORMAL /HPF
T4 FREE SERPL-MCNC: 1.04 NG/DL (ref 0.76–1.46)
TIBC SERPL-MCNC: 355 ΜG/DL (ref 250–450)
TSH SERPL DL<=0.005 MIU/L-ACNC: 2.72 UIU/ML (ref 0.36–3.74)
UROBILINOGEN UR STRIP-ACNC: NORMAL MG/DL
WBC # BLD AUTO: 7.86 K/UL (ref 4.5–11)
WBC #/AREA URNS HPF: 3 /HPF

## 2023-06-05 PROCEDURE — 99214 PR OFFICE/OUTPT VISIT, EST, LEVL IV, 30-39 MIN: ICD-10-PCS | Mod: ,,, | Performed by: FAMILY MEDICINE

## 2023-06-05 PROCEDURE — 82728 ASSAY OF FERRITIN: CPT | Mod: GZ,,, | Performed by: CLINICAL MEDICAL LABORATORY

## 2023-06-05 PROCEDURE — 86757 RICKETTSIA ANTIBODY: CPT | Mod: 90,,, | Performed by: CLINICAL MEDICAL LABORATORY

## 2023-06-05 PROCEDURE — 87077 CULTURE, URINE: ICD-10-PCS | Mod: ,,, | Performed by: CLINICAL MEDICAL LABORATORY

## 2023-06-05 PROCEDURE — 82306 VITAMIN D 25 HYDROXY: CPT | Mod: ,,, | Performed by: CLINICAL MEDICAL LABORATORY

## 2023-06-05 PROCEDURE — 87186 SC STD MICRODIL/AGAR DIL: CPT | Mod: ,,, | Performed by: CLINICAL MEDICAL LABORATORY

## 2023-06-05 PROCEDURE — 82728 FERRITIN: ICD-10-PCS | Mod: GZ,,, | Performed by: CLINICAL MEDICAL LABORATORY

## 2023-06-05 PROCEDURE — 85025 CBC WITH DIFFERENTIAL: ICD-10-PCS | Mod: ,,, | Performed by: CLINICAL MEDICAL LABORATORY

## 2023-06-05 PROCEDURE — 87086 URINE CULTURE/COLONY COUNT: CPT | Mod: ,,, | Performed by: CLINICAL MEDICAL LABORATORY

## 2023-06-05 PROCEDURE — 83540 IRON AND TIBC: ICD-10-PCS | Mod: ,,, | Performed by: CLINICAL MEDICAL LABORATORY

## 2023-06-05 PROCEDURE — 87077 CULTURE AEROBIC IDENTIFY: CPT | Mod: ,,, | Performed by: CLINICAL MEDICAL LABORATORY

## 2023-06-05 PROCEDURE — 86618 LYME ANTIBODY W/ IMMUNOBLOT REFLEX: ICD-10-PCS | Mod: ,,, | Performed by: CLINICAL MEDICAL LABORATORY

## 2023-06-05 PROCEDURE — 85025 COMPLETE CBC W/AUTO DIFF WBC: CPT | Mod: ,,, | Performed by: CLINICAL MEDICAL LABORATORY

## 2023-06-05 PROCEDURE — 86757 SPOTTED FEVER GROUP ANTIBODIES: ICD-10-PCS | Mod: 90,,, | Performed by: CLINICAL MEDICAL LABORATORY

## 2023-06-05 PROCEDURE — 82607 VITAMIN B12: ICD-10-PCS | Mod: ,,, | Performed by: CLINICAL MEDICAL LABORATORY

## 2023-06-05 PROCEDURE — 81001 URINALYSIS AUTO W/SCOPE: CPT | Mod: ,,, | Performed by: CLINICAL MEDICAL LABORATORY

## 2023-06-05 PROCEDURE — 86618 LYME DISEASE ANTIBODY: CPT | Mod: ,,, | Performed by: CLINICAL MEDICAL LABORATORY

## 2023-06-05 PROCEDURE — 84443 ASSAY THYROID STIM HORMONE: CPT | Mod: ,,, | Performed by: CLINICAL MEDICAL LABORATORY

## 2023-06-05 PROCEDURE — 87186 CULTURE, URINE: ICD-10-PCS | Mod: ,,, | Performed by: CLINICAL MEDICAL LABORATORY

## 2023-06-05 PROCEDURE — 86753 BABESIA MICROTI ANTIBODY PANEL: ICD-10-PCS | Mod: 90,,, | Performed by: CLINICAL MEDICAL LABORATORY

## 2023-06-05 PROCEDURE — 86666 EHRLICHIA ANTIBODY: CPT | Mod: 90,,, | Performed by: CLINICAL MEDICAL LABORATORY

## 2023-06-05 PROCEDURE — 80053 COMPREHENSIVE METABOLIC PANEL: ICD-10-PCS | Mod: ,,, | Performed by: CLINICAL MEDICAL LABORATORY

## 2023-06-05 PROCEDURE — 82746 ASSAY OF FOLIC ACID SERUM: CPT | Mod: ,,, | Performed by: CLINICAL MEDICAL LABORATORY

## 2023-06-05 PROCEDURE — 84439 T4, FREE: ICD-10-PCS | Mod: ,,, | Performed by: CLINICAL MEDICAL LABORATORY

## 2023-06-05 PROCEDURE — 82306 VITAMIN D: ICD-10-PCS | Mod: ,,, | Performed by: CLINICAL MEDICAL LABORATORY

## 2023-06-05 PROCEDURE — 86753 PROTOZOA ANTIBODY NOS: CPT | Mod: 90,,, | Performed by: CLINICAL MEDICAL LABORATORY

## 2023-06-05 PROCEDURE — 84443 TSH: ICD-10-PCS | Mod: ,,, | Performed by: CLINICAL MEDICAL LABORATORY

## 2023-06-05 PROCEDURE — 81001 URINALYSIS, REFLEX TO URINE CULTURE: ICD-10-PCS | Mod: ,,, | Performed by: CLINICAL MEDICAL LABORATORY

## 2023-06-05 PROCEDURE — 83550 IRON AND TIBC: ICD-10-PCS | Mod: ,,, | Performed by: CLINICAL MEDICAL LABORATORY

## 2023-06-05 PROCEDURE — 84439 ASSAY OF FREE THYROXINE: CPT | Mod: ,,, | Performed by: CLINICAL MEDICAL LABORATORY

## 2023-06-05 PROCEDURE — 99214 OFFICE O/P EST MOD 30 MIN: CPT | Mod: ,,, | Performed by: FAMILY MEDICINE

## 2023-06-05 PROCEDURE — 80053 COMPREHEN METABOLIC PANEL: CPT | Mod: ,,, | Performed by: CLINICAL MEDICAL LABORATORY

## 2023-06-05 PROCEDURE — 82746 FOLATE: ICD-10-PCS | Mod: ,,, | Performed by: CLINICAL MEDICAL LABORATORY

## 2023-06-05 PROCEDURE — 83550 IRON BINDING TEST: CPT | Mod: ,,, | Performed by: CLINICAL MEDICAL LABORATORY

## 2023-06-05 PROCEDURE — 86666 EHRLICHIA ANTIBODY PANEL: ICD-10-PCS | Mod: 90,,, | Performed by: CLINICAL MEDICAL LABORATORY

## 2023-06-05 PROCEDURE — 83540 ASSAY OF IRON: CPT | Mod: ,,, | Performed by: CLINICAL MEDICAL LABORATORY

## 2023-06-05 PROCEDURE — 87086 CULTURE, URINE: ICD-10-PCS | Mod: ,,, | Performed by: CLINICAL MEDICAL LABORATORY

## 2023-06-05 PROCEDURE — 82607 VITAMIN B-12: CPT | Mod: ,,, | Performed by: CLINICAL MEDICAL LABORATORY

## 2023-06-05 RX ORDER — DOXYCYCLINE 100 MG/1
100 CAPSULE ORAL 2 TIMES DAILY
Qty: 28 CAPSULE | Refills: 0 | Status: SHIPPED | OUTPATIENT
Start: 2023-06-05 | End: 2023-06-19

## 2023-06-05 NOTE — LETTER
June 5, 2023      [facility]  [address]  [place] [address]  Phone: [phone]  Fax: [phone]       Patient: Maddie Ya   YOB: 1950  Date of Visit: 06/05/2023    To Whom It May Concern:    Jacey Ya  was at [facility] on 06/05/2023. The patient may return 06/12/2023 no restrictions. If you have any questions or concerns, or if I can be of further assistance, please do not hesitate to contact me.    Sincerely,

## 2023-06-05 NOTE — PROGRESS NOTES
Clinic Note    Patient Name: Maddie Ya  : 1950  MRN: [id number]    Chief Complaint   Patient presents with    Fall     Pt states she has no balance     Fatigue     Pt states she had 2 tick bites x 3-4 weeks ago    Headache    Diarrhea    Health Maintenance     LDCT Lung Screen due on pt states she never did get appt. But wants   Mammogram due on 2023         HPI:    Ms. Maddie Ya is a 72 y.o. female who presents to clinic today with CC of fatigue, generalized weakness, headaches, diarrhea, and several recent falls.  Reports she did go to ER after fall. Reports this was covered under workman's comp as she fell at work. Reports they did XR on her ankle and CT scan of her head at time of fall with normal results. Reports L hip pain and small knot to lateral aspect of L hip since fall. Reports, however, pain is improving.  Reports she has had several tick bites recently and is uncertain if this is related to current symptoms.  Reports she follows with Dr. Meade (Cardiology) and has recently had an EKG. Reports he has carotid dopplers and vascular studies on her legs scheduled as well.   Patient is, otherwise, without complaints.     Medications:  Medication List with Changes/Refills   New Medications    DOXYCYCLINE (MONODOX) 100 MG CAPSULE    Take 1 capsule (100 mg total) by mouth 2 (two) times daily. for 14 days   Current Medications    ASPIRIN (ECOTRIN) 81 MG EC TABLET    Take 1 tablet by mouth Daily.    ATORVASTATIN (LIPITOR) 80 MG TABLET    Take 1 tablet (80 mg total) by mouth every evening.    CARVEDILOL (COREG) 6.25 MG TABLET    Take 1 tablet (6.25 mg total) by mouth 2 (two) times daily.    CETIRIZINE (ZYRTEC) 10 MG TABLET    Take 1 tablet (10 mg total) by mouth once daily.    CILOSTAZOL (PLETAL) 100 MG TAB    Take 1 tablet (100 mg total) by mouth 2 (two) times daily.    CYANOCOBALAMIN 1,000 MCG/ML INJECTION    once every 2 weeks X 2 months and then monthly for 6 months    CYCLOBENZAPRINE  (FLEXERIL) 10 MG TABLET    Take 1 tablet (10 mg total) by mouth 3 (three) times daily as needed for Muscle spasms.    ERGOCALCIFEROL (ERGOCALCIFEROL) 50,000 UNIT CAP    Take 1 capsule (50,000 Units total) by mouth every 7 days.    ESCITALOPRAM OXALATE (LEXAPRO) 20 MG TABLET    Take 1 tablet (20 mg total) by mouth once daily.    FERROUS SULFATE (FEOSOL) 325 MG (65 MG IRON) TAB TABLET    Take 1 tablet (325 mg total) by mouth daily with breakfast.    FLUTICASONE PROPIONATE (FLONASE) 50 MCG/ACTUATION NASAL SPRAY    1 spray (50 mcg total) by Each Nostril route once daily.    FUROSEMIDE (LASIX) 40 MG TABLET    Take 1 tablet (40 mg total) by mouth daily as needed (swelling).    NEOMYCIN-POLYMYXIN-DEXAMETHASONE (DEXACINE) 3.5 MG/G-10,000 UNIT/G-0.1 % OINT    Place 1 drop into both eyes every 6 (six) hours.    OMEPRAZOLE (PRILOSEC) 20 MG CAPSULE    Take 1 capsule (20 mg total) by mouth once daily.    POTASSIUM CHLORIDE (K-TAB) 20 MEQ    Take 1 tablet (20 mEq total) by mouth once daily.    PREDNISOLONE ACETATE (PRED FORTE) 1 % DRPS    Place 1 drop into both eyes 4 (four) times daily.    PROMETHAZINE (PHENERGAN) 25 MG TABLET    Take 1 tablet (25 mg total) by mouth every 6 (six) hours as needed for Nausea (may cause drowsiness).    SPIRONOLACTONE (ALDACTONE) 25 MG TABLET    Take 1 tablet (25 mg total) by mouth daily as needed (swelling).    SPIRONOLACTONE (ALDACTONE) 25 MG TABLET    1 tablet.    TRUE METRIX GLUCOSE TEST STRIP STRP    Inject 1-2 Sticks into the skin once daily at 6am.    VALSARTAN (DIOVAN) 80 MG TABLET    Take 1 tablet (80 mg total) by mouth once daily.    VIT C/E/ZN/COPPR/LUTEIN/ZEAXAN (PRESERVISION AREDS-2 ORAL)    Take by mouth.   Discontinued Medications    METFORMIN (GLUCOPHAGE-XR) 500 MG ER 24HR TABLET    Take 1 tablet (500 mg total) by mouth 2 (two) times daily with meals.        Allergies: Hydromorphone and Tuberculin, purified protein derivative      Past Medical History:    Past Medical History:    Diagnosis Date    Adenomatous polyp of ascending colon 06/21/2022    Adenomatous polyp of transverse colon 06/21/2022    B12 deficiency     Bilateral carotid bruits     CAD (coronary artery disease)     sees Halina    CHF (congestive heart failure)     CVA (cerebral vascular accident)     Depression     Diabetes mellitus, type 2     Diverticula, colon 06/21/2022    GERD (gastroesophageal reflux disease)     Heart attack     History of colon polyps     Hyperlipidemia     Hypertension     Iron deficiency anemia due to chronic blood loss 06/21/2022    Ischemic cardiomyopathy     Lumbar disc disease     OA (osteoarthritis)     PAD (peripheral artery disease)        Past Surgical History:    Past Surgical History:   Procedure Laterality Date    ANKLE SURGERY      APPENDECTOMY      BACK SURGERY      CARDIAC CATHETERIZATION      CHOLECYSTECTOMY      COLONOSCOPY  06/23/2022    hx colon polyps    CORNEAL TRANSPLANT Bilateral     CORONARY ANGIOPLASTY WITH STENT PLACEMENT      HYSTERECTOMY      TONSILLECTOMY      TOTAL ABDOMINAL HYSTERECTOMY W/ BILATERAL SALPINGOOPHORECTOMY           Social History:    Social History     Tobacco Use   Smoking Status Every Day    Packs/day: 0.50    Years: 50.00    Pack years: 25.00    Types: Cigarettes    Start date: 1973   Smokeless Tobacco Never   Tobacco Comments    Off and on smoker approximately 50 years      Social History     Substance and Sexual Activity   Alcohol Use Never     Social History     Substance and Sexual Activity   Drug Use Never         Family History:    Family History   Problem Relation Age of Onset    Osteoarthritis Mother     Diabetes Mother     Heart failure Mother     Hyperlipidemia Mother     Hypertension Mother     Cancer Father     Diabetes Father     Hypertension Father     Heart attack Father     Lung cancer Father     Cancer Brother     Multiple myeloma Brother        Review of Systems:    Review of Systems   Constitutional:  Positive for fever. Negative for  "appetite change, chills, fatigue and unexpected weight change.   Eyes:  Positive for visual disturbance.   Respiratory:  Negative for cough and shortness of breath.    Cardiovascular:  Negative for chest pain.        Reports occasional swelling in legs   Gastrointestinal:  Positive for diarrhea and nausea. Negative for change in bowel habit, constipation, vomiting and change in bowel habit.        Attributes diarrhea to metformin  Reports intermittent lower abdominal pain   Genitourinary:  Negative for dysuria and frequency.   Musculoskeletal:  Positive for arthralgias.   Integumentary:  Negative for rash.   Neurological:  Positive for headaches. Negative for dizziness.   Psychiatric/Behavioral:  The patient is not nervous/anxious.       Vitals:    Vitals:    06/05/23 0836   BP: 136/80   BP Location: Left arm   Patient Position: Sitting   BP Method: Large (Automatic)   Pulse: 69   Resp: 18   Temp: 98.8 °F (37.1 °C)   TempSrc: Oral   SpO2: 97%   Weight: 78.7 kg (173 lb 9.6 oz)   Height: 5' 6" (1.676 m)       Body mass index is 28.02 kg/m².    Wt Readings from Last 3 Encounters:   06/05/23 0836 78.7 kg (173 lb 9.6 oz)   04/12/23 1000 80.5 kg (177 lb 8 oz)   01/31/23 0810 78.9 kg (174 lb)        Physical Exam:    Physical Exam  Constitutional:       General: She is not in acute distress.     Appearance: Normal appearance.   HENT:      Nose: Nose normal.      Mouth/Throat:      Mouth: Mucous membranes are moist.      Pharynx: Oropharynx is clear.   Eyes:      Conjunctiva/sclera: Conjunctivae normal.   Cardiovascular:      Rate and Rhythm: Normal rate and regular rhythm.      Heart sounds: Normal heart sounds. No murmur heard.  Pulmonary:      Effort: Pulmonary effort is normal. No respiratory distress.      Breath sounds: Normal breath sounds. No wheezing, rhonchi or rales.   Abdominal:      General: Bowel sounds are normal.      Palpations: Abdomen is soft.      Tenderness: There is no abdominal tenderness. "   Musculoskeletal:         General: Tenderness present. No swelling. Normal range of motion.      Cervical back: Neck supple.      Right lower leg: No edema.      Left lower leg: No edema.      Comments: + mild TTP to L hip   Skin:     Findings: No rash.   Neurological:      General: No focal deficit present.      Mental Status: She is alert. Mental status is at baseline.   Psychiatric:         Mood and Affect: Mood normal.         Assessment/Plan:   1. Other fatigue  -     CBC Auto Differential; Future; Expected date: 06/05/2023  -     Comprehensive Metabolic Panel; Future; Expected date: 06/05/2023  -     TSH; Future; Expected date: 06/05/2023  -     T4, Free; Future; Expected date: 06/05/2023  -     Vitamin B12; Future; Expected date: 06/05/2023  -     Vitamin D; Future; Expected date: 06/05/2023  -     Folate; Future; Expected date: 06/05/2023  -     Cancel: Iron and TIBC  -     Ferritin    2. Tick bite, unspecified site, initial encounter  -     Spotted Fever Group Antibodies; Future; Expected date: 06/05/2023  -     Lyme Antibody w/ Immunoblot Reflex; Future; Expected date: 06/05/2023  -     Babesia Microti Antibody Panel; Future; Expected date: 06/05/2023  -     Ehrlichia Antibody Panel; Future; Expected date: 06/05/2023  -     doxycycline (MONODOX) 100 MG capsule; Take 1 capsule (100 mg total) by mouth 2 (two) times daily. for 14 days  Dispense: 28 capsule; Refill: 0    3. Generalized weakness  -     CBC Auto Differential; Future; Expected date: 06/05/2023  -     Comprehensive Metabolic Panel; Future; Expected date: 06/05/2023  -     TSH; Future; Expected date: 06/05/2023  -     T4, Free; Future; Expected date: 06/05/2023  -     Vitamin B12; Future; Expected date: 06/05/2023  -     Vitamin D; Future; Expected date: 06/05/2023  -     Folate; Future; Expected date: 06/05/2023  -     Cancel: Iron and TIBC  -     Ferritin    4. Encounter for screening for malignant neoplasm of lung in former smoker who quit in  past 15 years with 30 pack year history or greater  -     CT Chest Lung Screening Low Dose; Future; Expected date: 06/05/2023; has had initial screening but reports she is due for annual follow up.    5. Other long term (current) drug therapy  -     Vitamin B12; Future; Expected date: 06/05/2023  -     Vitamin D; Future; Expected date: 06/05/2023  -     Folate; Future; Expected date: 06/05/2023  -     Cancel: Iron and TIBC  -     Ferritin    6. Screening mammogram for breast cancer  -     Mammo Digital Screening Bilat; Future; Expected date: 06/05/2023    7. Diabetes mellitus due to underlying condition with chronic kidney disease, without long-term current use of insulin, unspecified CKD stage  -     iron and TIBC  -     Ferritin  - D/c metformin to see if this helps with GI issues. Monitor blood glucose levels closely at home.    8. Lower abdominal pain  -     Urinalysis, Reflex to Urine Culture; Future; Expected date: 06/05/2023    9. Left hip pain  - Patient refused imaging. Reports she follow follow up with workman's comp if fails to resolve.       Active Problem List with Overview Notes    Diagnosis Date Noted    Mild chronic obstructive pulmonary disease 05/16/2022    Atherosclerotic heart disease of native coronary artery with other forms of angina pectoris     CHF (congestive heart failure)     History of CVA (cerebrovascular accident)     Ischemic cardiomyopathy     Essential hypertension 08/03/2021    Type 2 diabetes mellitus without complication, without long-term current use of insulin 08/03/2021    Depression     Bilateral carotid bruits     Mixed hyperlipidemia 08/03/2021    Hypokalemia 04/12/2023    BMI 28.0-28.9,adult 01/31/2023    Iron deficiency anemia due to chronic blood loss 06/21/2022    Peripheral vascular disease 05/16/2022    OA (osteoarthritis)     Lumbar disc disease     Gastroesophageal reflux disease without esophagitis 08/03/2021    B12 deficiency     History of colon polyps      Actinic keratoses 03/07/2019    History of degenerative disc disease 03/07/2019    Diverticula, colon 06/21/2022    Muscle spasm 04/12/2023        RTC in 2 weeks for follow up on weakness, fatigue, nausea, diarrhea  RTC sooner if symptoms worsen or fail to resolve.  Patient voiced understanding and is agreeable to plan.      Dinora Montero MD    Family Medicine

## 2023-06-06 LAB — VIT B12 SERPL-MCNC: 296 PG/ML (ref 193–986)

## 2023-06-07 DIAGNOSIS — E53.8 B12 DEFICIENCY: Chronic | ICD-10-CM

## 2023-06-07 LAB
A PHAGOCYTOPH IGG TITR SER IF: NORMAL TITER
B BURGDOR IGG SER QL IB: NORMAL
B MICROTI IGG TITR SER: NORMAL TITER
E CHAFFEENSIS IGG TITR SER IF: NORMAL TITER
UA COMPLETE W REFLEX CULTURE PNL UR: ABNORMAL

## 2023-06-07 RX ORDER — CYANOCOBALAMIN 1000 UG/ML
INJECTION, SOLUTION INTRAMUSCULAR; SUBCUTANEOUS
Qty: 1 ML | Refills: 6 | Status: SHIPPED | OUTPATIENT
Start: 2023-06-07 | End: 2023-12-11 | Stop reason: SDUPTHER

## 2023-06-07 RX ORDER — SULFAMETHOXAZOLE AND TRIMETHOPRIM 800; 160 MG/1; MG/1
1 TABLET ORAL 2 TIMES DAILY
Qty: 20 TABLET | Refills: 0 | Status: SHIPPED | OUTPATIENT
Start: 2023-06-07 | End: 2023-12-07

## 2023-06-07 NOTE — TELEPHONE ENCOUNTER
----- Message from Adriana Montero MD sent at 6/7/2023 12:06 PM CDT -----  Please call patient regarding lab results. Patient has a UTI. This could be part of the reason she has not been feeling well. Recommend bactrim DS PO BID X 10 days. Ok to stop doxycycline for now. If antibiotics typically give her a yeast infection ok to also send 3 diflucan tablets if needed for yeast infection after completion of antibiotics. Vitamin B12 is low. Patient would likely benefit from Vitamin B12 injections once monthly X 6 months. Patient can come in for a nurse only visit monthly or we can send medication to pharmacy and patient can give injections at home.   Vitamin D levels are much improved.   Tick panel results are pending.  Labs, otherwise, ok/stable. Thanks!    1305- call made to pt regarding above message; pt voiced understanding and stated she is able to take monthly injection of b12 at home as she has done it before. Pt verified pharmacy as [facility] in [place], [place]. Informed pt to call back after completion of antibiotics if diflucan 3 tablets need to be called in for yeast infection. Pt voiced understanding.

## 2023-06-08 LAB
RICK SF IGG TITR SER IF: NORMAL {TITER}
RICK SF IGM TITR SER IF: NORMAL {TITER}

## 2023-06-19 ENCOUNTER — OFFICE VISIT (OUTPATIENT)
Dept: FAMILY MEDICINE | Facility: CLINIC | Age: 73
End: 2023-06-19
Payer: MEDICARE

## 2023-06-19 VITALS
HEIGHT: 66 IN | WEIGHT: 168.81 LBS | OXYGEN SATURATION: 96 % | HEART RATE: 70 BPM | BODY MASS INDEX: 27.13 KG/M2 | DIASTOLIC BLOOD PRESSURE: 75 MMHG | TEMPERATURE: 99 F | RESPIRATION RATE: 18 BRPM | SYSTOLIC BLOOD PRESSURE: 117 MMHG

## 2023-06-19 DIAGNOSIS — R53.83 OTHER FATIGUE: ICD-10-CM

## 2023-06-19 DIAGNOSIS — E53.8 B12 DEFICIENCY: ICD-10-CM

## 2023-06-19 DIAGNOSIS — E11.9 TYPE 2 DIABETES MELLITUS WITHOUT COMPLICATION, WITHOUT LONG-TERM CURRENT USE OF INSULIN: Primary | Chronic | ICD-10-CM

## 2023-06-19 DIAGNOSIS — Z12.31 SCREENING MAMMOGRAM FOR BREAST CANCER: ICD-10-CM

## 2023-06-19 DIAGNOSIS — R19.7 DIARRHEA, UNSPECIFIED TYPE: ICD-10-CM

## 2023-06-19 PROCEDURE — 99213 OFFICE O/P EST LOW 20 MIN: CPT | Mod: ,,, | Performed by: FAMILY MEDICINE

## 2023-06-19 PROCEDURE — 99213 PR OFFICE/OUTPT VISIT, EST, LEVL III, 20-29 MIN: ICD-10-PCS | Mod: ,,, | Performed by: FAMILY MEDICINE

## 2023-06-19 RX ORDER — GLIPIZIDE 2.5 MG/1
5 TABLET, EXTENDED RELEASE ORAL
Qty: 90 TABLET | Refills: 1 | Status: SHIPPED | OUTPATIENT
Start: 2023-06-19 | End: 2023-12-07 | Stop reason: SDUPTHER

## 2023-06-19 NOTE — PROGRESS NOTES
Clinic Note    Patient Name: Maddie Ya  : 1950  MRN: [id number]    Chief Complaint   Patient presents with    Follow-up     2 week follow up        HPI:    Ms. Maddie Ya is a 72 y.o. female who presents to clinic today with CC of follow up. She was seen a few weeks ago for fatigue and just not feeling well with some associated fever. She was found to have a UTI and was treated. Reports she is feeling improved today.  She was also having a lot of diarrhea at previous visit. Metformin was discontinued and diarrhea is much improved.  Reports she feels better. Reports one elevated blood glucose reading in the 300s after stopping metformin but reports this was after going on for a family birthday party and eating some things that she shouldn't eat. Reports, however, remaining readings have been diet controlled.   Patient is, otherwise, without complaints.     Medications:  Medication List with Changes/Refills   New Medications    GLIPIZIDE (GLUCOTROL) 2.5 MG TR24    Take 2 tablets (5 mg total) by mouth daily with breakfast.   Current Medications    ASPIRIN (ECOTRIN) 81 MG EC TABLET    Take 1 tablet by mouth Daily.    ATORVASTATIN (LIPITOR) 80 MG TABLET    Take 1 tablet (80 mg total) by mouth every evening.    CARVEDILOL (COREG) 6.25 MG TABLET    Take 1 tablet (6.25 mg total) by mouth 2 (two) times daily.    CETIRIZINE (ZYRTEC) 10 MG TABLET    Take 1 tablet (10 mg total) by mouth once daily.    CILOSTAZOL (PLETAL) 100 MG TAB    Take 1 tablet (100 mg total) by mouth 2 (two) times daily.    CYANOCOBALAMIN 1,000 MCG/ML INJECTION    Once a month for 6 months.    CYCLOBENZAPRINE (FLEXERIL) 10 MG TABLET    Take 1 tablet (10 mg total) by mouth 3 (three) times daily as needed for Muscle spasms.    DOXYCYCLINE (MONODOX) 100 MG CAPSULE    Take 1 capsule (100 mg total) by mouth 2 (two) times daily. for 14 days    ERGOCALCIFEROL (ERGOCALCIFEROL) 50,000 UNIT CAP    Take 1 capsule (50,000 Units total) by mouth every 7  days.    ESCITALOPRAM OXALATE (LEXAPRO) 20 MG TABLET    Take 1 tablet (20 mg total) by mouth once daily.    FERROUS SULFATE (FEOSOL) 325 MG (65 MG IRON) TAB TABLET    Take 1 tablet (325 mg total) by mouth daily with breakfast.    FLUTICASONE PROPIONATE (FLONASE) 50 MCG/ACTUATION NASAL SPRAY    1 spray (50 mcg total) by Each Nostril route once daily.    FUROSEMIDE (LASIX) 40 MG TABLET    Take 1 tablet (40 mg total) by mouth daily as needed (swelling).    NEOMYCIN-POLYMYXIN-DEXAMETHASONE (DEXACINE) 3.5 MG/G-10,000 UNIT/G-0.1 % OINT    Place 1 drop into both eyes every 6 (six) hours.    OMEPRAZOLE (PRILOSEC) 20 MG CAPSULE    Take 1 capsule (20 mg total) by mouth once daily.    POTASSIUM CHLORIDE (K-TAB) 20 MEQ    Take 1 tablet (20 mEq total) by mouth once daily.    PREDNISOLONE ACETATE (PRED FORTE) 1 % DRPS    Place 1 drop into both eyes 4 (four) times daily.    PROMETHAZINE (PHENERGAN) 25 MG TABLET    Take 1 tablet (25 mg total) by mouth every 6 (six) hours as needed for Nausea (may cause drowsiness).    SPIRONOLACTONE (ALDACTONE) 25 MG TABLET    Take 1 tablet (25 mg total) by mouth daily as needed (swelling).    SPIRONOLACTONE (ALDACTONE) 25 MG TABLET    1 tablet.    SULFAMETHOXAZOLE-TRIMETHOPRIM 800-160MG (BACTRIM DS) 800-160 MG TAB    Take 1 tablet by mouth 2 (two) times daily.    TRUE METRIX GLUCOSE TEST STRIP STRP    Inject 1-2 Sticks into the skin once daily at 6am.    VALSARTAN (DIOVAN) 80 MG TABLET    Take 1 tablet (80 mg total) by mouth once daily.    VIT C/E/ZN/COPPR/LUTEIN/ZEAXAN (PRESERVISION AREDS-2 ORAL)    Take by mouth.        Allergies: Hydromorphone and Tuberculin, purified protein derivative      Past Medical History:    Past Medical History:   Diagnosis Date    Adenomatous polyp of ascending colon 06/21/2022    Adenomatous polyp of transverse colon 06/21/2022    B12 deficiency     Bilateral carotid bruits     CAD (coronary artery disease)     sees Halina    CHF (congestive heart failure)     CVA  (cerebral vascular accident)     Depression     Diabetes mellitus, type 2     Diverticula, colon 06/21/2022    GERD (gastroesophageal reflux disease)     Heart attack     History of colon polyps     Hyperlipidemia     Hypertension     Iron deficiency anemia due to chronic blood loss 06/21/2022    Ischemic cardiomyopathy     Lumbar disc disease     OA (osteoarthritis)     PAD (peripheral artery disease)        Past Surgical History:    Past Surgical History:   Procedure Laterality Date    ANKLE SURGERY      APPENDECTOMY      BACK SURGERY      CARDIAC CATHETERIZATION      CHOLECYSTECTOMY      COLONOSCOPY  06/23/2022    hx colon polyps    CORNEAL TRANSPLANT Bilateral     CORONARY ANGIOPLASTY WITH STENT PLACEMENT      HYSTERECTOMY      TONSILLECTOMY      TOTAL ABDOMINAL HYSTERECTOMY W/ BILATERAL SALPINGOOPHORECTOMY           Social History:    Social History     Tobacco Use   Smoking Status Every Day    Packs/day: 0.50    Years: 50.00    Pack years: 25.00    Types: Cigarettes    Start date: 1973   Smokeless Tobacco Never   Tobacco Comments    Off and on smoker approximately 50 years      Social History     Substance and Sexual Activity   Alcohol Use Never     Social History     Substance and Sexual Activity   Drug Use Never         Family History:    Family History   Problem Relation Age of Onset    Osteoarthritis Mother     Diabetes Mother     Heart failure Mother     Hyperlipidemia Mother     Hypertension Mother     Cancer Father     Diabetes Father     Hypertension Father     Heart attack Father     Lung cancer Father     Cancer Brother     Multiple myeloma Brother        Review of Systems:    Review of Systems   Constitutional:  Positive for fatigue. Negative for appetite change, chills, fever and unexpected weight change.   Eyes:  Negative for visual disturbance.   Respiratory:  Negative for cough and shortness of breath.    Cardiovascular:  Negative for chest pain.        Reports intermittent issues with swelling  "in her legs - currently much improved   Gastrointestinal:  Negative for abdominal pain, change in bowel habit, constipation, nausea, vomiting and change in bowel habit.        Reports diarrhea is much improved since stopping metformin   Musculoskeletal:  Negative for arthralgias.   Integumentary:  Negative for rash.   Neurological:  Negative for dizziness.        Reports occasional headaches   Psychiatric/Behavioral:  The patient is not nervous/anxious.       Vitals:    Vitals:    06/19/23 0923   BP: 117/75   BP Location: Left arm   Patient Position: Sitting   BP Method: Medium (Manual)   Pulse: 70   Resp: 18   Temp: 99.2 °F (37.3 °C)   TempSrc: Oral   SpO2: 96%   Weight: 76.6 kg (168 lb 12.8 oz)   Height: 5' 6" (1.676 m)       Body mass index is 27.25 kg/m².    Wt Readings from Last 3 Encounters:   06/19/23 0923 76.6 kg (168 lb 12.8 oz)   06/05/23 0836 78.7 kg (173 lb 9.6 oz)   04/12/23 1000 80.5 kg (177 lb 8 oz)        Physical Exam:    Physical Exam  Constitutional:       General: She is not in acute distress.     Appearance: Normal appearance.   HENT:      Nose: Nose normal.      Mouth/Throat:      Mouth: Mucous membranes are moist.      Pharynx: Oropharynx is clear.   Eyes:      Conjunctiva/sclera: Conjunctivae normal.   Cardiovascular:      Rate and Rhythm: Normal rate and regular rhythm.      Heart sounds: Normal heart sounds. No murmur heard.  Pulmonary:      Effort: Pulmonary effort is normal. No respiratory distress.      Breath sounds: Normal breath sounds. No wheezing, rhonchi or rales.   Abdominal:      General: Bowel sounds are normal.      Palpations: Abdomen is soft.      Tenderness: There is no abdominal tenderness.   Musculoskeletal:      Cervical back: Neck supple.      Right lower leg: No edema.      Left lower leg: No edema.   Skin:     Findings: No rash.   Neurological:      General: No focal deficit present.      Mental Status: She is alert. Mental status is at baseline.   Psychiatric:         " Mood and Affect: Mood normal.       Assessment/Plan:   1. Type 2 diabetes mellitus without complication, without long-term current use of insulin  -     glipiZIDE (GLUCOTROL) 2.5 MG TR24; Take 2 tablets (5 mg total) by mouth daily with breakfast.  Dispense: 90 tablet; Refill: 1- new medication. Risks/benefits/potential side effects/black box warning reviewed and discussed with patient.     2. Screening mammogram for breast cancer  -     Mammo Digital Screening Bilat; Future; Expected date: 06/19/2023    3. Other fatigue        - Somewhat improved. Continue B12 supplementation.    4. B12 deficiency  The current medical regimen is effective;  continue present plan and medications.    5. Diarrhea, unspecified type  - Improved. Stay off of metformin. Also recommended OTC probiotics.        Active Problem List with Overview Notes    Diagnosis Date Noted    Mild chronic obstructive pulmonary disease 05/16/2022    Atherosclerotic heart disease of native coronary artery with other forms of angina pectoris     CHF (congestive heart failure)     History of CVA (cerebrovascular accident)     Ischemic cardiomyopathy     Essential hypertension 08/03/2021    Type 2 diabetes mellitus without complication, without long-term current use of insulin 08/03/2021    Depression     Bilateral carotid bruits     Mixed hyperlipidemia 08/03/2021    Hypokalemia 04/12/2023    Muscle spasm 04/12/2023    BMI 28.0-28.9,adult 01/31/2023    Iron deficiency anemia due to chronic blood loss 06/21/2022    Peripheral vascular disease 05/16/2022    OA (osteoarthritis)     Lumbar disc disease     Gastroesophageal reflux disease without esophagitis 08/03/2021    B12 deficiency     History of colon polyps     Actinic keratoses 03/07/2019    History of degenerative disc disease 03/07/2019    Diverticula, colon 06/21/2022      RTC as scheduled for chronic follow up. RTC sooner if needed.  Patient voiced understanding and is agreeable to plan.      Dinora Kothari  MD [place]    [facility] Medicine

## 2023-06-20 ENCOUNTER — TELEPHONE (OUTPATIENT)
Dept: FAMILY MEDICINE | Facility: CLINIC | Age: 73
End: 2023-06-20
Payer: MEDICARE

## 2023-06-20 NOTE — TELEPHONE ENCOUNTER
----- Message from Adriana Montero MD sent at 6/15/2023  4:45 PM CDT -----  Please call patient to let her know her tick panel was negative/normal. Other labs previously reviewed and discussed with patient. Thanks!    1220- call made to pt; pt voiced understanding.

## 2023-06-22 ENCOUNTER — TELEPHONE (OUTPATIENT)
Dept: FAMILY MEDICINE | Facility: CLINIC | Age: 73
End: 2023-06-22
Payer: MEDICARE

## 2023-06-22 ENCOUNTER — HOSPITAL ENCOUNTER (OUTPATIENT)
Dept: RADIOLOGY | Facility: HOSPITAL | Age: 73
Discharge: HOME | End: 2023-06-22
Attending: FAMILY MEDICINE
Payer: MEDICARE

## 2023-06-22 VITALS — HEIGHT: 66 IN | WEIGHT: 168 LBS | BODY MASS INDEX: 27 KG/M2

## 2023-06-22 DIAGNOSIS — Z12.2 ENCOUNTER FOR SCREENING FOR MALIGNANT NEOPLASM OF LUNG IN FORMER SMOKER WHO QUIT IN PAST 15 YEARS WITH 30 PACK YEAR HISTORY OR GREATER: ICD-10-CM

## 2023-06-22 DIAGNOSIS — Z87.891 ENCOUNTER FOR SCREENING FOR MALIGNANT NEOPLASM OF LUNG IN FORMER SMOKER WHO QUIT IN PAST 15 YEARS WITH 30 PACK YEAR HISTORY OR GREATER: ICD-10-CM

## 2023-06-22 PROCEDURE — 71271 CT CHEST LUNG SCREENING LOW DOSE: ICD-10-PCS | Mod: 26,,, | Performed by: STUDENT IN AN ORGANIZED HEALTH CARE EDUCATION/TRAINING PROGRAM

## 2023-06-22 PROCEDURE — 71271 CT THORAX LUNG CANCER SCR C-: CPT | Mod: 26,,, | Performed by: STUDENT IN AN ORGANIZED HEALTH CARE EDUCATION/TRAINING PROGRAM

## 2023-06-22 PROCEDURE — 71271 CT THORAX LUNG CANCER SCR C-: CPT | Mod: TC

## 2023-06-22 NOTE — TELEPHONE ENCOUNTER
----- Message from Adriana Monteor MD sent at 6/22/2023 12:51 PM CDT -----  Please call patient to let her know her recent low dose CT screening for lung cancer is negative/normal. Repeat in 1 year. Thanks!      1313- call made to pt; pt voiced understanding.

## 2023-07-28 LAB — BCS RECOMMENDATION EXT: NORMAL

## 2023-08-07 ENCOUNTER — OFFICE VISIT (OUTPATIENT)
Dept: FAMILY MEDICINE | Facility: CLINIC | Age: 73
End: 2023-08-07
Payer: MEDICARE

## 2023-08-07 VITALS
WEIGHT: 174.63 LBS | OXYGEN SATURATION: 96 % | RESPIRATION RATE: 18 BRPM | HEIGHT: 66 IN | TEMPERATURE: 99 F | DIASTOLIC BLOOD PRESSURE: 70 MMHG | BODY MASS INDEX: 28.06 KG/M2 | HEART RATE: 74 BPM | SYSTOLIC BLOOD PRESSURE: 138 MMHG

## 2023-08-07 DIAGNOSIS — E78.2 MIXED HYPERLIPIDEMIA: ICD-10-CM

## 2023-08-07 DIAGNOSIS — I10 ESSENTIAL HYPERTENSION: ICD-10-CM

## 2023-08-07 DIAGNOSIS — E11.9 TYPE 2 DIABETES MELLITUS WITHOUT COMPLICATION, WITHOUT LONG-TERM CURRENT USE OF INSULIN: Primary | ICD-10-CM

## 2023-08-07 DIAGNOSIS — R19.7 DIARRHEA, UNSPECIFIED TYPE: ICD-10-CM

## 2023-08-07 LAB
ALBUMIN SERPL BCP-MCNC: 3.4 G/DL (ref 3.5–5)
ALBUMIN/GLOB SERPL: 1 {RATIO}
ALP SERPL-CCNC: 78 U/L (ref 55–142)
ALT SERPL W P-5'-P-CCNC: 29 U/L (ref 13–56)
ANION GAP SERPL CALCULATED.3IONS-SCNC: 9 MMOL/L (ref 7–16)
AST SERPL W P-5'-P-CCNC: 24 U/L (ref 15–37)
BASOPHILS # BLD AUTO: 0.04 K/UL (ref 0–0.2)
BASOPHILS NFR BLD AUTO: 0.5 % (ref 0–1)
BILIRUB SERPL-MCNC: 0.6 MG/DL (ref ?–1.2)
BUN SERPL-MCNC: 9 MG/DL (ref 7–18)
BUN/CREAT SERPL: 11 (ref 6–20)
CALCIUM SERPL-MCNC: 9.1 MG/DL (ref 8.5–10.1)
CHLORIDE SERPL-SCNC: 106 MMOL/L (ref 98–107)
CHOLEST SERPL-MCNC: 134 MG/DL (ref 0–200)
CHOLEST/HDLC SERPL: 1.8 {RATIO}
CO2 SERPL-SCNC: 28 MMOL/L (ref 21–32)
CREAT SERPL-MCNC: 0.81 MG/DL (ref 0.55–1.02)
CREAT UR-MCNC: 50 MG/DL (ref 28–219)
DIFFERENTIAL METHOD BLD: ABNORMAL
EGFR (NO RACE VARIABLE) (RUSH/TITUS): 77 ML/MIN/1.73M2
EOSINOPHIL # BLD AUTO: 0.16 K/UL (ref 0–0.5)
EOSINOPHIL NFR BLD AUTO: 2 % (ref 1–4)
ERYTHROCYTE [DISTWIDTH] IN BLOOD BY AUTOMATED COUNT: 14.6 % (ref 11.5–14.5)
EST. AVERAGE GLUCOSE BLD GHB EST-MCNC: 137 MG/DL
GLOBULIN SER-MCNC: 3.4 G/DL (ref 2–4)
GLUCOSE SERPL-MCNC: 111 MG/DL (ref 74–106)
HBA1C MFR BLD HPLC: 6.7 % (ref 4.5–6.6)
HCT VFR BLD AUTO: 41.3 % (ref 38–47)
HDLC SERPL-MCNC: 76 MG/DL (ref 40–60)
HGB BLD-MCNC: 13.2 G/DL (ref 12–16)
IMM GRANULOCYTES # BLD AUTO: 0.02 K/UL (ref 0–0.04)
IMM GRANULOCYTES NFR BLD: 0.3 % (ref 0–0.4)
LDLC SERPL CALC-MCNC: 35 MG/DL
LYMPHOCYTES # BLD AUTO: 2.64 K/UL (ref 1–4.8)
LYMPHOCYTES NFR BLD AUTO: 33.5 % (ref 27–41)
MCH RBC QN AUTO: 30.7 PG (ref 27–31)
MCHC RBC AUTO-ENTMCNC: 32 G/DL (ref 32–36)
MCV RBC AUTO: 96 FL (ref 80–96)
MICROALBUMIN UR-MCNC: <0.5 MG/DL (ref 0–2.8)
MICROALBUMIN/CREAT RATIO PNL UR: <10 MG/G (ref 0–30)
MONOCYTES # BLD AUTO: 0.64 K/UL (ref 0–0.8)
MONOCYTES NFR BLD AUTO: 8.1 % (ref 2–6)
MPC BLD CALC-MCNC: 10 FL (ref 9.4–12.4)
NEUTROPHILS # BLD AUTO: 4.38 K/UL (ref 1.8–7.7)
NEUTROPHILS NFR BLD AUTO: 55.6 % (ref 53–65)
NONHDLC SERPL-MCNC: 58 MG/DL
NRBC # BLD AUTO: 0 X10E3/UL
NRBC, AUTO (.00): 0 %
PLATELET # BLD AUTO: 199 K/UL (ref 150–400)
POTASSIUM SERPL-SCNC: 4 MMOL/L (ref 3.5–5.1)
PROT SERPL-MCNC: 6.8 G/DL (ref 6.4–8.2)
RBC # BLD AUTO: 4.3 M/UL (ref 4.2–5.4)
SODIUM SERPL-SCNC: 139 MMOL/L (ref 136–145)
TRIGL SERPL-MCNC: 114 MG/DL (ref 35–150)
VLDLC SERPL-MCNC: 23 MG/DL
WBC # BLD AUTO: 7.88 K/UL (ref 4.5–11)

## 2023-08-07 PROCEDURE — 99213 PR OFFICE/OUTPT VISIT, EST, LEVL III, 20-29 MIN: ICD-10-PCS | Mod: ,,, | Performed by: FAMILY MEDICINE

## 2023-08-07 PROCEDURE — 82043 UR ALBUMIN QUANTITATIVE: CPT | Mod: ,,, | Performed by: CLINICAL MEDICAL LABORATORY

## 2023-08-07 PROCEDURE — 83036 HEMOGLOBIN A1C: ICD-10-PCS | Mod: ,,, | Performed by: CLINICAL MEDICAL LABORATORY

## 2023-08-07 PROCEDURE — 80061 LIPID PANEL: CPT | Mod: ,,, | Performed by: CLINICAL MEDICAL LABORATORY

## 2023-08-07 PROCEDURE — 80053 COMPREHENSIVE METABOLIC PANEL: ICD-10-PCS | Mod: ,,, | Performed by: CLINICAL MEDICAL LABORATORY

## 2023-08-07 PROCEDURE — 80061 LIPID PANEL: ICD-10-PCS | Mod: ,,, | Performed by: CLINICAL MEDICAL LABORATORY

## 2023-08-07 PROCEDURE — 82570 ASSAY OF URINE CREATININE: CPT | Mod: ,,, | Performed by: CLINICAL MEDICAL LABORATORY

## 2023-08-07 PROCEDURE — 82043 MICROALBUMIN / CREATININE RATIO URINE: ICD-10-PCS | Mod: ,,, | Performed by: CLINICAL MEDICAL LABORATORY

## 2023-08-07 PROCEDURE — 83036 HEMOGLOBIN GLYCOSYLATED A1C: CPT | Mod: ,,, | Performed by: CLINICAL MEDICAL LABORATORY

## 2023-08-07 PROCEDURE — 80053 COMPREHEN METABOLIC PANEL: CPT | Mod: ,,, | Performed by: CLINICAL MEDICAL LABORATORY

## 2023-08-07 PROCEDURE — 82570 MICROALBUMIN / CREATININE RATIO URINE: ICD-10-PCS | Mod: ,,, | Performed by: CLINICAL MEDICAL LABORATORY

## 2023-08-07 PROCEDURE — 85025 COMPLETE CBC W/AUTO DIFF WBC: CPT | Mod: ,,, | Performed by: CLINICAL MEDICAL LABORATORY

## 2023-08-07 PROCEDURE — 99213 OFFICE O/P EST LOW 20 MIN: CPT | Mod: ,,, | Performed by: FAMILY MEDICINE

## 2023-08-07 PROCEDURE — 85025 CBC WITH DIFFERENTIAL: ICD-10-PCS | Mod: ,,, | Performed by: CLINICAL MEDICAL LABORATORY

## 2023-08-07 NOTE — PROGRESS NOTES
Clinic Note    Patient Name: Maddie Ya  : 1950  MRN: [id number]    Chief Complaint   Patient presents with    Follow-up     4 month        HPI:    Ms. Maddie Ya is a 72 y.o. female who presents to clinic today with CC of follow up on chronic disease processes including Type II DM, HTN, HLD, cardiomyopathy, CHF, h/o CVA, depression, COPD, PVD, iron deficiency anemia, GERD, OA, and vitamin B12 deficiency.  Reports she has had diarrhea X several months. Reports she has loose stools daily (3-4 per day). Denies abdominal pain. Denies vomiting. Reports occasional nausea. Last colonoscopy was in  with Dr. Alcala.   Patient reports chronic issues are, otherwise, well controlled on current medication regimen.  Denies problems or side effects with medications.  Patient is, otherwise, without complaints.     Medications:  Medication List with Changes/Refills   Current Medications    ASPIRIN (ECOTRIN) 81 MG EC TABLET    Take 1 tablet by mouth Daily.    ATORVASTATIN (LIPITOR) 80 MG TABLET    Take 1 tablet (80 mg total) by mouth every evening.    CARVEDILOL (COREG) 6.25 MG TABLET    Take 1 tablet (6.25 mg total) by mouth 2 (two) times daily.    CETIRIZINE (ZYRTEC) 10 MG TABLET    Take 1 tablet (10 mg total) by mouth once daily.    CILOSTAZOL (PLETAL) 100 MG TAB    Take 1 tablet (100 mg total) by mouth 2 (two) times daily.    CYANOCOBALAMIN 1,000 MCG/ML INJECTION    Once a month for 6 months.    CYCLOBENZAPRINE (FLEXERIL) 10 MG TABLET    Take 1 tablet (10 mg total) by mouth 3 (three) times daily as needed for Muscle spasms.    ERGOCALCIFEROL (ERGOCALCIFEROL) 50,000 UNIT CAP    Take 1 capsule (50,000 Units total) by mouth every 7 days.    ESCITALOPRAM OXALATE (LEXAPRO) 20 MG TABLET    Take 1 tablet (20 mg total) by mouth once daily.    FERROUS SULFATE (FEOSOL) 325 MG (65 MG IRON) TAB TABLET    Take 1 tablet (325 mg total) by mouth daily with breakfast.    FLUTICASONE PROPIONATE (FLONASE) 50 MCG/ACTUATION NASAL  SPRAY    1 spray (50 mcg total) by Each Nostril route once daily.    FUROSEMIDE (LASIX) 40 MG TABLET    Take 1 tablet (40 mg total) by mouth daily as needed (swelling).    GLIPIZIDE (GLUCOTROL) 2.5 MG TR24    Take 2 tablets (5 mg total) by mouth daily with breakfast.    NEOMYCIN-POLYMYXIN-DEXAMETHASONE (DEXACINE) 3.5 MG/G-10,000 UNIT/G-0.1 % OINT    Place 1 drop into both eyes every 6 (six) hours.    OMEPRAZOLE (PRILOSEC) 20 MG CAPSULE    Take 1 capsule (20 mg total) by mouth once daily.    POTASSIUM CHLORIDE (K-TAB) 20 MEQ    Take 1 tablet (20 mEq total) by mouth once daily.    PREDNISOLONE ACETATE (PRED FORTE) 1 % DRPS    Place 1 drop into both eyes 4 (four) times daily.    PROMETHAZINE (PHENERGAN) 25 MG TABLET    Take 1 tablet (25 mg total) by mouth every 6 (six) hours as needed for Nausea (may cause drowsiness).    SPIRONOLACTONE (ALDACTONE) 25 MG TABLET    Take 1 tablet (25 mg total) by mouth daily as needed (swelling).    SPIRONOLACTONE (ALDACTONE) 25 MG TABLET    1 tablet.    SULFAMETHOXAZOLE-TRIMETHOPRIM 800-160MG (BACTRIM DS) 800-160 MG TAB    Take 1 tablet by mouth 2 (two) times daily.    TRUE METRIX GLUCOSE TEST STRIP STRP    Inject 1-2 Sticks into the skin once daily at 6am.    VALSARTAN (DIOVAN) 80 MG TABLET    Take 1 tablet (80 mg total) by mouth once daily.    VIT C/E/ZN/COPPR/LUTEIN/ZEAXAN (PRESERVISION AREDS-2 ORAL)    Take by mouth.        Allergies: Hydromorphone and Tuberculin, purified protein derivative      Past Medical History:    Past Medical History:   Diagnosis Date    Adenomatous polyp of ascending colon 06/21/2022    Adenomatous polyp of transverse colon 06/21/2022    B12 deficiency     Bilateral carotid bruits     CAD (coronary artery disease)     sees Halina    CHF (congestive heart failure)     CVA (cerebral vascular accident)     Depression     Diabetes mellitus, type 2     Diverticula, colon 06/21/2022    GERD (gastroesophageal reflux disease)     Heart attack     History of colon  polyps     Hyperlipidemia     Hypertension     Iron deficiency anemia due to chronic blood loss 06/21/2022    Ischemic cardiomyopathy     Lumbar disc disease     OA (osteoarthritis)     PAD (peripheral artery disease)        Past Surgical History:    Past Surgical History:   Procedure Laterality Date    ANKLE SURGERY      APPENDECTOMY      BACK SURGERY      CARDIAC CATHETERIZATION      CHOLECYSTECTOMY      COLONOSCOPY  06/23/2022    hx colon polyps    CORNEAL TRANSPLANT Bilateral     CORONARY ANGIOPLASTY WITH STENT PLACEMENT      HYSTERECTOMY      TONSILLECTOMY      TOTAL ABDOMINAL HYSTERECTOMY W/ BILATERAL SALPINGOOPHORECTOMY           Social History:    Social History     Tobacco Use   Smoking Status Every Day    Current packs/day: 0.50    Average packs/day: 0.5 packs/day for 50.6 years (25.3 ttl pk-yrs)    Types: Cigarettes    Start date: 1973   Smokeless Tobacco Never   Tobacco Comments    Off and on smoker approximately 50 years      Social History     Substance and Sexual Activity   Alcohol Use Never     Social History     Substance and Sexual Activity   Drug Use Never         Family History:    Family History   Problem Relation Age of Onset    Osteoarthritis Mother     Diabetes Mother     Heart failure Mother     Hyperlipidemia Mother     Hypertension Mother     Cancer Father     Diabetes Father     Hypertension Father     Heart attack Father     Lung cancer Father     Cancer Brother     Multiple myeloma Brother        Review of Systems:    Review of Systems   Constitutional:  Positive for fatigue. Negative for appetite change, chills, fever and unexpected weight change.        Reports fatigue is improving   Eyes:  Negative for visual disturbance.   Respiratory:  Negative for cough and shortness of breath.    Cardiovascular:  Negative for chest pain.        Reports chronic, intermittent issues with swelling in legs - reports she is scheduled for vascular studies on legs and carotid arteries when she goes back  "to see Dr. Meade at [facility] next month   Gastrointestinal:  Positive for diarrhea. Negative for abdominal pain, change in bowel habit, constipation, nausea, vomiting and change in bowel habit.   Musculoskeletal:  Negative for arthralgias.   Integumentary:  Negative for rash.   Neurological:  Negative for dizziness and headaches.   Psychiatric/Behavioral:  The patient is not nervous/anxious.         Vitals:    Vitals:    08/07/23 1036   BP: 138/70   BP Location: Left arm   Patient Position: Sitting   BP Method: Large (Automatic)   Pulse: 74   Resp: 18   Temp: 99 °F (37.2 °C)   TempSrc: Oral   SpO2: 96%   Weight: 79.2 kg (174 lb 9.6 oz)   Height: 5' 6" (1.676 m)       Body mass index is 28.18 kg/m².    Wt Readings from Last 3 Encounters:   08/07/23 1036 79.2 kg (174 lb 9.6 oz)   06/22/23 1150 76.2 kg (168 lb)   06/19/23 0923 76.6 kg (168 lb 12.8 oz)        Physical Exam:    Physical Exam  Constitutional:       General: She is not in acute distress.     Appearance: Normal appearance.   HENT:      Nose: Nose normal.      Mouth/Throat:      Mouth: Mucous membranes are moist.      Pharynx: Oropharynx is clear.   Eyes:      Conjunctiva/sclera: Conjunctivae normal.   Cardiovascular:      Rate and Rhythm: Normal rate and regular rhythm.      Heart sounds: Normal heart sounds. No murmur heard.  Pulmonary:      Effort: Pulmonary effort is normal. No respiratory distress.      Breath sounds: Normal breath sounds. No wheezing, rhonchi or rales.   Abdominal:      General: Bowel sounds are normal.      Palpations: Abdomen is soft.      Tenderness: There is no abdominal tenderness.   Musculoskeletal:      Cervical back: Neck supple.      Right lower leg: No edema.      Left lower leg: No edema.   Skin:     Findings: No rash.   Neurological:      General: No focal deficit present.      Mental Status: She is alert. Mental status is at baseline.   Psychiatric:         Mood and Affect: Mood normal.           Assessment/Plan:   1. Type 2 " diabetes mellitus without complication, without long-term current use of insulin  -     CBC Auto Differential; Future; Expected date: 08/07/2023  -     Comprehensive Metabolic Panel; Future; Expected date: 08/07/2023  -     Lipid Panel; Future; Expected date: 08/07/2023  -     Hemoglobin A1C; Future; Expected date: 08/07/2023  -     Microalbumin/Creatinine Ratio, Urine    2. Essential hypertension  -     CBC Auto Differential; Future; Expected date: 08/07/2023  -     Comprehensive Metabolic Panel; Future; Expected date: 08/07/2023  -     Lipid Panel; Future; Expected date: 08/07/2023  -     Microalbumin/Creatinine Ratio, Urine    3. Mixed hyperlipidemia  -     CBC Auto Differential; Future; Expected date: 08/07/2023  -     Comprehensive Metabolic Panel; Future; Expected date: 08/07/2023  -     Lipid Panel; Future; Expected date: 08/07/2023    4. Diarrhea, unspecified type  -     H. pylori antigen, stool; Future; Expected date: 08/07/2023  -     Fecal fat, qualitative; Future; Expected date: 08/07/2023  -     Occult blood x 1, stool; Future; Expected date: 08/07/2023  -     Calprotectin, Stool; Future; Expected date: 08/07/2023  -     Stool culture; Future; Expected date: 08/07/2023         Active Problem List with Overview Notes    Diagnosis Date Noted    Mild chronic obstructive pulmonary disease 05/16/2022    Atherosclerotic heart disease of native coronary artery with other forms of angina pectoris     CHF (congestive heart failure)     History of CVA (cerebrovascular accident)     Ischemic cardiomyopathy     Essential hypertension 08/03/2021    Type 2 diabetes mellitus without complication, without long-term current use of insulin 08/03/2021    Depression     Bilateral carotid bruits     Mixed hyperlipidemia 08/03/2021    Hypokalemia 04/12/2023    Muscle spasm 04/12/2023    BMI 28.0-28.9,adult 01/31/2023    Iron deficiency anemia due to chronic blood loss 06/21/2022    Peripheral vascular disease 05/16/2022     OA (osteoarthritis)     Lumbar disc disease     Gastroesophageal reflux disease without esophagitis 08/03/2021    B12 deficiency     History of colon polyps     Actinic keratoses 03/07/2019    History of degenerative disc disease 03/07/2019    Diverticula, colon 06/21/2022        Health Maintenance:  Health Maintenance   Topic Date Due    Mammogram  07/07/2023    Hemoglobin A1c  07/12/2023    TETANUS VACCINE  01/31/2024 (Originally 11/2/1968)    Eye Exam  01/12/2024    Foot Exam  01/31/2024    Lipid Panel  04/12/2024    High Dose Statin  06/19/2024    Aspirin/Antiplatelet Therapy  06/19/2024    LDCT Lung Screen  06/22/2024    DEXA Scan  03/09/2025    Hepatitis C Screening  Completed       RTC in 4 months for chronic follow up.  RTC sooner if needed.   Patient voiced understanding and is agreeable to plan.      Dinora Montero MD    Family Medicine

## 2023-08-08 ENCOUNTER — TELEPHONE (OUTPATIENT)
Dept: FAMILY MEDICINE | Facility: CLINIC | Age: 73
End: 2023-08-08
Payer: MEDICARE

## 2023-08-08 NOTE — TELEPHONE ENCOUNTER
Contacted patient in regards to lab work. Informed patient that lab work was stable and requires no change in treatment. Patient voiced understanding and had no further questions.     ----- Message from Adriana Montero MD sent at 8/8/2023 10:36 AM CDT -----  Please contact the patient and let them know that their results were stable/ok and do not require any change in treatment.

## 2023-08-10 ENCOUNTER — PATIENT OUTREACH (OUTPATIENT)
Dept: ADMINISTRATIVE | Facility: HOSPITAL | Age: 73
End: 2023-08-10

## 2023-08-10 LAB
FATTY ACIDS: NORMAL /HPF
H. PYLORI STOOL: POSITIVE
NEUTRAL FATS: NORMAL /HPF
OCCULT BLOOD: NEGATIVE

## 2023-08-10 PROCEDURE — 82705 FECAL FAT, QUALITATIVE: ICD-10-PCS | Mod: ,,, | Performed by: CLINICAL MEDICAL LABORATORY

## 2023-08-10 PROCEDURE — 87338 H. PYLORI ANTIGEN, STOOL: ICD-10-PCS | Mod: ,,, | Performed by: CLINICAL MEDICAL LABORATORY

## 2023-08-10 PROCEDURE — 82272 OCCULT BLOOD X 1, STOOL: ICD-10-PCS | Mod: ,,, | Performed by: CLINICAL MEDICAL LABORATORY

## 2023-08-10 PROCEDURE — 87046 PR  STOOL CULTURE, ADDL PATHOGENS: ICD-10-PCS | Mod: ,,, | Performed by: CLINICAL MEDICAL LABORATORY

## 2023-08-10 PROCEDURE — 83993 ASSAY FOR CALPROTECTIN FECAL: CPT | Mod: 90,,, | Performed by: CLINICAL MEDICAL LABORATORY

## 2023-08-10 PROCEDURE — 82705 FATS/LIPIDS FECES QUAL: CPT | Mod: ,,, | Performed by: CLINICAL MEDICAL LABORATORY

## 2023-08-10 PROCEDURE — 87899 AGENT NOS ASSAY W/OPTIC: CPT | Mod: ,,, | Performed by: CLINICAL MEDICAL LABORATORY

## 2023-08-10 PROCEDURE — 87045 CULTURE, STOOL: ICD-10-PCS | Mod: ,,, | Performed by: CLINICAL MEDICAL LABORATORY

## 2023-08-10 PROCEDURE — 87045 FECES CULTURE AEROBIC BACT: CPT | Mod: ,,, | Performed by: CLINICAL MEDICAL LABORATORY

## 2023-08-10 PROCEDURE — 87427 PR  SHIGA-LIKE TOXIN AG, EIA: ICD-10-PCS | Mod: ,,, | Performed by: CLINICAL MEDICAL LABORATORY

## 2023-08-10 PROCEDURE — 87338 HPYLORI STOOL AG IA: CPT | Mod: ,,, | Performed by: CLINICAL MEDICAL LABORATORY

## 2023-08-10 PROCEDURE — 87427 SHIGA-LIKE TOXIN AG IA: CPT | Mod: ,,, | Performed by: CLINICAL MEDICAL LABORATORY

## 2023-08-10 PROCEDURE — 82272 OCCULT BLD FECES 1-3 TESTS: CPT | Mod: ,,, | Performed by: CLINICAL MEDICAL LABORATORY

## 2023-08-10 PROCEDURE — 83993 CALPROTECTIN, STOOL: ICD-10-PCS | Mod: 90,,, | Performed by: CLINICAL MEDICAL LABORATORY

## 2023-08-10 PROCEDURE — 87046 STOOL CULTR AEROBIC BACT EA: CPT | Mod: ,,, | Performed by: CLINICAL MEDICAL LABORATORY

## 2023-08-10 PROCEDURE — 87899 STOOL ANTIGEN TEST: ICD-10-PCS | Mod: ,,, | Performed by: CLINICAL MEDICAL LABORATORY

## 2023-08-11 LAB
CAMPYLOBACTER ANTIGEN: NEGATIVE
EHEC (SHIGA TOXIN 1): NEGATIVE
EHEC (SHIGA TOXIN 2): NEGATIVE

## 2023-08-12 LAB — SALM+SHIG+E COLI ETEC STL CULT: NORMAL

## 2023-08-15 DIAGNOSIS — A04.8 HELICOBACTER PYLORI STOOL TEST POSITIVE: Primary | ICD-10-CM

## 2023-08-15 LAB — CALPROTECTIN STL-MCNT: <50 MCG/G

## 2023-08-15 RX ORDER — METRONIDAZOLE 500 MG/1
500 TABLET ORAL 4 TIMES DAILY
Qty: 56 TABLET | Refills: 0 | Status: SHIPPED | OUTPATIENT
Start: 2023-08-15 | End: 2023-12-07

## 2023-08-15 RX ORDER — PANTOPRAZOLE SODIUM 40 MG/1
40 TABLET, DELAYED RELEASE ORAL 2 TIMES DAILY
Qty: 28 TABLET | Refills: 0 | Status: SHIPPED | OUTPATIENT
Start: 2023-08-15 | End: 2023-12-07 | Stop reason: SDUPTHER

## 2023-08-15 RX ORDER — BISMUTH SUBSALICYLATE 262 MG/1
2 TABLET ORAL 4 TIMES DAILY
Qty: 112 TABLET | Refills: 0 | Status: SHIPPED | OUTPATIENT
Start: 2023-08-15 | End: 2023-12-07

## 2023-08-15 RX ORDER — TETRACYCLINE HYDROCHLORIDE 500 MG/1
500 CAPSULE ORAL 4 TIMES DAILY
Qty: 54 CAPSULE | Refills: 0 | Status: SHIPPED | OUTPATIENT
Start: 2023-08-15 | End: 2023-12-07

## 2023-08-15 NOTE — TELEPHONE ENCOUNTER
----- Message from Adriana Montero MD sent at 8/11/2023  8:21 AM CDT -----  Please call patient regarding lab results. H Pylori stool test is positive. Recommend the following treatment regimen:  1. Protonix 40 mg PO BID X 14 days (Hold omeprazole while taking this medication - after completion of 2 week treatment regimen for H Pylor may resume omeprazole as previously prescribed).  2. Bismuth 2 tablets by mouth 4 times daily X 14 days.  3. Tetracycline 500 mg by mouth 4 times daily (with meals and at bedtime) X 14 days.  4. Metronidazole 500 mg by mouth 4 times daily X 14 days.  Please tell patient bismuth may turn stool dark.   2 weeks after completing treatment regimen patient will need to  a stool specimen cup from the lab to take home and bring a stool sample back to test for clearance. Please have patient make an appt in 2-4 weeks for follow up.   There are some stool studies that are still pending.   Thanks!          1533- call made to pt. Pt voiced understanding. Pt made an appt for 9/12/23. [facility]. Stated she will send daughter to  stool sample specimen cup.

## 2023-08-16 ENCOUNTER — TELEPHONE (OUTPATIENT)
Dept: FAMILY MEDICINE | Facility: CLINIC | Age: 73
End: 2023-08-16
Payer: MEDICARE

## 2023-08-29 LAB
LEFT EYE DM RETINOPATHY: NEGATIVE
RIGHT EYE DM RETINOPATHY: NEGATIVE

## 2023-09-12 ENCOUNTER — OFFICE VISIT (OUTPATIENT)
Dept: FAMILY MEDICINE | Facility: CLINIC | Age: 73
End: 2023-09-12
Payer: MEDICARE

## 2023-09-12 VITALS
BODY MASS INDEX: 28.67 KG/M2 | SYSTOLIC BLOOD PRESSURE: 129 MMHG | RESPIRATION RATE: 18 BRPM | HEIGHT: 66 IN | DIASTOLIC BLOOD PRESSURE: 81 MMHG | OXYGEN SATURATION: 96 % | TEMPERATURE: 99 F | HEART RATE: 77 BPM | WEIGHT: 178.38 LBS

## 2023-09-12 DIAGNOSIS — N76.0 ACUTE VAGINITIS: ICD-10-CM

## 2023-09-12 DIAGNOSIS — A04.8 HELICOBACTER PYLORI STOOL TEST POSITIVE: Primary | ICD-10-CM

## 2023-09-12 LAB — H. PYLORI STOOL: NEGATIVE

## 2023-09-12 PROCEDURE — 99213 PR OFFICE/OUTPT VISIT, EST, LEVL III, 20-29 MIN: ICD-10-PCS | Mod: ,,, | Performed by: FAMILY MEDICINE

## 2023-09-12 PROCEDURE — 87338 H. PYLORI ANTIGEN, STOOL: ICD-10-PCS | Mod: ,,, | Performed by: CLINICAL MEDICAL LABORATORY

## 2023-09-12 PROCEDURE — 87338 HPYLORI STOOL AG IA: CPT | Mod: ,,, | Performed by: CLINICAL MEDICAL LABORATORY

## 2023-09-12 PROCEDURE — 99213 OFFICE O/P EST LOW 20 MIN: CPT | Mod: ,,, | Performed by: FAMILY MEDICINE

## 2023-09-12 RX ORDER — FLUCONAZOLE 150 MG/1
TABLET ORAL
Qty: 3 TABLET | Refills: 0 | Status: SHIPPED | OUTPATIENT
Start: 2023-09-12 | End: 2023-12-07

## 2023-09-12 RX ORDER — FLUOROMETHOLONE ACETATE 1 MG/ML
1 SUSPENSION/ DROPS OPHTHALMIC DAILY
COMMUNITY
Start: 2023-08-29

## 2023-09-12 NOTE — PROGRESS NOTES
Clinic Note    Patient Name: Maddie Ya  : 1950  MRN: [id number]    Chief Complaint   Patient presents with    Follow-up     Follow up for H.Pylori       HPI:    Ms. Maddie Ya is a 72 y.o. female who presents to clinic today with CC of follow up on H pylori. Reports she has completed antibiotics. Reports GI symptoms are improved.  Reports she still has some diarrhea but it is also improved.  States she needs to repeat stool studies to check for clearance of H pylori.   BP is mildly elevated today. Patient reports she has not yet had her medication this morning. Reports normal readings at home.  Patient is, otherwise, without complaints.     Medications:  Medication List with Changes/Refills   New Medications    FLUCONAZOLE (DIFLUCAN) 150 MG TAB    Take one tablet by mouth every 72 hours X 3 doses if needed for yeast infection after completion of antibiotics.   Current Medications    ASPIRIN (ECOTRIN) 81 MG EC TABLET    Take 1 tablet by mouth Daily.    ATORVASTATIN (LIPITOR) 80 MG TABLET    Take 1 tablet (80 mg total) by mouth every evening.    BISMUTH SUBSALICYLATE (BISMUTH) 262 MG CHEW    Take 2 tablets (524 mg total) by mouth 4 (four) times daily.    CARVEDILOL (COREG) 6.25 MG TABLET    Take 1 tablet (6.25 mg total) by mouth 2 (two) times daily.    CETIRIZINE (ZYRTEC) 10 MG TABLET    Take 1 tablet (10 mg total) by mouth once daily.    CILOSTAZOL (PLETAL) 100 MG TAB    Take 1 tablet (100 mg total) by mouth 2 (two) times daily.    CYANOCOBALAMIN 1,000 MCG/ML INJECTION    Once a month for 6 months.    CYCLOBENZAPRINE (FLEXERIL) 10 MG TABLET    Take 1 tablet (10 mg total) by mouth 3 (three) times daily as needed for Muscle spasms.    ERGOCALCIFEROL (ERGOCALCIFEROL) 50,000 UNIT CAP    Take 1 capsule (50,000 Units total) by mouth every 7 days.    ESCITALOPRAM OXALATE (LEXAPRO) 20 MG TABLET    Take 1 tablet (20 mg total) by mouth once daily.    FERROUS SULFATE (FEOSOL) 325 MG (65 MG IRON) TAB TABLET     Take 1 tablet (325 mg total) by mouth daily with breakfast.    FLAREX 0.1 % DRPS    Place 1 drop into both eyes Daily.    FLUTICASONE PROPIONATE (FLONASE) 50 MCG/ACTUATION NASAL SPRAY    1 spray (50 mcg total) by Each Nostril route once daily.    FUROSEMIDE (LASIX) 40 MG TABLET    Take 1 tablet (40 mg total) by mouth daily as needed (swelling).    GLIPIZIDE (GLUCOTROL) 2.5 MG TR24    Take 2 tablets (5 mg total) by mouth daily with breakfast.    METRONIDAZOLE (FLAGYL) 500 MG TABLET    Take 1 tablet (500 mg total) by mouth 4 (four) times daily.    PANTOPRAZOLE (PROTONIX) 40 MG TABLET    Take 1 tablet (40 mg total) by mouth 2 (two) times daily. Hold omeprazole until finished with this med THEN resume omeprazole.    POTASSIUM CHLORIDE (K-TAB) 20 MEQ    Take 1 tablet (20 mEq total) by mouth once daily.    PROMETHAZINE (PHENERGAN) 25 MG TABLET    Take 1 tablet (25 mg total) by mouth every 6 (six) hours as needed for Nausea (may cause drowsiness).    SPIRONOLACTONE (ALDACTONE) 25 MG TABLET    Take 1 tablet (25 mg total) by mouth daily as needed (swelling).    SPIRONOLACTONE (ALDACTONE) 25 MG TABLET    1 tablet.    SULFAMETHOXAZOLE-TRIMETHOPRIM 800-160MG (BACTRIM DS) 800-160 MG TAB    Take 1 tablet by mouth 2 (two) times daily.    TETRACYCLINE (ACHROMYCIN,SUMYCIN) 500 MG CAPSULE    Take 1 capsule (500 mg total) by mouth 4 (four) times daily.    TRUE METRIX GLUCOSE TEST STRIP STRP    Inject 1-2 Sticks into the skin once daily at 6am.    VALSARTAN (DIOVAN) 80 MG TABLET    Take 1 tablet (80 mg total) by mouth once daily.    VIT C/E/ZN/COPPR/LUTEIN/ZEAXAN (PRESERVISION AREDS-2 ORAL)    Take by mouth.   Discontinued Medications    NEOMYCIN-POLYMYXIN-DEXAMETHASONE (DEXACINE) 3.5 MG/G-10,000 UNIT/G-0.1 % OINT    Place 1 drop into both eyes every 6 (six) hours.    PREDNISOLONE ACETATE (PRED FORTE) 1 % DRPS    Place 1 drop into both eyes 4 (four) times daily.        Allergies: Hydromorphone and Tuberculin, purified protein  derivative      Past Medical History:    Past Medical History:   Diagnosis Date    Adenomatous polyp of ascending colon 06/21/2022    Adenomatous polyp of transverse colon 06/21/2022    B12 deficiency     Bilateral carotid bruits     CAD (coronary artery disease)     sees Halina    CHF (congestive heart failure)     CVA (cerebral vascular accident)     Depression     Diabetes mellitus, type 2     Diverticula, colon 06/21/2022    GERD (gastroesophageal reflux disease)     Heart attack     History of colon polyps     Hyperlipidemia     Hypertension     Iron deficiency anemia due to chronic blood loss 06/21/2022    Ischemic cardiomyopathy     Lumbar disc disease     OA (osteoarthritis)     PAD (peripheral artery disease)        Past Surgical History:    Past Surgical History:   Procedure Laterality Date    ANKLE SURGERY      APPENDECTOMY      BACK SURGERY      CARDIAC CATHETERIZATION      CHOLECYSTECTOMY      COLONOSCOPY  06/23/2022    hx colon polyps    CORNEAL TRANSPLANT Bilateral     CORONARY ANGIOPLASTY WITH STENT PLACEMENT      HYSTERECTOMY      TONSILLECTOMY      TOTAL ABDOMINAL HYSTERECTOMY W/ BILATERAL SALPINGOOPHORECTOMY           Social History:    Social History     Tobacco Use   Smoking Status Every Day    Current packs/day: 0.50    Average packs/day: 0.5 packs/day for 50.7 years (25.3 ttl pk-yrs)    Types: Cigarettes    Start date: 1973   Smokeless Tobacco Never   Tobacco Comments    Off and on smoker approximately 50 years      Social History     Substance and Sexual Activity   Alcohol Use Never     Social History     Substance and Sexual Activity   Drug Use Never         Family History:    Family History   Problem Relation Age of Onset    Osteoarthritis Mother     Diabetes Mother     Heart failure Mother     Hyperlipidemia Mother     Hypertension Mother     Cancer Father     Diabetes Father     Hypertension Father     Heart attack Father     Lung cancer Father     Cancer Brother     Multiple myeloma  "Brother        Review of Systems:    Review of Systems   Constitutional:  Negative for appetite change, chills, fatigue, fever and unexpected weight change.   Eyes:  Negative for visual disturbance.   Respiratory:  Negative for cough and shortness of breath.    Cardiovascular:  Negative for chest pain and leg swelling.   Gastrointestinal:  Negative for abdominal pain, change in bowel habit, constipation, nausea, vomiting and change in bowel habit.        Reports some diarrhea but states it is improved.   GI symptoms from H pylor much improved   Genitourinary:         Reports mild yeast infection from recent antibiotic use but reports it is improving   Musculoskeletal:  Negative for arthralgias.   Integumentary:  Negative for rash.   Neurological:  Negative for dizziness and headaches.   Psychiatric/Behavioral:  The patient is not nervous/anxious.         Vitals:    Vitals:    09/12/23 0838 09/12/23 0952   BP: (!) 143/82 129/81   BP Location: Left arm Left arm   Patient Position: Sitting Sitting   BP Method: Large (Automatic) Medium (Automatic)   Pulse: 77    Resp: 18    Temp: 98.5 °F (36.9 °C)    TempSrc: Oral    SpO2: 96%    Weight: 80.9 kg (178 lb 6.4 oz)    Height: 5' 6" (1.676 m)        Body mass index is 28.79 kg/m².    Wt Readings from Last 3 Encounters:   09/12/23 0838 80.9 kg (178 lb 6.4 oz)   08/07/23 1036 79.2 kg (174 lb 9.6 oz)   06/22/23 1150 76.2 kg (168 lb)        Physical Exam:    Physical Exam  Constitutional:       General: She is not in acute distress.     Appearance: Normal appearance.   HENT:      Nose: Nose normal.      Mouth/Throat:      Mouth: Mucous membranes are moist.      Pharynx: Oropharynx is clear.   Eyes:      Conjunctiva/sclera: Conjunctivae normal.   Cardiovascular:      Rate and Rhythm: Normal rate and regular rhythm.      Heart sounds: Normal heart sounds. No murmur heard.  Pulmonary:      Effort: Pulmonary effort is normal. No respiratory distress.      Breath sounds: Normal " breath sounds. No wheezing, rhonchi or rales.   Abdominal:      General: Bowel sounds are normal.      Palpations: Abdomen is soft.      Tenderness: There is no abdominal tenderness. There is no guarding or rebound.   Musculoskeletal:      Cervical back: Neck supple.      Right lower leg: No edema.      Left lower leg: No edema.   Skin:     Findings: No rash.   Neurological:      General: No focal deficit present.      Mental Status: She is alert. Mental status is at baseline.   Psychiatric:         Mood and Affect: Mood normal.         Assessment/Plan:   1. Helicobacter pylori stool test positive  -     H. pylori antigen, stool    2. Acute vaginitis  -     fluconazole (DIFLUCAN) 150 MG Tab; Take one tablet by mouth every 72 hours X 3 doses if needed for yeast infection after completion of antibiotics.  Dispense: 3 tablet; Refill: 0         Active Problem List with Overview Notes    Diagnosis Date Noted    Mild chronic obstructive pulmonary disease 05/16/2022    Atherosclerotic heart disease of native coronary artery with other forms of angina pectoris     CHF (congestive heart failure)     History of CVA (cerebrovascular accident)     Ischemic cardiomyopathy     Essential hypertension 08/03/2021    Type 2 diabetes mellitus without complication, without long-term current use of insulin 08/03/2021    Depression     Bilateral carotid bruits     Mixed hyperlipidemia 08/03/2021    Hypokalemia 04/12/2023    Muscle spasm 04/12/2023    BMI 28.0-28.9,adult 01/31/2023    Iron deficiency anemia due to chronic blood loss 06/21/2022    Peripheral vascular disease 05/16/2022    OA (osteoarthritis)     Lumbar disc disease     Gastroesophageal reflux disease without esophagitis 08/03/2021    B12 deficiency     History of colon polyps     Actinic keratoses 03/07/2019    History of degenerative disc disease 03/07/2019    Diverticula, colon 06/21/2022          RTC as scheduled for chronic follow up. RTC sooner if needed.  Patient  voiced understanding and is agreeable to plan.      Dinora Montero MD    Family Medicine

## 2023-09-18 ENCOUNTER — TELEPHONE (OUTPATIENT)
Dept: FAMILY MEDICINE | Facility: CLINIC | Age: 73
End: 2023-09-18
Payer: MEDICARE

## 2023-09-18 NOTE — TELEPHONE ENCOUNTER
----- Message from Adriana Montero MD sent at 9/15/2023  9:29 AM CDT -----  Please call patient to let her know that her repeat H pylori test is negative/normal. Thanks!    [id number]- call made to pt regarding above message. Pt voiced understanding.

## 2023-10-06 DIAGNOSIS — F32.89 OTHER DEPRESSION: Chronic | ICD-10-CM

## 2023-10-10 RX ORDER — ESCITALOPRAM OXALATE 20 MG/1
20 TABLET ORAL
Qty: 90 TABLET | Refills: 1 | Status: SHIPPED | OUTPATIENT
Start: 2023-10-10 | End: 2023-12-07 | Stop reason: SDUPTHER

## 2023-12-07 ENCOUNTER — OFFICE VISIT (OUTPATIENT)
Dept: FAMILY MEDICINE | Facility: CLINIC | Age: 73
End: 2023-12-07
Payer: MEDICARE

## 2023-12-07 VITALS
WEIGHT: 185.38 LBS | SYSTOLIC BLOOD PRESSURE: 139 MMHG | HEART RATE: 99 BPM | OXYGEN SATURATION: 99 % | TEMPERATURE: 99 F | BODY MASS INDEX: 29.79 KG/M2 | DIASTOLIC BLOOD PRESSURE: 79 MMHG | RESPIRATION RATE: 18 BRPM | HEIGHT: 66 IN

## 2023-12-07 DIAGNOSIS — K52.9 CHRONIC DIARRHEA: ICD-10-CM

## 2023-12-07 DIAGNOSIS — D50.0 IRON DEFICIENCY ANEMIA DUE TO CHRONIC BLOOD LOSS: ICD-10-CM

## 2023-12-07 DIAGNOSIS — Z23 IMMUNIZATION DUE: ICD-10-CM

## 2023-12-07 DIAGNOSIS — J30.2 SEASONAL ALLERGIES: Chronic | ICD-10-CM

## 2023-12-07 DIAGNOSIS — M62.838 MUSCLE SPASM: Chronic | ICD-10-CM

## 2023-12-07 DIAGNOSIS — E78.2 MIXED HYPERLIPIDEMIA: ICD-10-CM

## 2023-12-07 DIAGNOSIS — R10.30 LOWER ABDOMINAL PAIN: ICD-10-CM

## 2023-12-07 DIAGNOSIS — M51.9 LUMBAR DISC DISEASE: Chronic | ICD-10-CM

## 2023-12-07 DIAGNOSIS — E53.8 B12 DEFICIENCY: Chronic | ICD-10-CM

## 2023-12-07 DIAGNOSIS — K21.9 GASTROESOPHAGEAL REFLUX DISEASE WITHOUT ESOPHAGITIS: Chronic | ICD-10-CM

## 2023-12-07 DIAGNOSIS — E87.6 HYPOKALEMIA: Chronic | ICD-10-CM

## 2023-12-07 DIAGNOSIS — F32.89 OTHER DEPRESSION: Chronic | ICD-10-CM

## 2023-12-07 DIAGNOSIS — R29.6 FALL IN ELDERLY PATIENT: ICD-10-CM

## 2023-12-07 DIAGNOSIS — R11.0 NAUSEA: ICD-10-CM

## 2023-12-07 DIAGNOSIS — I10 ESSENTIAL HYPERTENSION: ICD-10-CM

## 2023-12-07 DIAGNOSIS — E11.9 TYPE 2 DIABETES MELLITUS WITHOUT COMPLICATION, WITHOUT LONG-TERM CURRENT USE OF INSULIN: Primary | ICD-10-CM

## 2023-12-07 LAB
ALBUMIN SERPL BCP-MCNC: 3.2 G/DL (ref 3.5–5)
ALBUMIN/GLOB SERPL: 0.9 {RATIO}
ALP SERPL-CCNC: 96 U/L (ref 55–142)
ALT SERPL W P-5'-P-CCNC: 28 U/L (ref 13–56)
ANION GAP SERPL CALCULATED.3IONS-SCNC: 10 MMOL/L (ref 7–16)
AST SERPL W P-5'-P-CCNC: 22 U/L (ref 15–37)
BASOPHILS # BLD AUTO: 0.03 K/UL (ref 0–0.2)
BASOPHILS NFR BLD AUTO: 0.3 % (ref 0–1)
BILIRUB SERPL-MCNC: 0.3 MG/DL (ref ?–1.2)
BUN SERPL-MCNC: 12 MG/DL (ref 7–18)
BUN/CREAT SERPL: 15 (ref 6–20)
CALCIUM SERPL-MCNC: 9 MG/DL (ref 8.5–10.1)
CHLORIDE SERPL-SCNC: 108 MMOL/L (ref 98–107)
CHOLEST SERPL-MCNC: 108 MG/DL (ref 0–200)
CHOLEST/HDLC SERPL: 1.6 {RATIO}
CO2 SERPL-SCNC: 28 MMOL/L (ref 21–32)
CREAT SERPL-MCNC: 0.81 MG/DL (ref 0.55–1.02)
DIFFERENTIAL METHOD BLD: ABNORMAL
EGFR (NO RACE VARIABLE) (RUSH/TITUS): 77 ML/MIN/1.73M2
EOSINOPHIL # BLD AUTO: 0.15 K/UL (ref 0–0.5)
EOSINOPHIL NFR BLD AUTO: 1.7 % (ref 1–4)
ERYTHROCYTE [DISTWIDTH] IN BLOOD BY AUTOMATED COUNT: 13.2 % (ref 11.5–14.5)
EST. AVERAGE GLUCOSE BLD GHB EST-MCNC: 126 MG/DL
GLOBULIN SER-MCNC: 3.5 G/DL (ref 2–4)
GLUCOSE SERPL-MCNC: 174 MG/DL (ref 74–106)
HBA1C MFR BLD HPLC: 6 % (ref 4.5–6.6)
HCT VFR BLD AUTO: 40.1 % (ref 38–47)
HDLC SERPL-MCNC: 66 MG/DL (ref 40–60)
HGB BLD-MCNC: 12.7 G/DL (ref 12–16)
IMM GRANULOCYTES # BLD AUTO: 0.03 K/UL (ref 0–0.04)
IMM GRANULOCYTES NFR BLD: 0.3 % (ref 0–0.4)
LDLC SERPL CALC-MCNC: 24 MG/DL
LYMPHOCYTES # BLD AUTO: 1.64 K/UL (ref 1–4.8)
LYMPHOCYTES NFR BLD AUTO: 19 % (ref 27–41)
MCH RBC QN AUTO: 31.6 PG (ref 27–31)
MCHC RBC AUTO-ENTMCNC: 31.7 G/DL (ref 32–36)
MCV RBC AUTO: 99.8 FL (ref 80–96)
MONOCYTES # BLD AUTO: 0.7 K/UL (ref 0–0.8)
MONOCYTES NFR BLD AUTO: 8.1 % (ref 2–6)
MPC BLD CALC-MCNC: 10.2 FL (ref 9.4–12.4)
NEUTROPHILS # BLD AUTO: 6.08 K/UL (ref 1.8–7.7)
NEUTROPHILS NFR BLD AUTO: 70.6 % (ref 53–65)
NONHDLC SERPL-MCNC: 42 MG/DL
NRBC # BLD AUTO: 0 X10E3/UL
NRBC, AUTO (.00): 0 %
PLATELET # BLD AUTO: 189 K/UL (ref 150–400)
POTASSIUM SERPL-SCNC: 3.7 MMOL/L (ref 3.5–5.1)
PROT SERPL-MCNC: 6.7 G/DL (ref 6.4–8.2)
RBC # BLD AUTO: 4.02 M/UL (ref 4.2–5.4)
SODIUM SERPL-SCNC: 142 MMOL/L (ref 136–145)
TRIGL SERPL-MCNC: 90 MG/DL (ref 35–150)
VIT B12 SERPL-MCNC: 239 PG/ML (ref 193–986)
VLDLC SERPL-MCNC: 18 MG/DL
WBC # BLD AUTO: 8.63 K/UL (ref 4.5–11)

## 2023-12-07 PROCEDURE — 83036 HEMOGLOBIN GLYCOSYLATED A1C: CPT | Mod: ,,, | Performed by: CLINICAL MEDICAL LABORATORY

## 2023-12-07 PROCEDURE — 82607 VITAMIN B12: ICD-10-PCS | Mod: ,,, | Performed by: CLINICAL MEDICAL LABORATORY

## 2023-12-07 PROCEDURE — 80053 COMPREHEN METABOLIC PANEL: CPT | Mod: ,,, | Performed by: CLINICAL MEDICAL LABORATORY

## 2023-12-07 PROCEDURE — G0008 FLU VACCINE - QUADRIVALENT - ADJUVANTED: ICD-10-PCS | Mod: ,,, | Performed by: FAMILY MEDICINE

## 2023-12-07 PROCEDURE — 82607 VITAMIN B-12: CPT | Mod: ,,, | Performed by: CLINICAL MEDICAL LABORATORY

## 2023-12-07 PROCEDURE — 80053 COMPREHENSIVE METABOLIC PANEL: ICD-10-PCS | Mod: ,,, | Performed by: CLINICAL MEDICAL LABORATORY

## 2023-12-07 PROCEDURE — 85025 CBC WITH DIFFERENTIAL: ICD-10-PCS | Mod: ,,, | Performed by: CLINICAL MEDICAL LABORATORY

## 2023-12-07 PROCEDURE — 85025 COMPLETE CBC W/AUTO DIFF WBC: CPT | Mod: ,,, | Performed by: CLINICAL MEDICAL LABORATORY

## 2023-12-07 PROCEDURE — 90694 VACC AIIV4 NO PRSRV 0.5ML IM: CPT | Mod: ,,, | Performed by: FAMILY MEDICINE

## 2023-12-07 PROCEDURE — 83036 HEMOGLOBIN A1C: ICD-10-PCS | Mod: ,,, | Performed by: CLINICAL MEDICAL LABORATORY

## 2023-12-07 PROCEDURE — 80061 LIPID PANEL: CPT | Mod: ,,, | Performed by: CLINICAL MEDICAL LABORATORY

## 2023-12-07 PROCEDURE — 99214 OFFICE O/P EST MOD 30 MIN: CPT | Mod: ,,, | Performed by: FAMILY MEDICINE

## 2023-12-07 PROCEDURE — G0008 ADMIN INFLUENZA VIRUS VAC: HCPCS | Mod: ,,, | Performed by: FAMILY MEDICINE

## 2023-12-07 PROCEDURE — 99214 PR OFFICE/OUTPT VISIT, EST, LEVL IV, 30-39 MIN: ICD-10-PCS | Mod: ,,, | Performed by: FAMILY MEDICINE

## 2023-12-07 PROCEDURE — 90694 FLU VACCINE - QUADRIVALENT - ADJUVANTED: ICD-10-PCS | Mod: ,,, | Performed by: FAMILY MEDICINE

## 2023-12-07 PROCEDURE — 80061 LIPID PANEL: ICD-10-PCS | Mod: ,,, | Performed by: CLINICAL MEDICAL LABORATORY

## 2023-12-07 RX ORDER — GLIPIZIDE 2.5 MG/1
5 TABLET, EXTENDED RELEASE ORAL
Qty: 90 TABLET | Refills: 1 | Status: SHIPPED | OUTPATIENT
Start: 2023-12-07 | End: 2024-12-06

## 2023-12-07 RX ORDER — ESCITALOPRAM OXALATE 20 MG/1
20 TABLET ORAL DAILY
Qty: 90 TABLET | Refills: 1 | Status: SHIPPED | OUTPATIENT
Start: 2023-12-07

## 2023-12-07 RX ORDER — ATORVASTATIN CALCIUM 80 MG/1
80 TABLET, FILM COATED ORAL NIGHTLY
Qty: 90 TABLET | Refills: 1 | Status: SHIPPED | OUTPATIENT
Start: 2023-12-07

## 2023-12-07 RX ORDER — CYCLOBENZAPRINE HCL 10 MG
10 TABLET ORAL 3 TIMES DAILY PRN
Qty: 45 TABLET | Refills: 2 | Status: SHIPPED | OUTPATIENT
Start: 2023-12-07

## 2023-12-07 RX ORDER — VALSARTAN 80 MG/1
80 TABLET ORAL DAILY
Qty: 90 TABLET | Refills: 1 | Status: SHIPPED | OUTPATIENT
Start: 2023-12-07

## 2023-12-07 RX ORDER — PANTOPRAZOLE SODIUM 40 MG/1
40 TABLET, DELAYED RELEASE ORAL 2 TIMES DAILY
Qty: 28 TABLET | Refills: 0 | Status: SHIPPED | OUTPATIENT
Start: 2023-12-07

## 2023-12-07 RX ORDER — CETIRIZINE HYDROCHLORIDE 10 MG/1
10 TABLET ORAL DAILY
Qty: 90 TABLET | Refills: 1 | Status: SHIPPED | OUTPATIENT
Start: 2023-12-07

## 2023-12-07 RX ORDER — FUROSEMIDE 40 MG/1
40 TABLET ORAL DAILY PRN
Qty: 90 TABLET | Refills: 1 | Status: SHIPPED | OUTPATIENT
Start: 2023-12-07

## 2023-12-07 RX ORDER — POTASSIUM CHLORIDE 1500 MG/1
20 TABLET, EXTENDED RELEASE ORAL DAILY
Qty: 90 TABLET | Refills: 1 | Status: SHIPPED | OUTPATIENT
Start: 2023-12-07

## 2023-12-07 RX ORDER — DICYCLOMINE HYDROCHLORIDE 10 MG/1
10 CAPSULE ORAL
Qty: 120 CAPSULE | Refills: 1 | Status: SHIPPED | OUTPATIENT
Start: 2023-12-07

## 2023-12-07 RX ORDER — CARVEDILOL 6.25 MG/1
6.25 TABLET ORAL 2 TIMES DAILY
Qty: 180 TABLET | Refills: 1 | Status: SHIPPED | OUTPATIENT
Start: 2023-12-07

## 2023-12-07 RX ORDER — PROMETHAZINE HYDROCHLORIDE 25 MG/1
25 TABLET ORAL EVERY 6 HOURS PRN
Qty: 30 TABLET | Refills: 0 | Status: SHIPPED | OUTPATIENT
Start: 2023-12-07

## 2023-12-07 NOTE — PROGRESS NOTES
Clinic Note    Patient Name: Maddie Ya  : 1950  MRN: [id number]    Chief Complaint   Patient presents with    Follow-up     4 month     Fall     Pt states she had a fall last night, states she thinks she is just sore from the fall     Medication Refill    Flu Vaccine       HPI:    Ms. Maddie Ya is a 73 y.o. female who presents to clinic today with CC of follow up on chronic disease processes including Type II DM, HTN, HLD, CHF, ischemic cardiomyopathy, depression, COPD, CAD, PVD, iron deficiency anemia, vitamin B12 deficiency, GERD, and OA.  Patient reports she fell last night. State she stepped on something, rolled her ankle, and got overbalanced and fell. Denies hitting her head. Reports she landed on her L side. Reports she does not feel like anything is broken. Reports she feels stiff and sore. Reports, however, she has a h/o arthritis. Reports she landed on carpet.   Patient reports chronic issues are well controlled on current medication regimen.  Denies problems or side effects with medications.  Requests flu vaccine.  Patient is, otherwise, without complaints.     Medications:  Medication List with Changes/Refills   New Medications    DICYCLOMINE (BENTYL) 10 MG CAPSULE    Take 1 capsule (10 mg total) by mouth 4 (four) times daily before meals and nightly. As needed for diarrhea   Current Medications    ASPIRIN (ECOTRIN) 81 MG EC TABLET    Take 1 tablet by mouth Daily.    CILOSTAZOL (PLETAL) 100 MG TAB    Take 1 tablet (100 mg total) by mouth 2 (two) times daily.    CYANOCOBALAMIN 1,000 MCG/ML INJECTION    Once a month for 6 months.    FERROUS SULFATE (FEOSOL) 325 MG (65 MG IRON) TAB TABLET    Take 1 tablet (325 mg total) by mouth daily with breakfast.    FLAREX 0.1 % DRPS    Place 1 drop into both eyes Daily.    FLUTICASONE PROPIONATE (FLONASE) 50 MCG/ACTUATION NASAL SPRAY    1 spray (50 mcg total) by Each Nostril route once daily.    SPIRONOLACTONE (ALDACTONE) 25 MG TABLET    Take 1 tablet  (25 mg total) by mouth daily as needed (swelling).    SPIRONOLACTONE (ALDACTONE) 25 MG TABLET    1 tablet.    TRUE METRIX GLUCOSE TEST STRIP STRP    Inject 1-2 Sticks into the skin once daily at 6am.    VIT C/E/ZN/COPPR/LUTEIN/ZEAXAN (PRESERVISION AREDS-2 ORAL)    Take by mouth.   Changed and/or Refilled Medications    Modified Medication Previous Medication    ATORVASTATIN (LIPITOR) 80 MG TABLET atorvastatin (LIPITOR) 80 MG tablet       Take 1 tablet (80 mg total) by mouth every evening.    Take 1 tablet (80 mg total) by mouth every evening.    CARVEDILOL (COREG) 6.25 MG TABLET carvediloL (COREG) 6.25 MG tablet       Take 1 tablet (6.25 mg total) by mouth 2 (two) times daily.    Take 1 tablet (6.25 mg total) by mouth 2 (two) times daily.    CETIRIZINE (ZYRTEC) 10 MG TABLET cetirizine (ZYRTEC) 10 MG tablet       Take 1 tablet (10 mg total) by mouth once daily.    Take 1 tablet (10 mg total) by mouth once daily.    CYCLOBENZAPRINE (FLEXERIL) 10 MG TABLET cyclobenzaprine (FLEXERIL) 10 MG tablet       Take 1 tablet (10 mg total) by mouth 3 (three) times daily as needed for Muscle spasms.    Take 1 tablet (10 mg total) by mouth 3 (three) times daily as needed for Muscle spasms.    ESCITALOPRAM OXALATE (LEXAPRO) 20 MG TABLET EScitalopram oxalate (LEXAPRO) 20 MG tablet       Take 1 tablet (20 mg total) by mouth once daily.    TAKE 1 TABLET(20 MG) BY MOUTH EVERY DAY    FUROSEMIDE (LASIX) 40 MG TABLET furosemide (LASIX) 40 MG tablet       Take 1 tablet (40 mg total) by mouth daily as needed (swelling).    Take 1 tablet (40 mg total) by mouth daily as needed (swelling).    GLIPIZIDE (GLUCOTROL) 2.5 MG TR24 glipiZIDE (GLUCOTROL) 2.5 MG TR24       Take 2 tablets (5 mg total) by mouth daily with breakfast.    Take 2 tablets (5 mg total) by mouth daily with breakfast.    PANTOPRAZOLE (PROTONIX) 40 MG TABLET pantoprazole (PROTONIX) 40 MG tablet       Take 1 tablet (40 mg total) by mouth 2 (two) times daily. Hold omeprazole until  finished with this med THEN resume omeprazole.    Take 1 tablet (40 mg total) by mouth 2 (two) times daily. Hold omeprazole until finished with this med THEN resume omeprazole.    POTASSIUM CHLORIDE (K-TAB) 20 MEQ potassium chloride (K-TAB) 20 mEq       Take 1 tablet (20 mEq total) by mouth once daily.    Take 1 tablet (20 mEq total) by mouth once daily.    PROMETHAZINE (PHENERGAN) 25 MG TABLET promethazine (PHENERGAN) 25 MG tablet       Take 1 tablet (25 mg total) by mouth every 6 (six) hours as needed for Nausea (may cause drowsiness).    Take 1 tablet (25 mg total) by mouth every 6 (six) hours as needed for Nausea (may cause drowsiness).    VALSARTAN (DIOVAN) 80 MG TABLET valsartan (DIOVAN) 80 MG tablet       Take 1 tablet (80 mg total) by mouth once daily.    Take 1 tablet (80 mg total) by mouth once daily.   Discontinued Medications    BISMUTH SUBSALICYLATE (BISMUTH) 262 MG CHEW    Take 2 tablets (524 mg total) by mouth 4 (four) times daily.    ERGOCALCIFEROL (ERGOCALCIFEROL) 50,000 UNIT CAP    Take 1 capsule (50,000 Units total) by mouth every 7 days.    FLUCONAZOLE (DIFLUCAN) 150 MG TAB    Take one tablet by mouth every 72 hours X 3 doses if needed for yeast infection after completion of antibiotics.    METRONIDAZOLE (FLAGYL) 500 MG TABLET    Take 1 tablet (500 mg total) by mouth 4 (four) times daily.    SULFAMETHOXAZOLE-TRIMETHOPRIM 800-160MG (BACTRIM DS) 800-160 MG TAB    Take 1 tablet by mouth 2 (two) times daily.    TETRACYCLINE (ACHROMYCIN,SUMYCIN) 500 MG CAPSULE    Take 1 capsule (500 mg total) by mouth 4 (four) times daily.        Allergies: Hydromorphone and Tuberculin, purified protein derivative      Past Medical History:    Past Medical History:   Diagnosis Date    Adenomatous polyp of ascending colon 06/21/2022    Adenomatous polyp of transverse colon 06/21/2022    B12 deficiency     Bilateral carotid bruits     CAD (coronary artery disease)     sees Halina    CHF (congestive heart failure)      CVA (cerebral vascular accident)     Depression     Diabetes mellitus, type 2     Diverticula, colon 06/21/2022    GERD (gastroesophageal reflux disease)     Heart attack     History of colon polyps     Hyperlipidemia     Hypertension     Iron deficiency anemia due to chronic blood loss 06/21/2022    Ischemic cardiomyopathy     Lumbar disc disease     OA (osteoarthritis)     PAD (peripheral artery disease)        Past Surgical History:    Past Surgical History:   Procedure Laterality Date    ANKLE SURGERY      APPENDECTOMY      BACK SURGERY      CARDIAC CATHETERIZATION      CHOLECYSTECTOMY      COLONOSCOPY  06/23/2022    hx colon polyps    CORNEAL TRANSPLANT Bilateral     CORONARY ANGIOPLASTY WITH STENT PLACEMENT      HYSTERECTOMY      TONSILLECTOMY      TOTAL ABDOMINAL HYSTERECTOMY W/ BILATERAL SALPINGOOPHORECTOMY           Social History:    Social History     Tobacco Use   Smoking Status Every Day    Current packs/day: 0.50    Average packs/day: 0.5 packs/day for 50.9 years (25.5 ttl pk-yrs)    Types: Cigarettes    Start date: 1973   Smokeless Tobacco Never   Tobacco Comments    Off and on smoker approximately 50 years      Social History     Substance and Sexual Activity   Alcohol Use Never     Social History     Substance and Sexual Activity   Drug Use Never         Family History:    Family History   Problem Relation Age of Onset    Osteoarthritis Mother     Diabetes Mother     Heart failure Mother     Hyperlipidemia Mother     Hypertension Mother     Cancer Father     Diabetes Father     Hypertension Father     Heart attack Father     Lung cancer Father     Cancer Brother     Multiple myeloma Brother        Review of Systems:    Review of Systems   Constitutional:  Negative for appetite change, chills, fatigue, fever and unexpected weight change.   Eyes:  Negative for visual disturbance.   Respiratory:  Negative for cough and shortness of breath.    Cardiovascular:  Negative for chest pain.        Reports  "chronic, intermittent issues with swelling in her legs; at baseline   Gastrointestinal:  Positive for abdominal pain and diarrhea. Negative for blood in stool, change in bowel habit, constipation, nausea and vomiting.        Reports some mild lower abdominal pain but denies dysuria  Reports chronic, intermittent issues with diarrhea; reports some improvement since stopping metformin but not resolved. States she has had a prior cholecystectomy.   Genitourinary:  Negative for dysuria.   Musculoskeletal:  Positive for arthralgias.   Integumentary:  Negative for rash.   Neurological:  Negative for dizziness and headaches.   Psychiatric/Behavioral:  The patient is not nervous/anxious.         Vitals:    Vitals:    12/07/23 0844   BP: 139/79   BP Location: Left arm   Patient Position: Sitting   BP Method: Large (Automatic)   Pulse: 99   Resp: 18   Temp: 98.8 °F (37.1 °C)   TempSrc: Oral   SpO2: 99%   Weight: 84.1 kg (185 lb 6.4 oz)   Height: 5' 6" (1.676 m)       Body mass index is 29.92 kg/m².    Wt Readings from Last 3 Encounters:   12/07/23 0844 84.1 kg (185 lb 6.4 oz)   09/12/23 0838 80.9 kg (178 lb 6.4 oz)   08/07/23 1036 79.2 kg (174 lb 9.6 oz)        Physical Exam:    Physical Exam  Constitutional:       General: She is not in acute distress.     Appearance: Normal appearance.   HENT:      Nose: Nose normal.      Mouth/Throat:      Mouth: Mucous membranes are moist.      Pharynx: Oropharynx is clear.   Eyes:      Conjunctiva/sclera: Conjunctivae normal.   Cardiovascular:      Rate and Rhythm: Normal rate and regular rhythm.      Heart sounds: Normal heart sounds. No murmur heard.  Pulmonary:      Effort: Pulmonary effort is normal. No respiratory distress.      Breath sounds: Normal breath sounds. No wheezing, rhonchi or rales.   Abdominal:      General: Bowel sounds are normal.      Palpations: Abdomen is soft.      Tenderness: There is no abdominal tenderness. There is no right CVA tenderness, left CVA " tenderness, guarding or rebound.   Musculoskeletal:         General: Normal range of motion.      Cervical back: Neck supple.      Right lower leg: No edema.      Left lower leg: No edema.      Comments: + trace edema bilateral ankles   Skin:     Findings: No rash.   Neurological:      General: No focal deficit present.      Mental Status: She is alert. Mental status is at baseline.   Psychiatric:         Mood and Affect: Mood normal.           Assessment/Plan:   1. Type 2 diabetes mellitus without complication, without long-term current use of insulin  -     glipiZIDE (GLUCOTROL) 2.5 MG TR24; Take 2 tablets (5 mg total) by mouth daily with breakfast.  Dispense: 90 tablet; Refill: 1  -     CBC Auto Differential; Future; Expected date: 12/07/2023  -     Comprehensive Metabolic Panel; Future; Expected date: 12/07/2023  -     Lipid Panel; Future; Expected date: 12/07/2023  -     Hemoglobin A1C; Future; Expected date: 12/07/2023  -     Microalbumin/Creatinine Ratio, Urine    2. Essential hypertension  -     carvediloL (COREG) 6.25 MG tablet; Take 1 tablet (6.25 mg total) by mouth 2 (two) times daily.  Dispense: 180 tablet; Refill: 1  -     valsartan (DIOVAN) 80 MG tablet; Take 1 tablet (80 mg total) by mouth once daily.  Dispense: 90 tablet; Refill: 1  -     furosemide (LASIX) 40 MG tablet; Take 1 tablet (40 mg total) by mouth daily as needed (swelling).  Dispense: 90 tablet; Refill: 1  -     CBC Auto Differential; Future; Expected date: 12/07/2023  -     Comprehensive Metabolic Panel; Future; Expected date: 12/07/2023  -     Lipid Panel; Future; Expected date: 12/07/2023  -     Microalbumin/Creatinine Ratio, Urine    3. Other depression  -     EScitalopram oxalate (LEXAPRO) 20 MG tablet; Take 1 tablet (20 mg total) by mouth once daily.  Dispense: 90 tablet; Refill: 1    4. Nausea  -     promethazine (PHENERGAN) 25 MG tablet; Take 1 tablet (25 mg total) by mouth every 6 (six) hours as needed for Nausea (may cause  drowsiness).  Dispense: 30 tablet; Refill: 0    5. Seasonal allergies  -     cetirizine (ZYRTEC) 10 MG tablet; Take 1 tablet (10 mg total) by mouth once daily.  Dispense: 90 tablet; Refill: 1    6. Mixed hyperlipidemia  -     atorvastatin (LIPITOR) 80 MG tablet; Take 1 tablet (80 mg total) by mouth every evening.  Dispense: 90 tablet; Refill: 1  -     CBC Auto Differential; Future; Expected date: 12/07/2023  -     Comprehensive Metabolic Panel; Future; Expected date: 12/07/2023  -     Lipid Panel; Future; Expected date: 12/07/2023    7. Hypokalemia  -     potassium chloride (K-TAB) 20 mEq; Take 1 tablet (20 mEq total) by mouth once daily.  Dispense: 90 tablet; Refill: 1  -     Comprehensive Metabolic Panel; Future; Expected date: 12/07/2023    8. Lumbar disc disease  -     cyclobenzaprine (FLEXERIL) 10 MG tablet; Take 1 tablet (10 mg total) by mouth 3 (three) times daily as needed for Muscle spasms.  Dispense: 45 tablet; Refill: 2    9. Muscle spasm  -     cyclobenzaprine (FLEXERIL) 10 MG tablet; Take 1 tablet (10 mg total) by mouth 3 (three) times daily as needed for Muscle spasms.  Dispense: 45 tablet; Refill: 2    10. Immunization due  -     Influenza (FLUAD) - Quadrivalent (Adjuvanted) *Preferred* (65+) (PF)    11. Lower abdominal pain  -     POCT URINALYSIS W/O SCOPE    12. Chronic diarrhea  -     Ambulatory referral/consult to Gastroenterology; Future; Expected date: 12/14/2023  -     dicyclomine (BENTYL) 10 MG capsule; Take 1 capsule (10 mg total) by mouth 4 (four) times daily before meals and nightly. As needed for diarrhea  Dispense: 120 capsule; Refill: 1- new medication. Risks/benefits/potential side effects/black box warning reviewed and discussed with patient.     13. Iron deficiency anemia due to chronic blood loss  -     CBC Auto Differential; Future; Expected date: 12/07/2023    14. B12 deficiency  -     Vitamin B12; Future; Expected date: 12/07/2023    15. Fall in elderly patient  - Denies any known  injury. Reports stiff and sore. Denies hitting her head. Declined imaging    16. Gastroesophageal reflux disease without esophagitis  -     pantoprazole (PROTONIX) 40 MG tablet; Take 1 tablet (40 mg total) by mouth 2 (two) times daily. Hold omeprazole until finished with this med THEN resume omeprazole.  Dispense: 28 tablet; Refill: 0         Active Problem List with Overview Notes    Diagnosis Date Noted    Mild chronic obstructive pulmonary disease 05/16/2022    Atherosclerotic heart disease of native coronary artery with other forms of angina pectoris     CHF (congestive heart failure)     History of CVA (cerebrovascular accident)     Ischemic cardiomyopathy     Essential hypertension 08/03/2021    Type 2 diabetes mellitus without complication, without long-term current use of insulin 08/03/2021    Depression     Bilateral carotid bruits     Mixed hyperlipidemia 08/03/2021    Hypokalemia 04/12/2023    Muscle spasm 04/12/2023    BMI 28.0-28.9,adult 01/31/2023    Iron deficiency anemia due to chronic blood loss 06/21/2022    Peripheral vascular disease 05/16/2022    OA (osteoarthritis)     Lumbar disc disease     Gastroesophageal reflux disease without esophagitis 08/03/2021    B12 deficiency     History of colon polyps     Actinic keratoses 03/07/2019    History of degenerative disc disease 03/07/2019    Diverticula, colon 06/21/2022        Health Maintenance:  Health Maintenance   Topic Date Due    TETANUS VACCINE  Never done    Shingles Vaccine (1 of 2) Never done    Hemoglobin A1c  11/07/2023    Foot Exam  01/31/2024    LDCT Lung Screen  06/22/2024    Mammogram  07/28/2024    Eye Exam  08/29/2024    Lipid Panel  09/18/2024    High Dose Statin  12/07/2024    Aspirin/Antiplatelet Therapy  12/07/2024    DEXA Scan  03/09/2025    Colorectal Cancer Screening  06/21/2025    Hepatitis C Screening  Completed       RTC in 4 months for chronic follow up.  RTC sooner if needed.   Patient voiced understanding and is  agreeable to plan.      Dinora Montero MD    Family Medicine

## 2023-12-09 DIAGNOSIS — Z71.89 COMPLEX CARE COORDINATION: ICD-10-CM

## 2023-12-11 DIAGNOSIS — E53.8 B12 DEFICIENCY: Chronic | ICD-10-CM

## 2023-12-11 RX ORDER — CYANOCOBALAMIN 1000 UG/ML
INJECTION, SOLUTION INTRAMUSCULAR; SUBCUTANEOUS
Qty: 1 ML | Refills: 6 | Status: SHIPPED | OUTPATIENT
Start: 2023-12-11

## 2023-12-11 NOTE — TELEPHONE ENCOUNTER
----- Message from Adriana Montero MD sent at 12/8/2023  8:16 AM CST -----  Please call patient regarding lab results.   Vitamin B12 is low. Patient would likely benefit from Vitamin B12 injections once monthly X 6 months. Patient can come in for a nurse only visit monthly or we can send medication to pharmacy and patient can give injections at home.   Labs, otherwise, stable. Thanks!      1972- call made to pt. Pt voiced understanding and states to send meds to pharmacy. Also states that she will bring urine sample back to clinic as soon as possible.

## 2023-12-19 DIAGNOSIS — E11.9 TYPE 2 DIABETES MELLITUS WITHOUT COMPLICATION, WITHOUT LONG-TERM CURRENT USE OF INSULIN: Primary | Chronic | ICD-10-CM

## 2024-01-25 ENCOUNTER — TELEPHONE (OUTPATIENT)
Dept: FAMILY MEDICINE | Facility: CLINIC | Age: 74
End: 2024-01-25
Payer: MEDICARE

## 2024-01-26 ENCOUNTER — OFFICE VISIT (OUTPATIENT)
Dept: GASTROENTEROLOGY | Facility: CLINIC | Age: 74
End: 2024-01-26
Payer: MEDICARE

## 2024-01-26 ENCOUNTER — HOSPITAL ENCOUNTER (OUTPATIENT)
Dept: RADIOLOGY | Facility: HOSPITAL | Age: 74
Discharge: HOME | End: 2024-01-26
Attending: NURSE PRACTITIONER
Payer: MEDICARE

## 2024-01-26 VITALS
DIASTOLIC BLOOD PRESSURE: 79 MMHG | BODY MASS INDEX: 29.44 KG/M2 | SYSTOLIC BLOOD PRESSURE: 158 MMHG | HEIGHT: 66 IN | WEIGHT: 183.19 LBS | HEART RATE: 78 BPM

## 2024-01-26 DIAGNOSIS — K52.9 CHRONIC DIARRHEA: ICD-10-CM

## 2024-01-26 DIAGNOSIS — K52.9 CHRONIC DIARRHEA: Primary | ICD-10-CM

## 2024-01-26 PROCEDURE — 99214 OFFICE O/P EST MOD 30 MIN: CPT | Mod: S$PBB,,, | Performed by: NURSE PRACTITIONER

## 2024-01-26 PROCEDURE — 74018 RADEX ABDOMEN 1 VIEW: CPT | Mod: 26,,, | Performed by: RADIOLOGY

## 2024-01-26 PROCEDURE — 99215 OFFICE O/P EST HI 40 MIN: CPT | Mod: PBBFAC | Performed by: NURSE PRACTITIONER

## 2024-01-26 PROCEDURE — 74018 RADEX ABDOMEN 1 VIEW: CPT | Mod: TC

## 2024-01-26 NOTE — PROGRESS NOTES
Maddie Ya is a 73 y.o. female here for Diarrhea        PCP: Usha Montero  Referring Provider: Usha Montero Md  [address]  [facility] [place] [id number]     HPI:  Presents with report of chronic diarrhea.  Patient reports she has had diarrhea ongoing for more than 6 months.  She did have a colonoscopy on 06/21/2022, tubular adenoma x2.  No biopsies were taken for microscopic colitis at that time.  She did have stool for culture and also calprotectin in August that was negative.  Stool for H pylori in September was positive.  This was a stool for clearance following treatment for H pylori based on stool specimen from 08/10/2023.  Stool negative for blood.  No weight loss.  No nocturnal diarrhea.  No history of IBD or celiac disease.  Reports that stool is urgent and results in fecal incontinence at times.  No abdominal pain.  Does not quantify the number of stools per day but does state that diarrhea is worse postprandially.  She has had a previous cholecystectomy.  Protonix is prescribed daily but patient states that she rarely takes Protonix, and only uses as needed.    Diarrhea   Pertinent negatives include no abdominal pain, fever or vomiting.         ROS:  Review of Systems   Constitutional:  Negative for appetite change, fatigue, fever and unexpected weight change.   HENT:  Negative for trouble swallowing.    Gastrointestinal:  Positive for diarrhea and fecal incontinence. Negative for abdominal pain, blood in stool, change in bowel habit, constipation, nausea, vomiting and reflux.   Musculoskeletal:  Negative for gait problem and joint swelling.   Integumentary:  Negative for pallor.   Psychiatric/Behavioral:  The patient is not nervous/anxious.           PMHX:  has a past medical history of Adenomatous polyp of ascending colon (06/21/2022), Adenomatous polyp of transverse colon (06/21/2022), B12 deficiency, Bilateral carotid bruits, CAD (coronary  artery disease), CHF (congestive heart failure), CVA (cerebral vascular accident), Depression, Diabetes mellitus, type 2, Diverticula, colon (06/21/2022), GERD (gastroesophageal reflux disease), Heart attack, History of colon polyps, Hyperlipidemia, Hypertension, Iron deficiency anemia due to chronic blood loss (06/21/2022), Ischemic cardiomyopathy, Lumbar disc disease, OA (osteoarthritis), and PAD (peripheral artery disease).    PSHX:  has a past surgical history that includes Tonsillectomy; Appendectomy; Cholecystectomy; Hysterectomy; Back surgery; Ankle surgery; Cardiac catheterization; Colonoscopy (06/23/2022); Coronary angioplasty with stent; Corneal transplant (Bilateral); and Total abdominal hysterectomy w/ bilateral salpingoophorectomy.    PFHX: family history includes Cancer in her brother and father; Diabetes in her father and mother; Heart attack in her father; Heart failure in her mother; Hyperlipidemia in her mother; Hypertension in her father and mother; Lung cancer in her father; Multiple myeloma in her brother; Osteoarthritis in her mother.    PSlHX:  reports that she has been smoking cigarettes. She started smoking about 51 years ago. She has a 25.5 pack-year smoking history. She has never used smokeless tobacco. She reports that she does not drink alcohol and does not use drugs.        Review of patient's allergies indicates:   Allergen Reactions    Hydromorphone Itching    Tuberculin, purified protein derivative        Medication List with Changes/Refills   Current Medications    ASPIRIN (ECOTRIN) 81 MG EC TABLET    Take 1 tablet by mouth Daily.    ATORVASTATIN (LIPITOR) 80 MG TABLET    Take 1 tablet (80 mg total) by mouth every evening.    CARVEDILOL (COREG) 6.25 MG TABLET    Take 1 tablet (6.25 mg total) by mouth 2 (two) times daily.    CETIRIZINE (ZYRTEC) 10 MG TABLET    Take 1 tablet (10 mg total) by mouth once daily.    CILOSTAZOL (PLETAL) 100 MG TAB    Take 1 tablet (100 mg total) by mouth 2  "(two) times daily.    CYANOCOBALAMIN 1,000 MCG/ML INJECTION    Once a month for 6 months.    CYCLOBENZAPRINE (FLEXERIL) 10 MG TABLET    Take 1 tablet (10 mg total) by mouth 3 (three) times daily as needed for Muscle spasms.    DICYCLOMINE (BENTYL) 10 MG CAPSULE    Take 1 capsule (10 mg total) by mouth 4 (four) times daily before meals and nightly. As needed for diarrhea    ESCITALOPRAM OXALATE (LEXAPRO) 20 MG TABLET    Take 1 tablet (20 mg total) by mouth once daily.    FLAREX 0.1 % DRPS    Place 1 drop into both eyes Daily.    FLUTICASONE PROPIONATE (FLONASE) 50 MCG/ACTUATION NASAL SPRAY    1 spray (50 mcg total) by Each Nostril route once daily.    FUROSEMIDE (LASIX) 40 MG TABLET    Take 1 tablet (40 mg total) by mouth daily as needed (swelling).    GLIPIZIDE (GLUCOTROL) 2.5 MG TR24    Take 2 tablets (5 mg total) by mouth daily with breakfast.    PANTOPRAZOLE (PROTONIX) 40 MG TABLET    Take 1 tablet (40 mg total) by mouth 2 (two) times daily. Hold omeprazole until finished with this med THEN resume omeprazole.    POTASSIUM CHLORIDE (K-TAB) 20 MEQ    Take 1 tablet (20 mEq total) by mouth once daily.    PROMETHAZINE (PHENERGAN) 25 MG TABLET    Take 1 tablet (25 mg total) by mouth every 6 (six) hours as needed for Nausea (may cause drowsiness).    SPIRONOLACTONE (ALDACTONE) 25 MG TABLET    Take 1 tablet (25 mg total) by mouth daily as needed (swelling).    SPIRONOLACTONE (ALDACTONE) 25 MG TABLET    1 tablet.    TRUE METRIX GLUCOSE TEST STRIP STRP    Inject 1-2 Sticks into the skin once daily at 6am.    VALSARTAN (DIOVAN) 80 MG TABLET    Take 1 tablet (80 mg total) by mouth once daily.    VIT C/E/ZN/COPPR/LUTEIN/ZEAXAN (PRESERVISION AREDS-2 ORAL)    Take by mouth.   Discontinued Medications    FERROUS SULFATE (FEOSOL) 325 MG (65 MG IRON) TAB TABLET    Take 1 tablet (325 mg total) by mouth daily with breakfast.        Objective Findings:  Vital Signs:  BP (!) 158/79   Pulse 78   Ht 5' 6" (1.676 m)   Wt 83.1 kg (183 " lb 3.2 oz)   BMI 29.57 kg/m²  Body mass index is 29.57 kg/m².    Physical Exam:  Physical Exam  Vitals and nursing note reviewed.   Constitutional:       General: She is not in acute distress.     Appearance: Normal appearance.   HENT:      Mouth/Throat:      Mouth: Mucous membranes are moist.   Cardiovascular:      Rate and Rhythm: Normal rate.   Pulmonary:      Effort: Pulmonary effort is normal.      Breath sounds: No wheezing, rhonchi or rales.   Abdominal:      General: Bowel sounds are normal. There is no distension.      Palpations: Abdomen is soft. There is no mass.      Tenderness: There is no abdominal tenderness. There is no guarding.   Skin:     General: Skin is warm and dry.      Coloration: Skin is not jaundiced or pale.   Neurological:      Mental Status: She is alert and oriented to person, place, and time.   Psychiatric:         Mood and Affect: Mood normal.          Labs:  Lab Results   Component Value Date    WBC 8.63 12/07/2023    HGB 12.7 12/07/2023    HCT 40.1 12/07/2023    MCV 99.8 (H) 12/07/2023    RDW 13.2 12/07/2023     12/07/2023    LYMPH 19.0 (L) 12/07/2023    LYMPH 1.64 12/07/2023    MONO 8.1 (H) 12/07/2023    EOS 0.15 12/07/2023    BASO 0.03 12/07/2023     Lab Results   Component Value Date     12/07/2023    K 3.7 12/07/2023     (H) 12/07/2023    CO2 28 12/07/2023     (H) 12/07/2023    BUN 12 12/07/2023    CREATININE 0.81 12/07/2023    CALCIUM 9.0 12/07/2023    PROT 6.7 12/07/2023    ALBUMIN 3.2 (L) 12/07/2023    BILITOT 0.3 12/07/2023    ALKPHOS 96 12/07/2023    AST 22 12/07/2023    ALT 28 12/07/2023         Imaging: No results found.      Assessment:  Maddie Ya is a 73 y.o. female here with:  1. Chronic diarrhea          Recommendations:  1. Labs and stool studies below  2. KUB  3. Avoid NSAID's  4. Consider colonoscopy with biopsy to rule out microscopic colitis    Follow up in about 3 months (around 4/26/2024).      Order summary:  Orders Placed This  Encounter    Fecal leukocytes    Giardia antigen    Enteric Pathogen Panel    C Diff Toxin by PCR    X-Ray KUB    Fecal fat, qualitative    Calprotectin, Stool    Occult blood x 1, stool    C-Reactive Protein    IgA    Gliadin (Deamidated) Ab, Eval    Endomysial antibody, IgA titer    Tissue Transglutaminase Ab, IgA       Thank you for allowing me to participate in the care of Maddie Ya.      CHRISTINE RayC

## 2024-01-29 NOTE — PROGRESS NOTES
"  Maddie Ya presented for a  Medicare AWV and comprehensive Health Risk Assessment today. The following components were reviewed and updated:    Medical history  Family History  Social history  Allergies and Current Medications  Health Risk Assessment  Health Maintenance  Care Team     74 yo WF presents to clinic today with CC of Medicare AWV. Patient has a PMH significant for Type II DM, HTN, HLD, CAD, CHF, Cardiomyopathy, h/o CVA. Lumbar disc disease, depression, COPD. Iron deficiency anemia, Vitamin B12 deficiency, GERD, and OA.  Patient reports chronic issues are well controlled on current medication regimen.  Denies problems or side effects with medications.  BP is elevated today. Patient reports that she has not had her medication this morning. Reports she saw Dr. Meade yesterday (Cardiology at [facility]) and was told she had the blood pressure, heart rate, and EKG of a teenager.  Patient is, otherwise, without complaints.     ** See Completed Assessments for Annual Wellness Visit within the encounter summary.**         The following assessments were completed:  Living Situation  CAGE  Depression Screening  Timed Get Up and Go  Whisper Test  Cognitive Function Screening  Nutrition Screening  ADL Screening  PAQ Screening          Vitals:    01/30/24 0830 01/30/24 0856   BP: (!) 168/82 (!) 150/80   BP Location: Left arm Left arm   Patient Position: Sitting Sitting   BP Method: Large (Manual) Large (Manual)   Pulse: 72    Resp: 14    Temp: 98.6 °F (37 °C)    TempSrc: Oral    SpO2: 95%    Weight: 84.8 kg (187 lb)    Height: 5' 6" (1.676 m)      Body mass index is 30.18 kg/m².  Physical Exam  Constitutional:       General: She is not in acute distress.     Appearance: Normal appearance. She is obese.   HENT:      Nose: Nose normal.      Mouth/Throat:      Mouth: Mucous membranes are moist.      Pharynx: Oropharynx is clear.   Eyes:      Conjunctiva/sclera: Conjunctivae normal.   Cardiovascular:      Rate and " Rhythm: Normal rate and regular rhythm.      Heart sounds: Normal heart sounds. No murmur heard.  Pulmonary:      Effort: Pulmonary effort is normal. No respiratory distress.      Breath sounds: Normal breath sounds. No wheezing, rhonchi or rales.   Abdominal:      General: Bowel sounds are normal.      Palpations: Abdomen is soft.      Tenderness: There is no abdominal tenderness.   Musculoskeletal:      Cervical back: Neck supple.      Right lower leg: No edema.      Left lower leg: No edema.   Skin:     Findings: No rash.   Neurological:      General: No focal deficit present.      Mental Status: She is alert. Mental status is at baseline.   Psychiatric:         Mood and Affect: Mood normal.     Protective Sensation (w/ 10 gram monofilament):  Right: Intact  Left: Intact    Visual Inspection:  Dry Skin -  Bilateral    Pedal Pulses:   Right: Present  Left: Present    Posterior Tibialis Pulses:   Right:Present  Left: Present          Diagnoses and health risks identified today and associated recommendations/orders:    1. Encounter for subsequent annual wellness visit (AWV) in Medicare patient      2. Type 2 diabetes mellitus without complication, without long-term current use of insulin  The current medical regimen is effective;  continue present plan and medications.  Hemoglobin A1C   Date Value Ref Range Status   12/07/2023 6.0 4.5 - 6.6 % Final     Comment:       Normal:               <5.7%  Pre-Diabetic:       5.7% to 6.4%  Diabetic:             >6.4%  Diabetic Goal:     <7%   08/07/2023 6.7 (H) 4.5 - 6.6 % Final     Comment:       Normal:               <5.7%  Pre-Diabetic:       5.7% to 6.4%  Diabetic:             >6.4%  Diabetic Goal:     <7%   04/12/2023 7.0 (H) 4.5 - 6.6 % Final     Comment:       Normal:               <5.7%  Pre-Diabetic:       5.7% to 6.4%  Diabetic:             >6.4%  Diabetic Goal:     <7%       3. Essential hypertension  - BP is elevated in clinic today. Patient reports normal readings  at home. Patient reports she has not yet had her medications this morning. Will have nurse follow up with phone call with home BP reading in 1-2 weeks. If remains elevated patient will need to RTC for medication adjustments.  - DASH diet and exercise  - Encouraged medication compliance  - Continue home blood pressure monitoring. Call/RTC for persistently elevated readings.    4. Mixed hyperlipidemia  The current medical regimen is effective;  continue present plan and medications.  - Low cholesterol diet  - Exercise  - Continue lipitor.    5. Atherosclerotic heart disease of native coronary artery with other forms of angina pectoris  The current medical regimen is effective;  continue present plan and medications.  - Follow up with Dr. Meade (Cardiology at [facility]) as scheduled.    6. Peripheral vascular disease  - Continue pletal  - stable    7. BMI 30.0-30.9,adult  - BMI discussed with patient.  - Diet and exercise  - Diet discussed and educational information provided  - Recommend 30 minutes of exercise at least 5 days per week.    8. History of CVA (cerebrovascular accident)  The current medical regimen is effective;  continue present plan and medications  - stable    9. Other depression  The current medical regimen is effective;  continue present plan and medications.  - Continue lexapro    10. Mild chronic obstructive pulmonary disease  - Denies any recent exacerbations    11. Chronic systolic congestive heart failure  The current medical regimen is effective;  continue present plan and medications.  - Follow up with Dr. Meade (Cardiology at [facility]) as scheduled.    12. Ischemic cardiomyopathy  The current medical regimen is effective;  continue present plan and medications.  - Follow up with Dr. Meade (Cardiology at [facility]) as scheduled.    13. Gastroesophageal reflux disease without esophagitis  The current medical regimen is effective;  continue present plan and medications.  - Continue protonix    14. Chronic  diarrhea  - Follow up with GI as scheduled    15. Primary osteoarthritis involving multiple joints  The current medical regimen is effective;  continue present plan and medications.    Eye Exam - Dr. Buenrostro in [place], [place] - every 6 months since she had a corneal transplant.    Provided Maddie with a 5-10 year written screening schedule and personal prevention plan. Recommendations were developed using the USPSTF age appropriate recommendations. Education, counseling, and referrals were provided as needed. After Visit Summary printed and given to patient which includes a list of additional screenings\tests needed.    Declines tetanus,rsv, shingle and covid booster vaccines    Assistance with smoking cessation was offered, including:  [x]  Medications  [x]  Counseling  [x]  Printed Information on Smoking Cessation  []  Referral to a Smoking Cessation Program    Patient was counseled regarding smoking for 3-10 minutes.      RTC as scheduled for chronic follow up. RTC sooner if needed.  Follow up in about 53 weeks (around 2/4/2025) for in 1 year for annual wellness visit at 8:00 a.m. .    Adriana Montero MD      I offered to discuss advanced care planning, including how to pick a person who would make decisions for you if you were unable to make them for yourself, called a health care power of , and what kind of decisions you might make such as use of life sustaining treatments such as ventilators and tube feeding when faced with a life limiting illness recorded on a living will that they will need to know. (How you want to be cared for as you near the end of your natural life)     X  Patient is unwilling to engage in a discussion regarding advance directives at this time.

## 2024-01-30 ENCOUNTER — OFFICE VISIT (OUTPATIENT)
Dept: FAMILY MEDICINE | Facility: CLINIC | Age: 74
End: 2024-01-30
Payer: MEDICARE

## 2024-01-30 VITALS
HEART RATE: 72 BPM | WEIGHT: 187 LBS | HEIGHT: 66 IN | DIASTOLIC BLOOD PRESSURE: 80 MMHG | OXYGEN SATURATION: 95 % | RESPIRATION RATE: 14 BRPM | SYSTOLIC BLOOD PRESSURE: 150 MMHG | BODY MASS INDEX: 30.05 KG/M2 | TEMPERATURE: 99 F

## 2024-01-30 DIAGNOSIS — J44.9 MILD CHRONIC OBSTRUCTIVE PULMONARY DISEASE: Chronic | ICD-10-CM

## 2024-01-30 DIAGNOSIS — I50.22 CHRONIC SYSTOLIC CONGESTIVE HEART FAILURE: Chronic | ICD-10-CM

## 2024-01-30 DIAGNOSIS — I25.5 ISCHEMIC CARDIOMYOPATHY: Chronic | ICD-10-CM

## 2024-01-30 DIAGNOSIS — I25.118 ATHEROSCLEROTIC HEART DISEASE OF NATIVE CORONARY ARTERY WITH OTHER FORMS OF ANGINA PECTORIS: ICD-10-CM

## 2024-01-30 DIAGNOSIS — E11.9 TYPE 2 DIABETES MELLITUS WITHOUT COMPLICATION, WITHOUT LONG-TERM CURRENT USE OF INSULIN: ICD-10-CM

## 2024-01-30 DIAGNOSIS — F32.89 OTHER DEPRESSION: Chronic | ICD-10-CM

## 2024-01-30 DIAGNOSIS — I73.9 PERIPHERAL VASCULAR DISEASE: ICD-10-CM

## 2024-01-30 DIAGNOSIS — K21.9 GASTROESOPHAGEAL REFLUX DISEASE WITHOUT ESOPHAGITIS: Chronic | ICD-10-CM

## 2024-01-30 DIAGNOSIS — K52.9 CHRONIC DIARRHEA: ICD-10-CM

## 2024-01-30 DIAGNOSIS — E78.2 MIXED HYPERLIPIDEMIA: ICD-10-CM

## 2024-01-30 DIAGNOSIS — I10 ESSENTIAL HYPERTENSION: ICD-10-CM

## 2024-01-30 DIAGNOSIS — M15.9 PRIMARY OSTEOARTHRITIS INVOLVING MULTIPLE JOINTS: Chronic | ICD-10-CM

## 2024-01-30 DIAGNOSIS — Z86.73 HISTORY OF CVA (CEREBROVASCULAR ACCIDENT): Chronic | ICD-10-CM

## 2024-01-30 DIAGNOSIS — Z00.00 ENCOUNTER FOR SUBSEQUENT ANNUAL WELLNESS VISIT (AWV) IN MEDICARE PATIENT: Primary | ICD-10-CM

## 2024-01-30 PROCEDURE — 99406 BEHAV CHNG SMOKING 3-10 MIN: CPT | Mod: ,,, | Performed by: FAMILY MEDICINE

## 2024-01-30 PROCEDURE — G0439 PPPS, SUBSEQ VISIT: HCPCS | Mod: ,,, | Performed by: FAMILY MEDICINE

## 2024-01-30 RX ORDER — OMEPRAZOLE 20 MG/1
CAPSULE, DELAYED RELEASE ORAL
COMMUNITY
Start: 2024-01-26

## 2024-01-30 NOTE — PATIENT INSTRUCTIONS
Counseling and Referral of Other Preventative  (Italic type indicates deductible and co-insurance are waived)    Patient Name: Maddie Ya  Today's Date: 1/30/2024    Health Maintenance       Date Due Completion Date    TETANUS VACCINE Never done ---    Shingles Vaccine (1 of 2) Never done ---    RSV Vaccine (Age 60+ and Pregnant patients) (1 - 1-dose 60+ series) Never done ---    COVID-19 Vaccine (4 - 2023-24 season) 09/01/2023 11/16/2021    Eye Exam 01/12/2024 1/12/2023    Foot Exam 01/31/2024 1/31/2023    Hemoglobin A1c 06/07/2024 12/7/2023    LDCT Lung Screen 06/22/2024 6/22/2023    Mammogram 07/28/2024 7/28/2023    Lipid Panel 12/07/2024 12/7/2023    Diabetes Urine Screening 12/19/2024 12/19/2023    High Dose Statin 01/26/2025 1/26/2024    Aspirin/Antiplatelet Therapy 01/26/2025 1/26/2024    DEXA Scan 03/09/2025 3/9/2020    Colorectal Cancer Screening 06/21/2025 6/21/2022        No orders of the defined types were placed in this encounter.    The following information is provided to all patients.  This information is to help you find resources for any of the problems found today that may be affecting your health:                  Living healthy guide: [place].gov    Understanding Diabetes: www.diabetes.org      Eating healthy: www.cdc.gov/healthyweight      CDC home safety checklist: www.cdc.gov/steadi/patient.html      Agency on Aging: [place].gov    Alcoholics anonymous (AA): www.aa.org      Physical Activity: www.irving.nih.gov/zu5evrz      Tobacco use: [place].gov

## 2024-01-30 NOTE — LETTER
AUTHORIZATION FOR RELEASE OF   CONFIDENTIAL INFORMATION    Dear Dr. Dusty Buenrostro,    We are seeing Maddie Ya, date of birth 1950, in the clinic at [facility]. Adriana Montero MD is the patient's PCP. Maddie Ya has an outstanding lab/procedure at the time we reviewed her chart. In order to help keep her health information updated, she has authorized us to request the following medical record(s):        (  )  MAMMOGRAM                                      (  )  COLONOSCOPY      (  )  PAP SMEAR                                          (  )  OUTSIDE LAB RESULTS     (  )  DEXA SCAN                                          (x  ) LAST  EYE EXAM            (  )  FOOT EXAM                                          (  )  ENTIRE RECORD     (  )  OUTSIDE IMMUNIZATIONS                 (  )  _______________         Please fax records to [facility], Hailey-Sharp, Anna-Marie, [place], [phone]     If you have any questions, please contact Lucille Ramirez rn awv nurse  at   [phone].           Patient Name: Maddie Ya  : 1950  Patient Phone #: [phone]

## 2024-02-01 ENCOUNTER — PATIENT OUTREACH (OUTPATIENT)
Dept: ADMINISTRATIVE | Facility: HOSPITAL | Age: 74
End: 2024-02-01

## 2024-02-01 LAB — OCCULT BLOOD: POSITIVE

## 2024-02-01 PROCEDURE — 82272 OCCULT BLD FECES 1-3 TESTS: CPT | Mod: ,,, | Performed by: CLINICAL MEDICAL LABORATORY

## 2024-02-01 NOTE — PROGRESS NOTES
Health maintenance record review for population health care gaps      Population Health Chart Review & Patient Outreach Details      Further Action Needed If Patient Returns Outreach:            Updates Requested / Reviewed:     [x]  Care Everywhere    [x]     []  External Sources ([facility], [facility], DIS, etc.)    [] [facility]   [] [facility]   [] Other:    [x]  Care Team Updated   []  Removed  or Duplicate Orders   []  Immunization Reconciliation Completed / Queried    [] [place]   [] [place]   [] [place]   [] [place]      Health Maintenance Topics Addressed and Outreach Outcomes / Actions Taken:             Breast Cancer Screening []  Mammogram Order Placed    []  Mammogram Screening Scheduled    []  External Records Requested & Care Team Updated if Applicable    []  External Records Uploaded & Care Team Updated if Applicable    []  Pt Declined Scheduling Mammogram    []  Pt Will Schedule with External Provider / Order Routed & Care Team Updated if Applicable              Cervical Cancer Screening []  Pap Smear Scheduled in Primary Care or OBGYN    []  External Records Requested & Care Team Updated if Applicable       []  External Records Uploaded, Care Team Updated, & History Updated if Applicable    []  Patient Declined Scheduling Pap Smear    []  Patient Will Schedule with External Provider & Care Team Updated if Applicable                  Colorectal Cancer Screening []  Colonoscopy Case Request / Referral / Home Test Order Placed    []  External Records Requested & Care Team Updated if Applicable    []  External Records Uploaded, Care Team Updated, & History Updated if Applicable    []  Patient Declined Completing Colon Cancer Screening    []  Patient Will Schedule with External Provider & Care Team Updated if Applicable    []  Fit Kit Mailed (add the SmartPhrase under additional notes)    []  Reminded Patient to Complete Home Test                Diabetic Eye Exam []  Eye Exam Screening Order  Placed    []  Eye Camera Scheduled or Optometry/Ophthalmology Referral Placed    [x]  External Records Requested & Care Team Updated if Applicable    []  External Records Uploaded, Care Team Updated, & History Updated if Applicable    []  Patient Declined Scheduling Eye Exam    []  Patient Will Schedule with External Provider & Care Team Updated if Applicable             Blood Pressure Control []  Primary Care Follow Up Visit Scheduled     []  Remote Blood Pressure Reading Captured    []  Patient Declined Remote Reading or Scheduling Appt - Escalated to PCP    []  Patient Will Call Back or Send Portal Message with Reading                 HbA1c & Other Labs []  Overdue Lab(s) Ordered    []  Overdue Lab(s) Scheduled    []  External Records Uploaded & Care Team Updated if Applicable    []  Primary Care Follow Up Visit Scheduled     []  Reminded Patient to Complete A1c Home Test    []  Patient Declined Scheduling Labs or Will Call Back to Schedule    []  Patient Will Schedule with External Provider / Order Routed, & Care Team Updated if Applicable           Primary Care Appointment []  Primary Care Appt Scheduled    []  Patient Declined Scheduling or Will Call Back to Schedule    []  Pt Established with External Provider, Updated Care Team, & Informed Pt to Notify Payor if Applicable           Medication Adherence /    Statin Use []  Primary Care Appointment Scheduled    []  Patient Reminded to  Prescription    []  Patient Declined, Provider Notified if Needed    []  Sent Provider Message to Review to Evaluate Pt for Statin, Add Exclusion Dx Codes, Document   Exclusion in Problem List, Change Statin Intensity Level to Moderate or High Intensity if Applicable                Osteoporosis Screening []  Dexa Order Placed    []  Dexa Appointment Scheduled    []  External Records Requested & Care Team Updated    []  External Records Uploaded, Care Team Updated, & History Updated if Applicable    []  Patient Declined  Scheduling Dexa or Will Call Back to Schedule    []  Patient Will Schedule with External Provider / Order Routed & Care Team Updated if Applicable       Additional Notes:

## 2024-02-01 NOTE — LETTER
AUTHORIZATION FOR RELEASE OF   CONFIDENTIAL INFORMATION    [facility] ,    We are seeing Maddie Ya, date of birth 1950, in the clinic at [facility]. Adriana Montero MD is the patient's PCP. Maddie Ya has an outstanding lab/procedure at the time we reviewed her chart. In order to help keep her health information updated, she has authorized us to request the following medical record(s):        (  )  MAMMOGRAM                                      (  )  COLONOSCOPY      (  )  PAP SMEAR                                          (  )  OUTSIDE LAB RESULTS     (  )  DEXA SCAN                                          x  )  EYE EXAM            (  )  FOOT EXAM                                          (  )  ENTIRE RECORD     (  )  OUTSIDE IMMUNIZATIONS                 (  )  _______________         Please fax records to [facility], Hailey-Sharp, Anna-Marie, [place], [phone]     If you have any questions, please contact Chad Franz at [phone].           Patient Name: Maddie Ya  : 1950  Patient Phone #: [phone]

## 2024-02-05 ENCOUNTER — TELEPHONE (OUTPATIENT)
Dept: FAMILY MEDICINE | Facility: CLINIC | Age: 74
End: 2024-02-05
Payer: MEDICARE

## 2024-02-05 NOTE — TELEPHONE ENCOUNTER
Contacted patient in regards to BP during her last office visit. Patient states her BP at home are been 130's/80's. Recommended to patient to keep recording her BP readings and if readings become elevated to return to clinic for BP check. Patient voiced understanding and had no further questions.     ----- Message from Kate Almaraz LPN sent at 1/31/2024  9:06 AM CST -----    ----- Message -----  From: Adriana Montero MD  Sent: 1/30/2024   1:04 PM CST  To: Kate Almaraz LPN    BP elevated in clinic. Patient reports normal readings at home. Please call to record home blood pressure reading. If remains elevated or if patient has not been monitoring they will need to RTC for follow up within 2 weeks of phone call. Thanks!         Spontaneous, unlabored and symmetrical

## 2024-03-01 ENCOUNTER — PATIENT MESSAGE (OUTPATIENT)
Dept: GASTROENTEROLOGY | Facility: CLINIC | Age: 74
End: 2024-03-01
Payer: MEDICARE

## 2024-04-26 ENCOUNTER — OFFICE VISIT (OUTPATIENT)
Dept: GASTROENTEROLOGY | Facility: CLINIC | Age: 74
End: 2024-04-26
Payer: MEDICARE

## 2024-04-26 VITALS
BODY MASS INDEX: 30.05 KG/M2 | HEART RATE: 84 BPM | HEIGHT: 66 IN | SYSTOLIC BLOOD PRESSURE: 178 MMHG | WEIGHT: 187 LBS | DIASTOLIC BLOOD PRESSURE: 82 MMHG

## 2024-04-26 DIAGNOSIS — R19.4 CHANGE IN BOWEL HABITS: ICD-10-CM

## 2024-04-26 DIAGNOSIS — K92.1 BLOOD IN STOOL: ICD-10-CM

## 2024-04-26 DIAGNOSIS — K52.9 CHRONIC DIARRHEA: Primary | ICD-10-CM

## 2024-04-26 PROCEDURE — 99214 OFFICE O/P EST MOD 30 MIN: CPT | Mod: S$PBB,,,

## 2024-04-26 PROCEDURE — 99215 OFFICE O/P EST HI 40 MIN: CPT | Mod: PBBFAC

## 2024-04-26 RX ORDER — POLYETHYLENE GLYCOL 3350, SODIUM SULFATE ANHYDROUS, SODIUM BICARBONATE, SODIUM CHLORIDE, POTASSIUM CHLORIDE 236; 22.74; 6.74; 5.86; 2.97 G/4L; G/4L; G/4L; G/4L; G/4L
4 POWDER, FOR SOLUTION ORAL ONCE
Qty: 4000 ML | Refills: 0 | Status: SHIPPED | OUTPATIENT
Start: 2024-04-26 | End: 2024-04-26

## 2024-04-26 NOTE — PATIENT INSTRUCTIONS
- I recommend starting a daily fiber supplement with Metamucil, Citrucel or Fibercon (can purchase at your local pharmacy)  - Imodium as needed

## 2024-05-08 ENCOUNTER — TELEPHONE (OUTPATIENT)
Dept: GASTROENTEROLOGY | Facility: CLINIC | Age: 74
End: 2024-05-08
Payer: MEDICARE

## 2024-05-08 NOTE — TELEPHONE ENCOUNTER
----- Message from DENILSON Sparks sent at 5/8/2024  1:40 PM CDT -----  Stool studies are unremarkable. Continue daily fiber and imodium as needed

## 2024-05-13 NOTE — PROGRESS NOTES
Gastroenterology Clinic Note    Patient ID: [id number]   Referring MD: No ref. provider found   Chief Complaint:   Chief Complaint   Patient presents with    Follow-up    Diarrhea    Rectal Bleeding     Dark blood in stool    Abdominal Pain       History of Present Illness   Maddie Ya is an 73 y.o. WF who is referred for follow-up.  Patient previously seen in GI Clinic by DENILSON Sin for chronic diarrhea.  Stool studies were ordered at that time but not collected.  Patient complains of urgency and associated abdominal cramping.  She reports diarrhea episodes up to 5 times per day with rectal bleeding.  She denies any weight loss.  Reports she rarely ever has a formed stool.  She takes Imodium but this does not help symptoms.  The diarrhea is new over the past year.    Previous workup:  X-ray KUB    Last colonoscopy was 06/21/2022 with 3 year recall for screening purposes.  Biopsies were not obtained.    Review of Systems   Constitutional:  Negative for weight loss.   Gastrointestinal:  Positive for abdominal pain, blood in stool and diarrhea. Negative for constipation, nausea and vomiting.       Past Medical History      Past Medical History:   Diagnosis Date    Adenomatous polyp of ascending colon 06/21/2022    Adenomatous polyp of transverse colon 06/21/2022    B12 deficiency     Bilateral carotid bruits     CAD (coronary artery disease)     sees Halina    CHF (congestive heart failure)     CVA (cerebral vascular accident)     Depression     Diabetes mellitus, type 2     Diverticula, colon 06/21/2022    GERD (gastroesophageal reflux disease)     Heart attack     History of colon polyps     Hyperlipidemia     Hypertension     Iron deficiency anemia due to chronic blood loss 06/21/2022    Ischemic cardiomyopathy     Lumbar disc disease     OA (osteoarthritis)     PAD (peripheral artery disease)        Past Surgical History     Past Surgical History:   Procedure Laterality Date    ANKLE SURGERY       APPENDECTOMY      BACK SURGERY      CARDIAC CATHETERIZATION      CHOLECYSTECTOMY      COLONOSCOPY  06/23/2022    hx colon polyps    CORNEAL TRANSPLANT Bilateral     CORONARY ANGIOPLASTY WITH STENT PLACEMENT      HYSTERECTOMY      TONSILLECTOMY      TOTAL ABDOMINAL HYSTERECTOMY W/ BILATERAL SALPINGOOPHORECTOMY         Allergies     Review of patient's allergies indicates:   Allergen Reactions    Hydromorphone Itching    Tuberculin, purified protein derivative        Immunization History     Immunization History   Administered Date(s) Administered    COVID-19, MRNA, LN-S, PF (MODERNA FULL 0.5 ML DOSE) 01/08/2021, 02/05/2021, 11/16/2021    Influenza (FLUAD) - Quadrivalent - Adjuvanted - PF *Preferred* (65+) 12/07/2023    Influenza - Quadrivalent 10/16/2019    Pneumococcal Conjugate - 13 Valent 01/29/2019    Pneumococcal Polysaccharide - 23 Valent 02/04/2020       Past Family History      Family History   Problem Relation Name Age of Onset    Osteoarthritis Mother      Diabetes Mother      Heart failure Mother      Hyperlipidemia Mother      Hypertension Mother      Cancer Father      Diabetes Father      Hypertension Father      Heart attack Father      Lung cancer Father      Cancer Brother      Multiple myeloma Brother      Autoimmune disease Daughter      Thyroid disease Daughter      Hypertension Daughter         Past Social History      Social History     Socioeconomic History    Marital status:    Tobacco Use    Smoking status: Every Day     Current packs/day: 0.50     Average packs/day: 0.5 packs/day for 51.4 years (25.7 ttl pk-yrs)     Types: Cigarettes     Start date: 1973     Passive exposure: Current    Smokeless tobacco: Never    Tobacco comments:     Off and on smoker approximately 50 years    Substance and Sexual Activity    Alcohol use: Never    Drug use: Never    Sexual activity: Not Currently     Social Determinants of Health     Financial Resource Strain: Low Risk  (1/30/2024)    Overall  Financial Resource Strain (CARDIA)     Difficulty of Paying Living Expenses: Not hard at all   Food Insecurity: No Food Insecurity (1/30/2024)    Hunger Vital Sign     Worried About Running Out of Food in the Last Year: Never true     Ran Out of Food in the Last Year: Never true   Transportation Needs: No Transportation Needs (1/30/2024)    PRAPARE - Transportation     Lack of Transportation (Medical): No     Lack of Transportation (Non-Medical): No   Physical Activity: Inactive (1/30/2024)    Exercise Vital Sign     Days of Exercise per Week: 0 days     Minutes of Exercise per Session: 0 min   Stress: Stress Concern Present (1/30/2024)    [employer] [place] [employer] - Occupational Stress Questionnaire     Feeling of Stress : To some extent   Housing Stability: Low Risk  (1/30/2024)    Housing Stability Vital Sign     Unable to Pay for Housing in the Last Year: No     Number of Places Lived in the Last Year: 1     Unstable Housing in the Last Year: No       Current Medications     Outpatient Medications Marked as Taking for the 4/26/24 encounter (Office Visit) with Pauline Roberto FNP   Medication Sig Dispense Refill    aspirin (ECOTRIN) 81 MG EC tablet Take 1 tablet by mouth Daily.      atorvastatin (LIPITOR) 80 MG tablet Take 1 tablet (80 mg total) by mouth every evening. 90 tablet 1    carvediloL (COREG) 6.25 MG tablet Take 1 tablet (6.25 mg total) by mouth 2 (two) times daily. 180 tablet 1    cetirizine (ZYRTEC) 10 MG tablet Take 1 tablet (10 mg total) by mouth once daily. 90 tablet 1    cilostazoL (PLETAL) 100 MG Tab Take 1 tablet (100 mg total) by mouth 2 (two) times daily. 180 tablet 1    cyanocobalamin 1,000 mcg/mL injection Once a month for 6 months. 1 mL 6    cyclobenzaprine (FLEXERIL) 10 MG tablet Take 1 tablet (10 mg total) by mouth 3 (three) times daily as needed for Muscle spasms. 45 tablet 2    dicyclomine (BENTYL) 10 MG capsule Take 1 capsule (10 mg total) by mouth 4 (four) times daily  "before meals and nightly. As needed for diarrhea 120 capsule 1    EScitalopram oxalate (LEXAPRO) 20 MG tablet Take 1 tablet (20 mg total) by mouth once daily. 90 tablet 1    FLAREX 0.1 % DrpS Place 1 drop into both eyes Daily.      fluticasone propionate (FLONASE) 50 mcg/actuation nasal spray 1 spray (50 mcg total) by Each Nostril route once daily. 15.8 mL 3    furosemide (LASIX) 40 MG tablet Take 1 tablet (40 mg total) by mouth daily as needed (swelling). 90 tablet 1    glipiZIDE (GLUCOTROL) 2.5 MG TR24 Take 2 tablets (5 mg total) by mouth daily with breakfast. 90 tablet 1    omeprazole (PRILOSEC) 20 MG capsule       pantoprazole (PROTONIX) 40 MG tablet Take 1 tablet (40 mg total) by mouth 2 (two) times daily. Hold omeprazole until finished with this med THEN resume omeprazole. 28 tablet 0    potassium chloride (K-TAB) 20 mEq Take 1 tablet (20 mEq total) by mouth once daily. 90 tablet 1    promethazine (PHENERGAN) 25 MG tablet Take 1 tablet (25 mg total) by mouth every 6 (six) hours as needed for Nausea (may cause drowsiness). 30 tablet 0    spironolactone (ALDACTONE) 25 MG tablet Take 1 tablet (25 mg total) by mouth daily as needed (swelling). 90 tablet 1    spironolactone (ALDACTONE) 25 MG tablet 1 tablet.      TRUE METRIX GLUCOSE TEST STRIP Strp Inject 1-2 Sticks into the skin once daily at 6am.      valsartan (DIOVAN) 80 MG tablet Take 1 tablet (80 mg total) by mouth once daily. 90 tablet 1    vit C/E/Zn/coppr/lutein/zeaxan (PRESERVISION AREDS-2 ORAL) Take by mouth.          I have reviewed the current medications, allergies, vital signs, past medical and surgical history, family medical history, and social history for this encounter and agree with all findings.    OBJECTIVE    Physical Exam    BP (!) 178/82   Pulse 84   Ht 5' 6" (1.676 m)   Wt 84.8 kg (187 lb)   BMI 30.18 kg/m²   GEN: Well appearing, cooperative, NAD  NECK: Supple, no LAD  CV: Normal rate  RESP: Unlabored  ABD: ND, no guarding  EXT: No " clubbing, cyanosis, or edema  SKIN: Warm and dry  NEURO: AAO x4.     LABS    CBC (with or without Differential):   Lab Results   Component Value Date    WBC 8.63 12/07/2023    HGB 12.7 12/07/2023    HCT 40.1 12/07/2023    MCV 99.8 (H) 12/07/2023    MCH 31.6 (H) 12/07/2023    MCHC 31.7 (L) 12/07/2023    RDW 13.2 12/07/2023     12/07/2023    MPV 10.2 12/07/2023    NEUTOPHILPCT 70.6 (H) 12/07/2023    DIFFTYPE Auto 12/07/2023     BMP/CMP:   Lab Results   Component Value Date     12/07/2023    K 3.7 12/07/2023     (H) 12/07/2023    CO2 28 12/07/2023    BUN 12 12/07/2023    CREATININE 0.81 12/07/2023     (H) 12/07/2023    CALCIUM 9.0 12/07/2023    ALBUMIN 3.2 (L) 12/07/2023    AST 22 12/07/2023    ALT 28 12/07/2023    ALKPHOS 96 12/07/2023        IMAGING  X-ray KUB 01/2024  - scattered fluid within the small and large bowel may reflect an element of gastroenteritis/diarrhea causing illness.  No abnormal bowel distention.    ASSESSMENT  Maddie Ya is a 73 y.o. WF with history of HEIDE, CAD, CHF, CVA, diabetes, GERD, hypertension, and CAD who is referred for follow-up.    1. Chronic diarrhea    2. Change in bowel habits    3. Blood in stool           PLAN    - stool studies to rule out infectious etiology  - schedule colonoscopy with biopsy for change in bowel habits and blood in stool; rule out microscopic colitis versus malignancy  Patient Instructions   - I recommend starting a daily fiber supplement with Metamucil, Citrucel or Fibercon (can purchase at your local pharmacy)  - Imodium as needed       Orders Placed This Encounter   Procedures    Giardia antigen     Standing Status:   Future     Number of Occurrences:   1     Standing Expiration Date:   6/25/2025    Fecal leukocytes     Standing Status:   Future     Number of Occurrences:   1     Standing Expiration Date:   6/25/2025    C Diff Toxin by PCR     Standing Status:   Future     Number of Occurrences:   1     Standing Expiration Date:    10/26/2024    Stool culture     Standing Status:   Future     Number of Occurrences:   1     Standing Expiration Date:   4/26/2025    Misc Sendout Test, Non-Blood Pancreatic Elastase     Pancreatic Elastase     Standing Status:   Future     Number of Occurrences:   1     Standing Expiration Date:   6/26/2025     Order Specific Question:   Source:     Answer:   Stool     Order Specific Question:   Name of Test     Answer:   Pancreatic Elastase     Order Specific Question:   Release to patient     Answer:   Immediate    Calprotectin, Stool     Standing Status:   Future     Number of Occurrences:   1     Standing Expiration Date:   6/26/2025         The risks and benefits of my recommendations, as well as other treatment options were discussed with the patient today. All questions were answered.    35 minutes of total time spent on the encounter, which includes face to face time and non-face to face time preparing to see the patient (eg, review of tests), obtaining and/or reviewing separately obtained history, documenting clinical information in the electronic or other health record, Independently interpreting results (not separately reported) and communicating results to the patient/family/caregiver, or care coordination (not separately reported).        Pauline Roberto, KEYSHAP/ACNP  [facility]

## 2024-05-15 ENCOUNTER — PATIENT OUTREACH (OUTPATIENT)
Dept: ADMINISTRATIVE | Facility: HOSPITAL | Age: 74
End: 2024-05-15

## 2024-07-09 DIAGNOSIS — Z71.89 COMPLEX CARE COORDINATION: ICD-10-CM

## 2024-08-06 ENCOUNTER — OFFICE VISIT (OUTPATIENT)
Dept: FAMILY MEDICINE | Facility: CLINIC | Age: 74
End: 2024-08-06
Payer: MEDICARE

## 2024-08-06 VITALS
RESPIRATION RATE: 17 BRPM | WEIGHT: 197.38 LBS | SYSTOLIC BLOOD PRESSURE: 148 MMHG | BODY MASS INDEX: 31.72 KG/M2 | HEIGHT: 66 IN | TEMPERATURE: 98 F | OXYGEN SATURATION: 94 % | DIASTOLIC BLOOD PRESSURE: 85 MMHG | HEART RATE: 84 BPM

## 2024-08-06 DIAGNOSIS — M62.838 MUSCLE SPASM: Chronic | ICD-10-CM

## 2024-08-06 DIAGNOSIS — J30.2 SEASONAL ALLERGIES: Chronic | ICD-10-CM

## 2024-08-06 DIAGNOSIS — K21.9 GASTROESOPHAGEAL REFLUX DISEASE WITHOUT ESOPHAGITIS: Chronic | ICD-10-CM

## 2024-08-06 DIAGNOSIS — E78.2 MIXED HYPERLIPIDEMIA: ICD-10-CM

## 2024-08-06 DIAGNOSIS — E87.6 HYPOKALEMIA: Chronic | ICD-10-CM

## 2024-08-06 DIAGNOSIS — I73.9 PERIPHERAL VASCULAR DISEASE: Chronic | ICD-10-CM

## 2024-08-06 DIAGNOSIS — Z12.2 SCREENING FOR LUNG CANCER: ICD-10-CM

## 2024-08-06 DIAGNOSIS — F32.89 OTHER DEPRESSION: Chronic | ICD-10-CM

## 2024-08-06 DIAGNOSIS — E11.9 TYPE 2 DIABETES MELLITUS WITHOUT COMPLICATION, WITHOUT LONG-TERM CURRENT USE OF INSULIN: Primary | ICD-10-CM

## 2024-08-06 DIAGNOSIS — Z12.31 SCREENING MAMMOGRAM FOR BREAST CANCER: ICD-10-CM

## 2024-08-06 DIAGNOSIS — M51.9 LUMBAR DISC DISEASE: Chronic | ICD-10-CM

## 2024-08-06 DIAGNOSIS — I25.118 ATHEROSCLEROTIC HEART DISEASE OF NATIVE CORONARY ARTERY WITH OTHER FORMS OF ANGINA PECTORIS: Chronic | ICD-10-CM

## 2024-08-06 DIAGNOSIS — R11.0 NAUSEA: ICD-10-CM

## 2024-08-06 DIAGNOSIS — K52.9 CHRONIC DIARRHEA: ICD-10-CM

## 2024-08-06 DIAGNOSIS — Z87.891 PERSONAL HISTORY OF NICOTINE DEPENDENCE: ICD-10-CM

## 2024-08-06 DIAGNOSIS — I10 ESSENTIAL HYPERTENSION: ICD-10-CM

## 2024-08-06 PROCEDURE — 85025 COMPLETE CBC W/AUTO DIFF WBC: CPT | Mod: ,,, | Performed by: CLINICAL MEDICAL LABORATORY

## 2024-08-06 PROCEDURE — 82043 UR ALBUMIN QUANTITATIVE: CPT | Mod: ,,, | Performed by: CLINICAL MEDICAL LABORATORY

## 2024-08-06 PROCEDURE — 82570 ASSAY OF URINE CREATININE: CPT | Mod: ,,, | Performed by: CLINICAL MEDICAL LABORATORY

## 2024-08-06 PROCEDURE — 83036 HEMOGLOBIN GLYCOSYLATED A1C: CPT | Mod: ,,, | Performed by: CLINICAL MEDICAL LABORATORY

## 2024-08-06 PROCEDURE — 80061 LIPID PANEL: CPT | Mod: ,,, | Performed by: CLINICAL MEDICAL LABORATORY

## 2024-08-06 PROCEDURE — 80053 COMPREHEN METABOLIC PANEL: CPT | Mod: ,,, | Performed by: CLINICAL MEDICAL LABORATORY

## 2024-08-06 PROCEDURE — 99214 OFFICE O/P EST MOD 30 MIN: CPT | Mod: ,,, | Performed by: FAMILY MEDICINE

## 2024-08-06 RX ORDER — CYCLOBENZAPRINE HCL 10 MG
10 TABLET ORAL 3 TIMES DAILY PRN
Qty: 45 TABLET | Refills: 2 | Status: SHIPPED | OUTPATIENT
Start: 2024-08-06

## 2024-08-06 RX ORDER — VALSARTAN 80 MG/1
80 TABLET ORAL DAILY
Qty: 90 TABLET | Refills: 1 | Status: SHIPPED | OUTPATIENT
Start: 2024-08-06

## 2024-08-06 RX ORDER — PROMETHAZINE HYDROCHLORIDE 25 MG/1
25 TABLET ORAL EVERY 6 HOURS PRN
Qty: 30 TABLET | Refills: 0 | Status: SHIPPED | OUTPATIENT
Start: 2024-08-06

## 2024-08-06 RX ORDER — ESCITALOPRAM OXALATE 20 MG/1
20 TABLET ORAL DAILY
Qty: 90 TABLET | Refills: 1 | Status: SHIPPED | OUTPATIENT
Start: 2024-08-06

## 2024-08-06 RX ORDER — ATORVASTATIN CALCIUM 80 MG/1
80 TABLET, FILM COATED ORAL NIGHTLY
Qty: 90 TABLET | Refills: 1 | Status: SHIPPED | OUTPATIENT
Start: 2024-08-06

## 2024-08-06 RX ORDER — OMEPRAZOLE 20 MG/1
20 CAPSULE, DELAYED RELEASE ORAL DAILY
Qty: 90 CAPSULE | Refills: 1 | Status: SHIPPED | OUTPATIENT
Start: 2024-08-06

## 2024-08-06 RX ORDER — GLIPIZIDE 2.5 MG/1
5 TABLET, EXTENDED RELEASE ORAL
Qty: 90 TABLET | Refills: 1 | Status: SHIPPED | OUTPATIENT
Start: 2024-08-06 | End: 2025-08-06

## 2024-08-06 RX ORDER — FLUTICASONE PROPIONATE 50 MCG
1 SPRAY, SUSPENSION (ML) NASAL DAILY
Qty: 15.8 ML | Refills: 3 | Status: SHIPPED | OUTPATIENT
Start: 2024-08-06

## 2024-08-06 RX ORDER — POTASSIUM CHLORIDE 1500 MG/1
20 TABLET, EXTENDED RELEASE ORAL DAILY
Qty: 90 TABLET | Refills: 1 | Status: SHIPPED | OUTPATIENT
Start: 2024-08-06

## 2024-08-06 RX ORDER — CARVEDILOL 6.25 MG/1
6.25 TABLET ORAL 2 TIMES DAILY
Qty: 180 TABLET | Refills: 1 | Status: SHIPPED | OUTPATIENT
Start: 2024-08-06

## 2024-08-06 RX ORDER — CETIRIZINE HYDROCHLORIDE 10 MG/1
10 TABLET ORAL DAILY
Qty: 90 TABLET | Refills: 1 | Status: SHIPPED | OUTPATIENT
Start: 2024-08-06

## 2024-08-06 RX ORDER — CILOSTAZOL 100 MG/1
100 TABLET ORAL 2 TIMES DAILY
Qty: 180 TABLET | Refills: 1 | Status: SHIPPED | OUTPATIENT
Start: 2024-08-06

## 2024-08-06 RX ORDER — PANTOPRAZOLE SODIUM 40 MG/1
40 TABLET, DELAYED RELEASE ORAL 2 TIMES DAILY
Qty: 28 TABLET | Refills: 0 | Status: CANCELLED | OUTPATIENT
Start: 2024-08-06

## 2024-08-06 RX ORDER — DICYCLOMINE HYDROCHLORIDE 10 MG/1
10 CAPSULE ORAL
Qty: 120 CAPSULE | Refills: 1 | Status: SHIPPED | OUTPATIENT
Start: 2024-08-06

## 2024-08-07 LAB
ALBUMIN SERPL BCP-MCNC: 3.2 G/DL (ref 3.5–5)
ALBUMIN/GLOB SERPL: 1 {RATIO}
ALP SERPL-CCNC: 89 U/L (ref 55–142)
ALT SERPL W P-5'-P-CCNC: 24 U/L (ref 13–56)
ANION GAP SERPL CALCULATED.3IONS-SCNC: 11 MMOL/L (ref 7–16)
AST SERPL W P-5'-P-CCNC: 23 U/L (ref 15–37)
BASOPHILS # BLD AUTO: 0.03 K/UL (ref 0–0.2)
BASOPHILS NFR BLD AUTO: 0.5 % (ref 0–1)
BILIRUB SERPL-MCNC: 0.2 MG/DL (ref ?–1.2)
BUN SERPL-MCNC: 10 MG/DL (ref 7–18)
BUN/CREAT SERPL: 11 (ref 6–20)
CALCIUM SERPL-MCNC: 8.3 MG/DL (ref 8.5–10.1)
CHLORIDE SERPL-SCNC: 108 MMOL/L (ref 98–107)
CHOLEST SERPL-MCNC: 124 MG/DL (ref 0–200)
CHOLEST/HDLC SERPL: 1.8 {RATIO}
CO2 SERPL-SCNC: 26 MMOL/L (ref 21–32)
CREAT SERPL-MCNC: 0.91 MG/DL (ref 0.55–1.02)
CREAT UR-MCNC: 219 MG/DL (ref 28–219)
DIFFERENTIAL METHOD BLD: ABNORMAL
EGFR (NO RACE VARIABLE) (RUSH/TITUS): 67 ML/MIN/1.73M2
EOSINOPHIL # BLD AUTO: 0.18 K/UL (ref 0–0.5)
EOSINOPHIL NFR BLD AUTO: 2.8 % (ref 1–4)
ERYTHROCYTE [DISTWIDTH] IN BLOOD BY AUTOMATED COUNT: 13.9 % (ref 11.5–14.5)
EST. AVERAGE GLUCOSE BLD GHB EST-MCNC: 137 MG/DL
GLOBULIN SER-MCNC: 3.3 G/DL (ref 2–4)
GLUCOSE SERPL-MCNC: 202 MG/DL (ref 74–106)
HBA1C MFR BLD HPLC: 6.4 % (ref 4.5–6.6)
HCT VFR BLD AUTO: 37.5 % (ref 38–47)
HDLC SERPL-MCNC: 70 MG/DL (ref 40–60)
HGB BLD-MCNC: 11.9 G/DL (ref 12–16)
IMM GRANULOCYTES # BLD AUTO: 0.03 K/UL (ref 0–0.04)
IMM GRANULOCYTES NFR BLD: 0.5 % (ref 0–0.4)
LDLC SERPL CALC-MCNC: 32 MG/DL
LYMPHOCYTES # BLD AUTO: 1.98 K/UL (ref 1–4.8)
LYMPHOCYTES NFR BLD AUTO: 30.5 % (ref 27–41)
MCH RBC QN AUTO: 30 PG (ref 27–31)
MCHC RBC AUTO-ENTMCNC: 31.7 G/DL (ref 32–36)
MCV RBC AUTO: 94.5 FL (ref 80–96)
MICROALBUMIN UR-MCNC: 1.3 MG/DL (ref 0–2.8)
MICROALBUMIN/CREAT RATIO PNL UR: 5.9 MG/G (ref 0–30)
MONOCYTES # BLD AUTO: 0.53 K/UL (ref 0–0.8)
MONOCYTES NFR BLD AUTO: 8.2 % (ref 2–6)
MPC BLD CALC-MCNC: 10.9 FL (ref 9.4–12.4)
NEUTROPHILS # BLD AUTO: 3.74 K/UL (ref 1.8–7.7)
NEUTROPHILS NFR BLD AUTO: 57.5 % (ref 53–65)
NONHDLC SERPL-MCNC: 54 MG/DL
NRBC # BLD AUTO: 0 X10E3/UL
NRBC, AUTO (.00): 0 %
PLATELET # BLD AUTO: 198 K/UL (ref 150–400)
POTASSIUM SERPL-SCNC: 2.9 MMOL/L (ref 3.5–5.1)
PROT SERPL-MCNC: 6.5 G/DL (ref 6.4–8.2)
RBC # BLD AUTO: 3.97 M/UL (ref 4.2–5.4)
SODIUM SERPL-SCNC: 142 MMOL/L (ref 136–145)
TRIGL SERPL-MCNC: 112 MG/DL (ref 35–150)
VLDLC SERPL-MCNC: 22 MG/DL
WBC # BLD AUTO: 6.49 K/UL (ref 4.5–11)

## 2024-08-12 ENCOUNTER — TELEPHONE (OUTPATIENT)
Dept: FAMILY MEDICINE | Facility: CLINIC | Age: 74
End: 2024-08-12
Payer: MEDICARE

## 2024-08-12 NOTE — TELEPHONE ENCOUNTER
Contacted pt in regards to BP during her previous office visit. Pt states her blood pressure was better but still elevated during her Cardiology appt. Pt states she does not check her BP at home, but she does have a cuff. Recommended to pt to start checking her BP and recording her readings and return to clinic if BP remains elevated. Pt voiced understanding and had no further questions.    ----- Message from Adriana Montero MD sent at 8/9/2024 10:23 AM CDT -----  BP elevated in clinic. Patient reports normal readings at home. Please call to record home blood pressure reading. If remains elevated or if patient has not been monitoring they will need to RTC for follow up within 2 weeks of phone call. Thanks!

## 2024-08-20 ENCOUNTER — TELEPHONE (OUTPATIENT)
Dept: FAMILY MEDICINE | Facility: CLINIC | Age: 74
End: 2024-08-20
Payer: MEDICARE

## 2024-08-20 DIAGNOSIS — E87.6 LOW BLOOD POTASSIUM: Primary | ICD-10-CM

## 2024-08-20 DIAGNOSIS — E87.6 HYPOKALEMIA: Primary | ICD-10-CM

## 2024-08-20 RX ORDER — POTASSIUM CHLORIDE 20 MEQ/1
20 TABLET, EXTENDED RELEASE ORAL 2 TIMES DAILY
Qty: 30 TABLET | Refills: 0 | Status: SHIPPED | OUTPATIENT
Start: 2024-08-20

## 2024-08-20 NOTE — TELEPHONE ENCOUNTER
Contacted pt in regards to lab results. Informed pt of lab results. Pt voiced understanding and had no further questions.    ----- Message from Adriana Montero MD sent at 8/16/2024 11:48 AM CDT -----  Please call patient regarding lab results. Potassium is low. Recommend potassium supplementation 20 meq PO BID. Repeat BMP (lab only) in 2 weeks.   DM is well controlled.   Labs, otherwise, ok/stable. Thanks!

## 2024-09-06 ENCOUNTER — TELEPHONE (OUTPATIENT)
Dept: FAMILY MEDICINE | Facility: CLINIC | Age: 74
End: 2024-09-06
Payer: MEDICARE

## 2024-09-06 NOTE — TELEPHONE ENCOUNTER
Contacted pt in regards to lab results. Informed pt of lab results. Pt voiced understanding and had no further questions.     ----- Message from Adriana Montero MD sent at 9/4/2024  5:29 PM CDT -----  Please contact the patient and let them know that their results were stable/ok and do not require any change in treatment. Potassium has returned to normal. Continue supplementation. Thanks!

## 2024-09-20 ENCOUNTER — HOSPITAL ENCOUNTER (OUTPATIENT)
Dept: RADIOLOGY | Facility: HOSPITAL | Age: 74
Discharge: HOME | End: 2024-09-20
Attending: FAMILY MEDICINE
Payer: MEDICARE

## 2024-09-20 VITALS — WEIGHT: 185 LBS | HEIGHT: 66 IN | BODY MASS INDEX: 29.73 KG/M2

## 2024-09-20 DIAGNOSIS — Z87.891 PERSONAL HISTORY OF NICOTINE DEPENDENCE: ICD-10-CM

## 2024-09-20 DIAGNOSIS — Z12.2 SCREENING FOR LUNG CANCER: ICD-10-CM

## 2024-09-20 PROCEDURE — 71271 CT THORAX LUNG CANCER SCR C-: CPT | Mod: TC

## 2024-09-20 PROCEDURE — 71271 CT THORAX LUNG CANCER SCR C-: CPT | Mod: 26,,, | Performed by: RADIOLOGY

## 2024-10-10 DIAGNOSIS — Z12.11 COLON CANCER SCREENING: Primary | ICD-10-CM

## 2024-10-10 RX ORDER — POLYETHYLENE GLYCOL 3350, SODIUM SULFATE ANHYDROUS, SODIUM BICARBONATE, SODIUM CHLORIDE, POTASSIUM CHLORIDE 236; 22.74; 6.74; 5.86; 2.97 G/4L; G/4L; G/4L; G/4L; G/4L
4 POWDER, FOR SOLUTION ORAL ONCE
Qty: 4000 ML | Refills: 0 | Status: SHIPPED | OUTPATIENT
Start: 2024-10-10 | End: 2024-10-10

## 2024-10-14 ENCOUNTER — ANESTHESIA EVENT (OUTPATIENT)
Dept: GASTROENTEROLOGY | Facility: HOSPITAL | Age: 74
End: 2024-10-14
Payer: MEDICARE

## 2024-10-14 ENCOUNTER — ANESTHESIA (OUTPATIENT)
Dept: GASTROENTEROLOGY | Facility: HOSPITAL | Age: 74
End: 2024-10-14
Payer: MEDICARE

## 2024-10-14 ENCOUNTER — HOSPITAL ENCOUNTER (OUTPATIENT)
Dept: GASTROENTEROLOGY | Facility: HOSPITAL | Age: 74
Discharge: HOME | End: 2024-10-14
Admitting: INTERNAL MEDICINE
Payer: MEDICARE

## 2024-10-14 VITALS
HEART RATE: 57 BPM | HEIGHT: 66 IN | WEIGHT: 182 LBS | RESPIRATION RATE: 10 BRPM | OXYGEN SATURATION: 98 % | DIASTOLIC BLOOD PRESSURE: 48 MMHG | TEMPERATURE: 98 F | SYSTOLIC BLOOD PRESSURE: 132 MMHG | BODY MASS INDEX: 29.25 KG/M2

## 2024-10-14 DIAGNOSIS — R19.4 CHANGE IN BOWEL HABITS: ICD-10-CM

## 2024-10-14 DIAGNOSIS — K92.1 BLOOD IN STOOL: ICD-10-CM

## 2024-10-14 DIAGNOSIS — K21.9 GASTROESOPHAGEAL REFLUX DISEASE WITHOUT ESOPHAGITIS: Primary | Chronic | ICD-10-CM

## 2024-10-14 DIAGNOSIS — K52.9 CHRONIC DIARRHEA: ICD-10-CM

## 2024-10-14 LAB — GLUCOSE SERPL-MCNC: 126 MG/DL (ref 70–105)

## 2024-10-14 PROCEDURE — 45385 COLONOSCOPY W/LESION REMOVAL: CPT | Performed by: INTERNAL MEDICINE

## 2024-10-14 PROCEDURE — 82962 GLUCOSE BLOOD TEST: CPT

## 2024-10-14 PROCEDURE — D9220A PRA ANESTHESIA: Mod: ,,,

## 2024-10-14 PROCEDURE — 37000009 HC ANESTHESIA EA ADD 15 MINS

## 2024-10-14 PROCEDURE — 37000008 HC ANESTHESIA 1ST 15 MINUTES

## 2024-10-14 PROCEDURE — 45380 COLONOSCOPY AND BIOPSY: CPT | Mod: 59 | Performed by: INTERNAL MEDICINE

## 2024-10-14 PROCEDURE — 63600175 PHARM REV CODE 636 W HCPCS

## 2024-10-14 PROCEDURE — 88305 TISSUE EXAM BY PATHOLOGIST: CPT | Mod: TC,91,SUR | Performed by: INTERNAL MEDICINE

## 2024-10-14 PROCEDURE — 45385 COLONOSCOPY W/LESION REMOVAL: CPT | Mod: ,,, | Performed by: INTERNAL MEDICINE

## 2024-10-14 PROCEDURE — 45380 COLONOSCOPY AND BIOPSY: CPT | Mod: 59,,, | Performed by: INTERNAL MEDICINE

## 2024-10-14 PROCEDURE — 27201423 OPTIME MED/SURG SUP & DEVICES STERILE SUPPLY

## 2024-10-14 RX ORDER — LIDOCAINE HYDROCHLORIDE 20 MG/ML
INJECTION, SOLUTION EPIDURAL; INFILTRATION; INTRACAUDAL; PERINEURAL
Status: DISCONTINUED | OUTPATIENT
Start: 2024-10-14 | End: 2024-10-14

## 2024-10-14 RX ORDER — PROPOFOL 10 MG/ML
VIAL (ML) INTRAVENOUS
Status: DISCONTINUED | OUTPATIENT
Start: 2024-10-14 | End: 2024-10-14

## 2024-10-14 RX ORDER — SODIUM CHLORIDE 0.9 % (FLUSH) 0.9 %
10 SYRINGE (ML) INJECTION
Status: DISCONTINUED | OUTPATIENT
Start: 2024-10-14 | End: 2024-10-15 | Stop reason: HOSPADM

## 2024-10-14 RX ADMIN — PROPOFOL 50 MG: 10 INJECTION, EMULSION INTRAVENOUS at 08:10

## 2024-10-14 RX ADMIN — PROPOFOL 100 MG: 10 INJECTION, EMULSION INTRAVENOUS at 08:10

## 2024-10-14 RX ADMIN — PROPOFOL 50 MG: 10 INJECTION, EMULSION INTRAVENOUS at 09:10

## 2024-10-14 RX ADMIN — LIDOCAINE HYDROCHLORIDE 80 MG: 20 INJECTION, SOLUTION INTRAVENOUS at 08:10

## 2024-10-14 NOTE — DISCHARGE INSTRUCTIONS
Procedure Date  10/14/24     Impression  Overall Impression:   2 subcentimeter polyps in the ascending colon were removed with cold snare  Performed pancolonic forceps biopsies to rule out microscopic colitis  The terminal ileum, ileocecal valve, appendiceal orifice, cecum, transverse colon, descending colon and sigmoid colon appeared normal.  (grade 2) hemorrhoids        Recommendation  Await pathology results  Continue daily fiber therapy  If biopsies are negative and diarrhea persists, recommend trial of Colestid  Elastase previously ordered in clinic but never collected - recommend collection at follow up  If the above fails/is normal, consider Elavil or Xifaxan in the future   Schedule EGD for patient reporting GERD symptoms refractory to PPI therapy    Repeat colonoscopy in 5 years      Outcome of procedure: successful Colonoscopy  Disposition: patient to recovery following procedure; discharge to home when appropriate parameters met  Provisions for follow up: please call my office for any unexpected symptoms like chest or abdominal pain or bleeding following your procedure.  Final Diagnosis: colon polyps    NO DRIVING, OPERATING EQUIPMENT, OR SIGNING LEGAL DOCUMENTS FOR 24 HOURS.THE NURSE WILL CALL YOU WITH YOUR BIOPSY RESULTS IN A FEW DAYS. IF YOU HAVE  [facility] MYCHART YOUR RESULTS WILL APPEAR THERE AS WELL.Please call the GI Lab if you have any nausea, vomiting, or abdominal pain.   General Sunscreen Counseling: I recommended a broad spectrum sunscreen with a SPF of 30 or higher.  I explained that SPF 30 sunscreens block approximately 97 percent of the sun's harmful rays.  Sunscreens should be applied at least 15 minutes prior to expected sun exposure and then every 2 hours after that as long as sun exposure continues. If swimming or exercising sunscreen should be reapplied every 45 minutes to an hour after getting wet or sweating.  One ounce, or the equivalent of a shot glass full of sunscreen, is adequate to protect the skin not covered by a bathing suit. I also recommended a lip balm with a sunscreen as well. Sun protective clothing can be used in lieu of sunscreen but must be worn the entire time you are exposed to the sun's rays. Detail Level: Detailed

## 2024-10-14 NOTE — ANESTHESIA POSTPROCEDURE EVALUATION
Anesthesia Post Evaluation    Patient: Maddie Ya    Procedure(s) Performed: colonoscopy    Final Anesthesia Type: MAC      Patient location during evaluation: GI PACU  Patient participation: Yes- Able to Participate  Level of consciousness: awake and alert  Post-procedure vital signs: reviewed and stable  Pain management: adequate  Airway patency: patent    PONV status at discharge: No PONV  Anesthetic complications: no      Cardiovascular status: blood pressure returned to baseline  Respiratory status: unassisted and spontaneous ventilation  Hydration status: euvolemic  Follow-up not needed.  Comments: Refer to nursing note for pain and dari score upon discharge from recovery              Vitals Value Taken Time   /51 10/14/24 0946   Temp 36.6 °C (97.8 °F) 10/14/24 0918   Pulse 55 10/14/24 0946   Resp 8 10/14/24 0946   SpO2 99 % 10/14/24 0946   Vitals shown include unfiled device data.      No case tracking events are documented in the log.      Pain/Dari Score: Dari Score: 10 (10/14/2024  9:27 AM)

## 2024-10-14 NOTE — TRANSFER OF CARE
"Anesthesia Transfer of Care Note    Patient: Maddie Ya    Procedure(s) Performed: colonoscopy    Patient location: GI    Anesthesia Type: general and MAC    Transport from OR: Transported from OR on room air with adequate spontaneous ventilation    Post pain: adequate analgesia    Post assessment: no apparent anesthetic complications    Post vital signs: stable    Level of consciousness: awake, alert and oriented    Nausea/Vomiting: no nausea/vomiting    Complications: none    Transfer of care protocol was followedComments: Refer to nursing note for pain dari score upon discharge from recovery      Last vitals: Visit Vitals  BP (!) 111/44   Pulse 66   Temp 36.6 °C (97.8 °F)   Resp 14   Ht 5' 6" (1.676 m)   Wt 82.6 kg (182 lb)   SpO2 99%   Breastfeeding No   BMI 29.38 kg/m²     "

## 2024-10-14 NOTE — ANESTHESIA PREPROCEDURE EVALUATION
10/14/2024  Maddie Ya is a 73 y.o., female.      Pre-op Assessment    I have reviewed the Patient Summary Reports.     I have reviewed the Nursing Notes. I have reviewed the NPO Status.   I have reviewed the Medications.     Review of Systems  Anesthesia Hx:  No problems with previous Anesthesia                Social:  Former Smoker       Cardiovascular:     Hypertension  Past MI CAD   CABG/stent   Angina CHF   PVD hyperlipidemia    Ischemic cardiomyopathy                         Pulmonary:   COPD, mild                     Hepatic/GI:     GERD             Musculoskeletal:  Arthritis               Neurological:   CVA                                    Endocrine:  Diabetes           Psych:  Psychiatric History  depression                Physical Exam  General: Well nourished, Cooperative, Alert and Oriented    Airway:  Mallampati: II   Mouth Opening: Normal  TM Distance: Normal  Tongue: Normal  Neck ROM: Normal ROM    Dental:  Intact    Chest/Lungs:  Clear to auscultation, Normal Respiratory Rate    Heart:  Rate: Normal  Rhythm: Regular Rhythm  Sounds: Normal    Abdomen:  Normal, Soft, Nontender        Anesthesia Plan  Type of Anesthesia, risks & benefits discussed:    Anesthesia Type: Gen Natural Airway, MAC  Intra-op Monitoring Plan: Standard ASA Monitors  Post Op Pain Control Plan: multimodal analgesia and IV/PO Opioids PRN  Induction:  IV  Informed Consent: Informed consent signed with the Patient and all parties understand the risks and agree with anesthesia plan.  All questions answered.   ASA Score: 4  Day of Surgery Review of History & Physical: I have interviewed and examined the patient. I have reviewed the patient's H&P dated:     Ready For Surgery From Anesthesia Perspective.     .

## 2024-10-14 NOTE — H&P
Gastroenterology Pre-procedure H&P    History of Present Illness    Maddie Ya is a 73 y.o. female that  has a past medical history of Adenomatous polyp of ascending colon (06/21/2022), Adenomatous polyp of transverse colon (06/21/2022), B12 deficiency, Bilateral carotid bruits, CAD (coronary artery disease), CHF (congestive heart failure), CVA (cerebral vascular accident), Depression, Diabetes mellitus, type 2, Diverticula, colon (06/21/2022), GERD (gastroesophageal reflux disease), Heart attack, History of colon polyps, Hyperlipidemia, Hypertension, Iron deficiency anemia due to chronic blood loss (06/21/2022), Ischemic cardiomyopathy, Lumbar disc disease, OA (osteoarthritis), and PAD (peripheral artery disease).     Patient with h/o polyps and chronic diarrhea here for diagnostic colonoscopy      Past Medical History:   Diagnosis Date    Adenomatous polyp of ascending colon 06/21/2022    Adenomatous polyp of transverse colon 06/21/2022    B12 deficiency     Bilateral carotid bruits     CAD (coronary artery disease)     sees Halina    CHF (congestive heart failure)     CVA (cerebral vascular accident)     Depression     Diabetes mellitus, type 2     Diverticula, colon 06/21/2022    GERD (gastroesophageal reflux disease)     Heart attack     History of colon polyps     Hyperlipidemia     Hypertension     Iron deficiency anemia due to chronic blood loss 06/21/2022    Ischemic cardiomyopathy     Lumbar disc disease     OA (osteoarthritis)     PAD (peripheral artery disease)        Past Surgical History:   Procedure Laterality Date    ANKLE SURGERY      APPENDECTOMY      BACK SURGERY      CARDIAC CATHETERIZATION      CHOLECYSTECTOMY      COLONOSCOPY  06/23/2022    hx colon polyps    CORNEAL TRANSPLANT Bilateral     CORONARY ANGIOPLASTY WITH STENT PLACEMENT      HYSTERECTOMY      TONSILLECTOMY      TOTAL ABDOMINAL HYSTERECTOMY W/ BILATERAL SALPINGOOPHORECTOMY         Family History   Problem Relation Name Age of  Onset    Osteoarthritis Mother      Diabetes Mother      Heart failure Mother      Hyperlipidemia Mother      Hypertension Mother      Cancer Father      Diabetes Father      Hypertension Father      Heart attack Father      Lung cancer Father      Cancer Brother      Multiple myeloma Brother      Autoimmune disease Daughter      Thyroid disease Daughter      Hypertension Daughter         Social History     Socioeconomic History    Marital status:    Tobacco Use    Smoking status: Former     Current packs/day: 0.00     Average packs/day: 0.5 packs/day for 51.4 years (25.7 ttl pk-yrs)     Types: Cigarettes     Start date:      Quit date: 5/15/2024     Years since quittin.4     Passive exposure: Current    Smokeless tobacco: Never    Tobacco comments:     Off and on smoker approximately 50 years    Substance and Sexual Activity    Alcohol use: Never    Drug use: Never    Sexual activity: Not Currently     Social Drivers of Health     Financial Resource Strain: Low Risk  (2024)    Overall Financial Resource Strain (CARDIA)     Difficulty of Paying Living Expenses: Not hard at all   Food Insecurity: No Food Insecurity (2024)    Hunger Vital Sign     Worried About Running Out of Food in the Last Year: Never true     Ran Out of Food in the Last Year: Never true   Transportation Needs: No Transportation Needs (2024)    PRAPARE - Transportation     Lack of Transportation (Medical): No     Lack of Transportation (Non-Medical): No   Physical Activity: Inactive (2024)    Exercise Vital Sign     Days of Exercise per Week: 0 days     Minutes of Exercise per Session: 0 min   Stress: Stress Concern Present (2024)    [employer] - Occupational Stress Questionnaire     Feeling of Stress : To some extent   Housing Stability: Low Risk  (2024)    Housing Stability Vital Sign     Unable to Pay for Housing in the Last Year: No     Number of Places Lived in the Last  Year: 1     Unstable Housing in the Last Year: No       Current Outpatient Medications   Medication Sig Dispense Refill    cilostazoL (PLETAL) 100 MG Tab Take 1 tablet (100 mg total) by mouth 2 (two) times daily. 180 tablet 1    aspirin (ECOTRIN) 81 MG EC tablet Take 1 tablet by mouth Daily.      atorvastatin (LIPITOR) 80 MG tablet Take 1 tablet (80 mg total) by mouth every evening. 90 tablet 1    carvediloL (COREG) 6.25 MG tablet Take 1 tablet (6.25 mg total) by mouth 2 (two) times daily. 180 tablet 1    cetirizine (ZYRTEC) 10 MG tablet Take 1 tablet (10 mg total) by mouth once daily. 90 tablet 1    cyanocobalamin 1,000 mcg/mL injection Once a month for 6 months. 1 mL 6    cyclobenzaprine (FLEXERIL) 10 MG tablet Take 1 tablet (10 mg total) by mouth 3 (three) times daily as needed for Muscle spasms. 45 tablet 2    dicyclomine (BENTYL) 10 MG capsule Take 1 capsule (10 mg total) by mouth 4 (four) times daily before meals and nightly. As needed for diarrhea 120 capsule 1    EScitalopram oxalate (LEXAPRO) 20 MG tablet Take 1 tablet (20 mg total) by mouth once daily. 90 tablet 1    FLAREX 0.1 % DrpS Place 1 drop into both eyes Daily.      fluticasone propionate (FLONASE) 50 mcg/actuation nasal spray 1 spray (50 mcg total) by Each Nostril route once daily. 15.8 mL 3    furosemide (LASIX) 40 MG tablet Take 1 tablet (40 mg total) by mouth daily as needed (swelling). 90 tablet 1    glipiZIDE (GLUCOTROL) 2.5 MG TR24 Take 2 tablets (5 mg total) by mouth daily with breakfast. 90 tablet 1    omeprazole (PRILOSEC) 20 MG capsule Take 1 capsule (20 mg total) by mouth once daily. 90 capsule 1    pantoprazole (PROTONIX) 40 MG tablet Take 1 tablet (40 mg total) by mouth 2 (two) times daily. Hold omeprazole until finished with this med THEN resume omeprazole. 28 tablet 0    potassium chloride (K-TAB) 20 mEq Take 1 tablet (20 mEq total) by mouth once daily. 90 tablet 1    potassium chloride SA (K-DUR,KLOR-CON) 20 MEQ tablet Take 1  "tablet (20 mEq total) by mouth 2 (two) times daily. 30 tablet 0    promethazine (PHENERGAN) 25 MG tablet Take 1 tablet (25 mg total) by mouth every 6 (six) hours as needed for Nausea (may cause drowsiness). 30 tablet 0    spironolactone (ALDACTONE) 25 MG tablet Take 1 tablet (25 mg total) by mouth daily as needed (swelling). 90 tablet 1    spironolactone (ALDACTONE) 25 MG tablet 1 tablet.      TRUE METRIX GLUCOSE TEST STRIP Strp Inject 1-2 Sticks into the skin once daily at 6am.      valsartan (DIOVAN) 80 MG tablet Take 1 tablet (80 mg total) by mouth once daily. 90 tablet 1    vit C/E/Zn/coppr/lutein/zeaxan (PRESERVISION AREDS-2 ORAL) Take by mouth.       No current facility-administered medications for this encounter.       Review of patient's allergies indicates:   Allergen Reactions    Hydromorphone Itching    Tuberculin, purified protein derivative        Objective:  Vitals:    10/14/24 0744   BP: (!) 151/59   Pulse: 65   Resp: 12   Temp: 98.2 °F (36.8 °C)   TempSrc: Oral   SpO2: 97%   Weight: 82.6 kg (182 lb)   Height: 5' 6" (1.676 m)        GEN: normal appearing, NAD, AAO x3  HENT: NCAT, anicteric, OP benign  CV: normal rate, regular rhythm  RESP: NABS, symmetric rise, unlabored  ABD: soft, ND, no guarding or TTP  SKIN: warm and dry  NEURO: grossly afocal    Assessment and Plan:    Proceed with:    Colonoscopy for chronic diarrhea of months long duration    Nick Steel MD  Gastroenterology  "

## 2024-10-16 DIAGNOSIS — K52.9 CHRONIC DIARRHEA: Primary | ICD-10-CM

## 2024-10-16 RX ORDER — MONTELUKAST SODIUM 4 MG/1
1 TABLET, CHEWABLE ORAL 2 TIMES DAILY
Qty: 180 TABLET | Refills: 3 | Status: SHIPPED | OUTPATIENT
Start: 2024-10-16 | End: 2025-10-16

## 2024-10-16 NOTE — PROGRESS NOTES
Dr. Montero, thank you for referring this patient to us. I recommend repeat colonoscopy in 5 years. We are going to try a trial of Colestid for her diarrhea. Please let me know if you have any questions regarding this patient.

## 2024-11-04 LAB
LEFT EYE DM RETINOPATHY: NEGATIVE
RIGHT EYE DM RETINOPATHY: NEGATIVE

## 2024-12-05 ENCOUNTER — OFFICE VISIT (OUTPATIENT)
Dept: FAMILY MEDICINE | Facility: CLINIC | Age: 74
End: 2024-12-05
Payer: MEDICARE

## 2024-12-05 VITALS
DIASTOLIC BLOOD PRESSURE: 80 MMHG | HEART RATE: 92 BPM | WEIGHT: 201.63 LBS | BODY MASS INDEX: 32.41 KG/M2 | RESPIRATION RATE: 20 BRPM | TEMPERATURE: 98 F | SYSTOLIC BLOOD PRESSURE: 130 MMHG | OXYGEN SATURATION: 98 % | HEIGHT: 66 IN

## 2024-12-05 DIAGNOSIS — J30.2 SEASONAL ALLERGIES: ICD-10-CM

## 2024-12-05 DIAGNOSIS — E78.2 MIXED HYPERLIPIDEMIA: ICD-10-CM

## 2024-12-05 DIAGNOSIS — M51.9 LUMBAR DISC DISEASE: Chronic | ICD-10-CM

## 2024-12-05 DIAGNOSIS — M62.838 MUSCLE SPASM: Chronic | ICD-10-CM

## 2024-12-05 DIAGNOSIS — E87.6 HYPOKALEMIA: Chronic | ICD-10-CM

## 2024-12-05 DIAGNOSIS — E11.9 TYPE 2 DIABETES MELLITUS WITHOUT COMPLICATION, WITHOUT LONG-TERM CURRENT USE OF INSULIN: Primary | ICD-10-CM

## 2024-12-05 DIAGNOSIS — D50.0 IRON DEFICIENCY ANEMIA DUE TO CHRONIC BLOOD LOSS: ICD-10-CM

## 2024-12-05 DIAGNOSIS — I10 ESSENTIAL HYPERTENSION: ICD-10-CM

## 2024-12-05 DIAGNOSIS — R11.0 NAUSEA: ICD-10-CM

## 2024-12-05 DIAGNOSIS — J01.00 ACUTE NON-RECURRENT MAXILLARY SINUSITIS: ICD-10-CM

## 2024-12-05 DIAGNOSIS — R35.0 URINARY FREQUENCY: ICD-10-CM

## 2024-12-05 LAB
ALBUMIN SERPL BCP-MCNC: 3.6 G/DL (ref 3.4–4.8)
ALBUMIN/GLOB SERPL: 1 {RATIO}
ALP SERPL-CCNC: 92 U/L (ref 40–150)
ALT SERPL W P-5'-P-CCNC: 17 U/L
ANION GAP SERPL CALCULATED.3IONS-SCNC: 11 MMOL/L (ref 7–16)
AST SERPL W P-5'-P-CCNC: 23 U/L (ref 5–34)
BACTERIA #/AREA URNS HPF: ABNORMAL /HPF
BASOPHILS # BLD AUTO: 0.02 K/UL (ref 0–0.2)
BASOPHILS NFR BLD AUTO: 0.3 % (ref 0–1)
BILIRUB SERPL-MCNC: 0.3 MG/DL
BILIRUB UR QL STRIP: NEGATIVE
BUN SERPL-MCNC: 13 MG/DL (ref 10–20)
BUN/CREAT SERPL: 13 (ref 6–20)
CALCIUM SERPL-MCNC: 9.3 MG/DL (ref 8.4–10.2)
CAOX CRY UR QL COMP ASSIST: ABNORMAL
CHLORIDE SERPL-SCNC: 109 MMOL/L (ref 98–107)
CHOLEST SERPL-MCNC: 159 MG/DL
CHOLEST/HDLC SERPL: 2.3 {RATIO}
CLARITY UR: CLEAR
CO2 SERPL-SCNC: 25 MMOL/L (ref 23–31)
COLOR UR: ABNORMAL
CREAT SERPL-MCNC: 1.04 MG/DL (ref 0.55–1.02)
CREAT UR-MCNC: 196 MG/DL (ref 15–325)
DIFFERENTIAL METHOD BLD: ABNORMAL
EGFR (NO RACE VARIABLE) (RUSH/TITUS): 57 ML/MIN/1.73M2
EOSINOPHIL # BLD AUTO: 0.18 K/UL (ref 0–0.5)
EOSINOPHIL NFR BLD AUTO: 2.5 % (ref 1–4)
ERYTHROCYTE [DISTWIDTH] IN BLOOD BY AUTOMATED COUNT: 13.1 % (ref 11.5–14.5)
EST. AVERAGE GLUCOSE BLD GHB EST-MCNC: 148 MG/DL
FERRITIN SERPL-MCNC: 42 NG/ML (ref 5–204)
GLOBULIN SER-MCNC: 3.6 G/DL (ref 2–4)
GLUCOSE SERPL-MCNC: 125 MG/DL (ref 82–115)
GLUCOSE UR STRIP-MCNC: NORMAL MG/DL
HBA1C MFR BLD HPLC: 6.8 %
HCT VFR BLD AUTO: 39.5 % (ref 38–47)
HDLC SERPL-MCNC: 69 MG/DL (ref 35–60)
HGB BLD-MCNC: 12.6 G/DL (ref 12–16)
IMM GRANULOCYTES # BLD AUTO: 0.03 K/UL (ref 0–0.04)
IMM GRANULOCYTES NFR BLD: 0.4 % (ref 0–0.4)
IRON SATN MFR SERPL: 20 % (ref 20–50)
IRON SERPL-MCNC: 65 UG/DL (ref 50–170)
KETONES UR STRIP-SCNC: NEGATIVE MG/DL
LDLC SERPL CALC-MCNC: 57 MG/DL
LEUKOCYTE ESTERASE UR QL STRIP: ABNORMAL
LYMPHOCYTES # BLD AUTO: 2.14 K/UL (ref 1–4.8)
LYMPHOCYTES NFR BLD AUTO: 30.1 % (ref 27–41)
MCH RBC QN AUTO: 29.8 PG (ref 27–31)
MCHC RBC AUTO-ENTMCNC: 31.9 G/DL (ref 32–36)
MCV RBC AUTO: 93.4 FL (ref 80–96)
MICROALBUMIN UR-MCNC: 0.8 MG/DL
MICROALBUMIN/CREAT RATIO PNL UR: 4.1 MG/G (ref 0–30)
MONOCYTES # BLD AUTO: 0.72 K/UL (ref 0–0.8)
MONOCYTES NFR BLD AUTO: 10.1 % (ref 2–6)
MPC BLD CALC-MCNC: 9.8 FL (ref 9.4–12.4)
MUCOUS, UA: ABNORMAL /LPF
NEUTROPHILS # BLD AUTO: 4.02 K/UL (ref 1.8–7.7)
NEUTROPHILS NFR BLD AUTO: 56.6 % (ref 53–65)
NITRITE UR QL STRIP: NEGATIVE
NONHDLC SERPL-MCNC: 90 MG/DL
NRBC # BLD AUTO: 0 X10E3/UL
NRBC, AUTO (.00): 0 %
PH UR STRIP: 5.5 PH UNITS
PLATELET # BLD AUTO: 212 K/UL (ref 150–400)
POTASSIUM SERPL-SCNC: 4 MMOL/L (ref 3.5–5.1)
PROT SERPL-MCNC: 7.2 G/DL (ref 5.8–7.6)
PROT UR QL STRIP: 10
RBC # BLD AUTO: 4.23 M/UL (ref 4.2–5.4)
RBC # UR STRIP: NEGATIVE /UL
RBC #/AREA URNS HPF: 2 /HPF
SODIUM SERPL-SCNC: 141 MMOL/L (ref 136–145)
SP GR UR STRIP: 1.02
SQUAMOUS #/AREA URNS LPF: ABNORMAL /HPF
TIBC SERPL-MCNC: 259 UG/DL (ref 70–310)
TIBC SERPL-MCNC: 324 UG/DL (ref 250–450)
TRANSFERRIN SERPL-MCNC: 294 MG/DL (ref 173–360)
TRIGL SERPL-MCNC: 167 MG/DL (ref 37–140)
UROBILINOGEN UR STRIP-ACNC: NORMAL MG/DL
VLDLC SERPL-MCNC: 33 MG/DL
WBC # BLD AUTO: 7.11 K/UL (ref 4.5–11)
WBC #/AREA URNS HPF: 2 /HPF

## 2024-12-05 PROCEDURE — 80061 LIPID PANEL: CPT | Mod: ,,, | Performed by: CLINICAL MEDICAL LABORATORY

## 2024-12-05 PROCEDURE — 83540 ASSAY OF IRON: CPT | Mod: ,,, | Performed by: CLINICAL MEDICAL LABORATORY

## 2024-12-05 PROCEDURE — 80053 COMPREHEN METABOLIC PANEL: CPT | Mod: ,,, | Performed by: CLINICAL MEDICAL LABORATORY

## 2024-12-05 PROCEDURE — 83036 HEMOGLOBIN GLYCOSYLATED A1C: CPT | Mod: ,,, | Performed by: CLINICAL MEDICAL LABORATORY

## 2024-12-05 PROCEDURE — 82728 ASSAY OF FERRITIN: CPT | Mod: ,,, | Performed by: CLINICAL MEDICAL LABORATORY

## 2024-12-05 PROCEDURE — 82043 UR ALBUMIN QUANTITATIVE: CPT | Mod: ,,, | Performed by: CLINICAL MEDICAL LABORATORY

## 2024-12-05 PROCEDURE — 82570 ASSAY OF URINE CREATININE: CPT | Mod: ,,, | Performed by: CLINICAL MEDICAL LABORATORY

## 2024-12-05 PROCEDURE — 85025 COMPLETE CBC W/AUTO DIFF WBC: CPT | Mod: ,,, | Performed by: CLINICAL MEDICAL LABORATORY

## 2024-12-05 PROCEDURE — 83550 IRON BINDING TEST: CPT | Mod: ,,, | Performed by: CLINICAL MEDICAL LABORATORY

## 2024-12-05 PROCEDURE — 81001 URINALYSIS AUTO W/SCOPE: CPT | Mod: ,,, | Performed by: CLINICAL MEDICAL LABORATORY

## 2024-12-05 RX ORDER — FLUCONAZOLE 150 MG/1
TABLET ORAL
Qty: 3 TABLET | Refills: 0 | Status: SHIPPED | OUTPATIENT
Start: 2024-12-05

## 2024-12-05 RX ORDER — METHYLPREDNISOLONE ACETATE 40 MG/ML
40 INJECTION, SUSPENSION INTRA-ARTICULAR; INTRALESIONAL; INTRAMUSCULAR; SOFT TISSUE
Status: COMPLETED | OUTPATIENT
Start: 2024-12-05 | End: 2024-12-05

## 2024-12-05 RX ORDER — MONTELUKAST SODIUM 10 MG/1
10 TABLET ORAL NIGHTLY
Qty: 90 TABLET | Refills: 1 | Status: SHIPPED | OUTPATIENT
Start: 2024-12-05

## 2024-12-05 RX ORDER — CHLORPHENIRAMINE MALEATE, DEXTROMETHORPHAN HYDROBROMIDE, AND PHENYLEPHRINE HYDROCHLORIDE 4; 10; 10 MG/1; MG/1; MG/1
1 TABLET, COATED ORAL EVERY 8 HOURS PRN
Qty: 30 TABLET | Refills: 0 | Status: SHIPPED | OUTPATIENT
Start: 2024-12-05

## 2024-12-05 RX ORDER — AMOXICILLIN 875 MG/1
875 TABLET, FILM COATED ORAL 2 TIMES DAILY
Qty: 20 TABLET | Refills: 0 | Status: SHIPPED | OUTPATIENT
Start: 2024-12-05 | End: 2024-12-15

## 2024-12-05 RX ORDER — PROMETHAZINE HYDROCHLORIDE 25 MG/1
25 TABLET ORAL EVERY 6 HOURS PRN
Qty: 30 TABLET | Refills: 2 | Status: SHIPPED | OUTPATIENT
Start: 2024-12-05

## 2024-12-05 RX ORDER — DEXAMETHASONE SODIUM PHOSPHATE 4 MG/ML
4 INJECTION, SOLUTION INTRA-ARTICULAR; INTRALESIONAL; INTRAMUSCULAR; INTRAVENOUS; SOFT TISSUE
Status: COMPLETED | OUTPATIENT
Start: 2024-12-05 | End: 2024-12-05

## 2024-12-05 RX ORDER — CYCLOBENZAPRINE HCL 10 MG
10 TABLET ORAL 3 TIMES DAILY PRN
Qty: 45 TABLET | Refills: 2 | Status: SHIPPED | OUTPATIENT
Start: 2024-12-05

## 2024-12-05 RX ADMIN — DEXAMETHASONE SODIUM PHOSPHATE 4 MG: 4 INJECTION, SOLUTION INTRA-ARTICULAR; INTRALESIONAL; INTRAMUSCULAR; INTRAVENOUS; SOFT TISSUE at 02:12

## 2024-12-05 RX ADMIN — METHYLPREDNISOLONE ACETATE 40 MG: 40 INJECTION, SUSPENSION INTRA-ARTICULAR; INTRALESIONAL; INTRAMUSCULAR; SOFT TISSUE at 02:12

## 2024-12-05 NOTE — PROGRESS NOTES
Clinic Note    Patient Name: Maddie Ya  : 1950  MRN: [id number]    Chief Complaint   Patient presents with    Diabetes     Four months follow up    Health Maintenance     TETANUS VACCINE Never done  Shingles Vaccine(1 of 2) Never done  RSV Vaccine (Age 60+ and Pregnant patients)(1 - Risk 60-74 years 1-dose series) Never done  Mammogram due on 2024  Eye Exam due on 2024  Influenza Vaccine(1) due on 2024  COVID-19 Vaccine( season) due on 2024  Foot Exam due on 2025        HPI:    Ms. Maddie Ya is a 74 y.o. female who presents to clinic today with CC of follow up on chronic disease processes including Type II DM, HTH, HLD, GERD, CAD, CHF, vitamin B12 deficiency, OA, depression, h/o CVA, ischemic cardiomyopathy, PVD, COPD, and iron deficiency anemia.   Patient reports chronic issues are well controlled on current medication regimen.  Denies problems or side effects with medications.  Patient is, otherwise, without complaints.     Medications:  Medication List with Changes/Refills   New Medications    AMOXICILLIN (AMOXIL) 875 MG TABLET    Take 1 tablet (875 mg total) by mouth 2 (two) times daily. for 10 days    CHLORPHENIRAMINE-PHENYLEPH-DM (ED A-HIST DM) 4-10-10 MG TAB    Take 1 tablet by mouth every 8 (eight) hours as needed (congestion or cough).    FLUCONAZOLE (DIFLUCAN) 150 MG TAB    Take one tablet by mouth every 72 hours X 3 doses if needed for yeast infection after completion of antibiotics.    MONTELUKAST (SINGULAIR) 10 MG TABLET    Take 1 tablet (10 mg total) by mouth every evening.   Current Medications    ASPIRIN (ECOTRIN) 81 MG EC TABLET    Take 1 tablet by mouth Daily.    ATORVASTATIN (LIPITOR) 80 MG TABLET    Take 1 tablet (80 mg total) by mouth every evening.    CARVEDILOL (COREG) 6.25 MG TABLET    Take 1 tablet (6.25 mg total) by mouth 2 (two) times daily.    CETIRIZINE (ZYRTEC) 10 MG TABLET    Take 1 tablet (10 mg total) by mouth once daily.     CILOSTAZOL (PLETAL) 100 MG TAB    Take 1 tablet (100 mg total) by mouth 2 (two) times daily.    COLESTIPOL (COLESTID) 1 GRAM TAB    Take 1 tablet (1 g total) by mouth 2 (two) times daily.    CYANOCOBALAMIN 1,000 MCG/ML INJECTION    Once a month for 6 months.    DICYCLOMINE (BENTYL) 10 MG CAPSULE    Take 1 capsule (10 mg total) by mouth 4 (four) times daily before meals and nightly. As needed for diarrhea    ESCITALOPRAM OXALATE (LEXAPRO) 20 MG TABLET    Take 1 tablet (20 mg total) by mouth once daily.    FLAREX 0.1 % DRPS    Place 1 drop into both eyes Daily.    FLUTICASONE PROPIONATE (FLONASE) 50 MCG/ACTUATION NASAL SPRAY    1 spray (50 mcg total) by Each Nostril route once daily.    FUROSEMIDE (LASIX) 40 MG TABLET    Take 1 tablet (40 mg total) by mouth daily as needed (swelling).    GLIPIZIDE (GLUCOTROL) 2.5 MG TR24    Take 2 tablets (5 mg total) by mouth daily with breakfast.    OMEPRAZOLE (PRILOSEC) 20 MG CAPSULE    Take 1 capsule (20 mg total) by mouth once daily.    PANTOPRAZOLE (PROTONIX) 40 MG TABLET    Take 1 tablet (40 mg total) by mouth 2 (two) times daily. Hold omeprazole until finished with this med THEN resume omeprazole.    POTASSIUM CHLORIDE (K-TAB) 20 MEQ    Take 1 tablet (20 mEq total) by mouth once daily.    POTASSIUM CHLORIDE SA (K-DUR,KLOR-CON) 20 MEQ TABLET    Take 1 tablet (20 mEq total) by mouth 2 (two) times daily.    SPIRONOLACTONE (ALDACTONE) 25 MG TABLET    Take 1 tablet (25 mg total) by mouth daily as needed (swelling).    SPIRONOLACTONE (ALDACTONE) 25 MG TABLET    1 tablet.    TRUE METRIX GLUCOSE TEST STRIP STRP    Inject 1-2 Sticks into the skin once daily at 6am.    VALSARTAN (DIOVAN) 80 MG TABLET    Take 1 tablet (80 mg total) by mouth once daily.    VIT C/E/ZN/COPPR/LUTEIN/ZEAXAN (PRESERVISION AREDS-2 ORAL)    Take by mouth.   Changed and/or Refilled Medications    Modified Medication Previous Medication    CYCLOBENZAPRINE (FLEXERIL) 10 MG TABLET cyclobenzaprine (FLEXERIL) 10 MG  tablet       Take 1 tablet (10 mg total) by mouth 3 (three) times daily as needed for Muscle spasms.    Take 1 tablet (10 mg total) by mouth 3 (three) times daily as needed for Muscle spasms.    PROMETHAZINE (PHENERGAN) 25 MG TABLET promethazine (PHENERGAN) 25 MG tablet       Take 1 tablet (25 mg total) by mouth every 6 (six) hours as needed for Nausea (may cause drowsiness).    Take 1 tablet (25 mg total) by mouth every 6 (six) hours as needed for Nausea (may cause drowsiness).        Allergies: Hydromorphone and Tuberculin, purified protein derivative      Past Medical History:    Past Medical History:   Diagnosis Date    Adenomatous polyp of ascending colon 06/21/2022    Adenomatous polyp of transverse colon 06/21/2022    B12 deficiency     Bilateral carotid bruits     CAD (coronary artery disease)     sees Halina    CHF (congestive heart failure)     CVA (cerebral vascular accident)     Depression     Diabetes mellitus, type 2     Diverticula, colon 06/21/2022    GERD (gastroesophageal reflux disease)     Heart attack     History of colon polyps     Hyperlipidemia     Hypertension     Iron deficiency anemia due to chronic blood loss 06/21/2022    Ischemic cardiomyopathy     Lumbar disc disease     OA (osteoarthritis)     PAD (peripheral artery disease)        Past Surgical History:    Past Surgical History:   Procedure Laterality Date    ANKLE SURGERY      APPENDECTOMY      BACK SURGERY      CARDIAC CATHETERIZATION      CHOLECYSTECTOMY      COLONOSCOPY  06/23/2022    hx colon polyps    CORNEAL TRANSPLANT Bilateral     CORONARY ANGIOPLASTY WITH STENT PLACEMENT      HYSTERECTOMY      TONSILLECTOMY      TOTAL ABDOMINAL HYSTERECTOMY W/ BILATERAL SALPINGOOPHORECTOMY           Social History:    Social History     Tobacco Use   Smoking Status Former    Current packs/day: 0.00    Average packs/day: 0.5 packs/day for 51.4 years (25.7 ttl pk-yrs)    Types: Cigarettes    Start date: 1973    Quit date: 5/15/2024    Years  "since quittin.5    Passive exposure: Current   Smokeless Tobacco Never   Tobacco Comments    Off and on smoker approximately 50 years      Social History     Substance and Sexual Activity   Alcohol Use Never     Social History     Substance and Sexual Activity   Drug Use Never         Family History:    Family History   Problem Relation Name Age of Onset    Osteoarthritis Mother      Diabetes Mother      Heart failure Mother      Hyperlipidemia Mother      Hypertension Mother      Cancer Father      Diabetes Father      Hypertension Father      Heart attack Father      Lung cancer Father      Cancer Brother      Multiple myeloma Brother      Autoimmune disease Daughter      Thyroid disease Daughter      Hypertension Daughter         Review of Systems:    Review of Systems   Constitutional:  Negative for appetite change, chills, fatigue, fever and unexpected weight change.   Eyes:  Negative for visual disturbance.   Respiratory:  Negative for cough and shortness of breath.    Cardiovascular:  Negative for chest pain and leg swelling.   Gastrointestinal:  Positive for nausea. Negative for abdominal pain, change in bowel habit, constipation and vomiting.        Reports chronic diarrhea but this is much improved; she is scheduled for EGD for nausea with Dr. Steel. She has recently had a colonoscopy   Genitourinary:  Positive for frequency.   Musculoskeletal:  Negative for arthralgias.   Integumentary:  Negative for rash.   Neurological:  Negative for dizziness and headaches.   Psychiatric/Behavioral:  The patient is not nervous/anxious.         Vitals:    Vitals:    24 1409   BP: 130/80   BP Location: Left arm   Patient Position: Sitting   Pulse: 92   Resp: 20   Temp: 98.3 °F (36.8 °C)   TempSrc: Oral   SpO2: 98%   Weight: 91.4 kg (201 lb 9.6 oz)   Height: 5' 6" (1.676 m)       Body mass index is 32.54 kg/m².    Wt Readings from Last 3 Encounters:   24 1409 91.4 kg (201 lb 9.6 oz)   10/14/24 0744 82.6 " kg (182 lb)   09/20/24 1520 83.9 kg (185 lb)        Physical Exam:    Physical Exam  Constitutional:       General: She is not in acute distress.     Appearance: Normal appearance. She is obese.   HENT:      Nose: Nose normal.      Mouth/Throat:      Mouth: Mucous membranes are moist.      Pharynx: Oropharynx is clear.   Eyes:      Conjunctiva/sclera: Conjunctivae normal.   Cardiovascular:      Rate and Rhythm: Normal rate and regular rhythm.      Heart sounds: Normal heart sounds. No murmur heard.  Pulmonary:      Effort: Pulmonary effort is normal. No respiratory distress.      Breath sounds: Normal breath sounds. No wheezing, rhonchi or rales.   Abdominal:      General: Bowel sounds are normal.      Palpations: Abdomen is soft.      Tenderness: There is no abdominal tenderness. There is no right CVA tenderness, left CVA tenderness, guarding or rebound.   Musculoskeletal:      Cervical back: Neck supple.      Right lower leg: No edema.      Left lower leg: No edema.   Skin:     Findings: No rash.   Neurological:      General: No focal deficit present.      Mental Status: She is alert. Mental status is at baseline.   Psychiatric:         Mood and Affect: Mood normal.         Assessment/Plan:   1. Type 2 diabetes mellitus without complication, without long-term current use of insulin  -     CBC Auto Differential; Future; Expected date: 12/05/2024  -     Comprehensive Metabolic Panel; Future; Expected date: 12/05/2024  -     Lipid Panel; Future; Expected date: 12/05/2024  -     Microalbumin/Creatinine Ratio, Urine  -     Hemoglobin A1C; Future; Expected date: 12/05/2024    2. Essential hypertension  -     CBC Auto Differential; Future; Expected date: 12/05/2024  -     Comprehensive Metabolic Panel; Future; Expected date: 12/05/2024  -     Lipid Panel; Future; Expected date: 12/05/2024  -     Microalbumin/Creatinine Ratio, Urine    3. Mixed hyperlipidemia  -     CBC Auto Differential; Future; Expected date:  12/05/2024  -     Comprehensive Metabolic Panel; Future; Expected date: 12/05/2024  -     Lipid Panel; Future; Expected date: 12/05/2024    4. Hypokalemia  -     Comprehensive Metabolic Panel; Future; Expected date: 12/05/2024    5. Iron deficiency anemia due to chronic blood loss  -     CBC Auto Differential; Future; Expected date: 12/05/2024  -     Iron and TIBC; Future; Expected date: 12/05/2024  -     Ferritin; Future; Expected date: 12/05/2024    6. Nausea  -     promethazine (PHENERGAN) 25 MG tablet; Take 1 tablet (25 mg total) by mouth every 6 (six) hours as needed for Nausea (may cause drowsiness).  Dispense: 30 tablet; Refill: 2    7. Lumbar disc disease  -     cyclobenzaprine (FLEXERIL) 10 MG tablet; Take 1 tablet (10 mg total) by mouth 3 (three) times daily as needed for Muscle spasms.  Dispense: 45 tablet; Refill: 2    8. Muscle spasm  -     cyclobenzaprine (FLEXERIL) 10 MG tablet; Take 1 tablet (10 mg total) by mouth 3 (three) times daily as needed for Muscle spasms.  Dispense: 45 tablet; Refill: 2    9. Acute non-recurrent maxillary sinusitis  -     chlorpheniramine-phenyleph-DM (ED A-HIST DM) 4-10-10 mg Tab; Take 1 tablet by mouth every 8 (eight) hours as needed (congestion or cough).  Dispense: 30 tablet; Refill: 0  -     dexAMETHasone injection 4 mg  -     methylPREDNISolone acetate injection 40 mg  -     amoxicillin (AMOXIL) 875 MG tablet; Take 1 tablet (875 mg total) by mouth 2 (two) times daily. for 10 days  Dispense: 20 tablet; Refill: 0  -     fluconazole (DIFLUCAN) 150 MG Tab; Take one tablet by mouth every 72 hours X 3 doses if needed for yeast infection after completion of antibiotics.  Dispense: 3 tablet; Refill: 0    10. Seasonal allergies  -     montelukast (SINGULAIR) 10 mg tablet; Take 1 tablet (10 mg total) by mouth every evening.  Dispense: 90 tablet; Refill: 1    11. Urinary frequency  -     Urinalysis, Reflex to Urine Culture; Future; Expected date: 12/05/2024  -     Urinalysis,  Microscopic         Active Problem List with Overview Notes    Diagnosis Date Noted    Mild chronic obstructive pulmonary disease 05/16/2022    Atherosclerotic heart disease of native coronary artery with other forms of angina pectoris     CHF (congestive heart failure)     History of CVA (cerebrovascular accident)     Ischemic cardiomyopathy     Essential hypertension 08/03/2021    Type 2 diabetes mellitus without complication, without long-term current use of insulin 08/03/2021    Depression     Bilateral carotid bruits     Mixed hyperlipidemia 08/03/2021    Hypokalemia 04/12/2023    Muscle spasm 04/12/2023    BMI 28.0-28.9,adult 01/31/2023    Iron deficiency anemia due to chronic blood loss 06/21/2022    Peripheral vascular disease 05/16/2022    OA (osteoarthritis)     Lumbar disc disease     Gastroesophageal reflux disease without esophagitis 08/03/2021    B12 deficiency     History of colon polyps     Actinic keratoses 03/07/2019    History of degenerative disc disease 03/07/2019    Diverticula, colon 06/21/2022    Chronic diarrhea 01/26/2024        Health Maintenance:  Health Maintenance   Topic Date Due    TETANUS VACCINE  Never done    Shingles Vaccine (1 of 2) Never done    RSV Vaccine (Age 60+ and Pregnant patients) (1 - Risk 60-74 years 1-dose series) Never done    Mammogram  07/28/2024    Eye Exam  08/29/2024    Influenza Vaccine (1) 09/01/2024    COVID-19 Vaccine (4 - 2024-25 season) 09/01/2024    Foot Exam  01/30/2025    Hemoglobin A1c  02/06/2025    DEXA Scan  03/09/2025    High Dose Statin  08/06/2025    Aspirin/Antiplatelet Therapy  08/06/2025    Diabetes Urine Screening  08/06/2025    Lipid Panel  08/06/2025    LDCT Lung Screen  09/20/2025    Colorectal Cancer Screening  10/14/2029    Hepatitis C Screening  Completed    Pneumococcal Vaccines (Age 65+)  Completed   Diabetic eye exam - Dr. Dusty Buenrostro in [place]     RTC in 4 months for chronic follow up.  RTC sooner if needed.   Patient voiced  understanding and is agreeable to plan.      Dinora Montero MD    [facility] Medicine

## 2024-12-10 ENCOUNTER — PATIENT OUTREACH (OUTPATIENT)
Facility: HOSPITAL | Age: 74
End: 2024-12-10
Payer: MEDICARE

## 2024-12-10 NOTE — LETTER
AUTHORIZATION FOR RELEASE OF   CONFIDENTIAL INFORMATION    Dear Dr. Buenrostro,    We are seeing Maddie Ya, date of birth 1950, in the clinic at [facility]. Adriana Montero MD is the patient's PCP. Maddie Ya has an outstanding lab/procedure at the time we reviewed her chart. In order to help keep her health information updated, she has authorized us to request the following medical record(s):        (  )  MAMMOGRAM                                      (  )  COLONOSCOPY      (  )  PAP SMEAR                                          (  )  OUTSIDE LAB RESULTS     (  )  DEXA SCAN                                          ( x )  EYE EXAM            (  )  FOOT EXAM                                          (  )  ENTIRE RECORD     (  )  OUTSIDE IMMUNIZATIONS                 (  )  _______________         Please fax records to [facility], Mallary Harvey, LPN Care Coordinator, [phone].      If you have any questions, please contact Lester at [phone]. .           Patient Name: Maddie Ya  : 1950  Patient Phone #: [phone]                Maddie Ya  MRN: [id number]  : 1950  Age: 73 y.o.  Sex: female         Patient/Legal Guardian Signature  This signature was collected at 2024    Maddie Ya     Self  _______________________________   Printed Name/Relationship to Patient      Consent for Examination and Treatment: I hereby authorize the providers and employees of [facility] ([facility]) to provide medical treatment/services which includes, but is not limited to, performing and administering tests and diagnostic procedures that are deemed necessary, including, but not limited to, imaging examinations, blood tests and other laboratory procedures as may be required by the hospital, clinic, or may be ordered by my physician(s) or persons working under the general and/or special instructions of my physician(s).      I understand and agree that  this consent covers all authorized persons, including but not limited to physicians, residents, nurse practitioners, physicians' assistants, specialists, consultants, student nurses, and independently contracted physicians, who are called upon by the physician in charge, to carry out the diagnostic procedures and medical or surgical treatment.     I hereby authorize [facility] to retain or dispose of any specimens or tissue, should there be such remaining from any test or procedure.     I hereby authorize and give consent for [facility] providers and employees to take photographs, images or videotapes of such diagnostic, surgical or treatment procedures of Patient as may be required by [facility] or as may be ordered by a physician. I further acknowledge and agree that [facility] may use cameras or other devices for patient monitoring.     I am aware that the practice of medicine is not an exact science, and I acknowledge that no guarantees have been made to me as to the outcome of any tests, procedures or treatment.     Authorization for Release of Information: I understand that my insurance company and/or their agents may need information necessary to make determinations about payment/reimbursement. I hereby provide authorization to release to all insurance companies, their successors, assignees, other parties with whom they may have contracted, or others acting on their behalf, that are involved with payment for any hospital and/or clinic charges incurred by the patient, any information that they request and deem necessary for payment/reimbursement, and/or quality review.  I further authorize the release of my health information to physicians or other health care practitioners on staff who are involved in my health care now and in the future, and to other health care providers, entities, or institutions for the purpose of my continued care and treatment, including referrals.     REGISTRATION AUTHORIZATION  Form No. 07941  (Rev. 3/25/2024)    Page 1 of 3                       Medicare Patient's Certification and Authorization to Release Information and Payment Request:  I certify that the information given by me in applying for payment under Title XVIII of the Social Security Act is correct. I authorize any bernardo of medical or other information about me to release to the Social SecurityAdministration, or its intermediaries or carriers, any information needed for this or a related Medicare claim. I request that payment of authorized benefits be made on my behalf.     Assignment of Insurance Benefits:   I hereby authorize any and all insurance companies, health plans, defined   benefit plans, health insurers or any entity that is or may be responsible for payment of my medical expenses to pay all hospital and medical benefits now due, and to become due and payable to me under any hospital benefits, sick benefits, injury benefits or any other benefit for services rendered to me, including Major Medical Benefits, direct to [facility] and all independently contracted physicians. I assign any and all rights that I may have against any and all insurance companies, health plans, defined benefit plans, health insurers or any entity that is or may be responsible for payment of my medical expenses, including, but not limited to any right to appeal a denial of a claim, any right to bring any action, lawsuit, administrative proceeding, or other cause of action on my behalf. I specifically assign my right to pursue litigation against any and all insurance companies, health plans, defined benefit plans, health insurers or any entity that is or may be responsible for payment of my medical expenses based upon a refusal to pay charges.            E. Valuables: It is understood and agreed that [facility] is not liable for the damage to or loss of any money, jewelry,   documents, dentures, eye glasses, hearing aids, prosthetics, or other property of value.      F. Computer Equipment: I understand and agree that should I choose to use computer equipment owned by [facility] or if I choose to access the Internet via [facility]s network, I do so at my own risk. [facility] is not responsible for any damage to my computer equipment or to any damages of any type that might arise from my loss of equipment or data.     G. Acceptance of Financial Responsibility:  I agree that in consideration of the services and   supplies that have been   or will be furnished to the patient, I am hereby obligated to pay all charges made for or on the account of the patient according to the standard rates (in effect at the time the services and supplies are delivered) established by [facility], including its Patient Financial Assistance Policy to the extent it is applicable. I understand that I am responsible for all charges, or portions thereof, not covered by insurance or other sources. Patient refunds will be distributed only after balances at all [facility] facilities are paid.     H. Communication Authorization:  I hereby authorize [facility] and its representatives, along with any billing service   or  who may work on their behalf, to contact me on   my cell phone and/or home phone using pre- recorded messages, artificial voice messages, automatic telephone dialing devices or other computer assisted technology, or by electronic      mail, text messaging, or by any other form of electronic communication. This includes, but is not limited to, appointment reminders, yearly physical exam reminders, preventive care reminders, patient campaigns, welcome calls, and calls about account balances on my account or any account on which I am listed as a guarantor. I understand I have the right to opt out of these communications at any time.      Relationship  Between  Facility and  Provider:      I understand that some, but not all, providers furnishing services to the patient are not employees or agents  of [facility]. The patient is under the care and supervision of his/her attending physician, and it is the responsibility of the facility and its nursing staff to carry out the instructions of such physicians. It is the responsibility of the patient's physician/designee to obtain the patient's informed consent, when required, for medical or surgical treatment, special diagnostic or therapeutic procedures, or hospital services rendered for the patient under the special instructions of the physician/designee.           REGISTRATION AUTHORIZATION  Form No. 03741 (Rev. 3/25/2024)    Page 2 of 3                       Immunizations: [facility] shares immunization information with state sponsored health departments to help you and your doctor keep track of your immunization records. By signing, you consent to have this information shared with the health department in your state:                                [place] - LINKS ([place] Immunization Network for Kids Statewide)                                [place] - MIIX ([place] Immunization Information eXchange)                                [place] - ImmPRINT (Immunization Patient Registry with Integrated Technology)     TERM: This authorization is valid for this and subsequent care/treatment I receive at [facility] and will remain valid unless/until revoked in writing by me.     [facility]: As used in this document, [facility] means all [facility] owned and managed facilities, including, but not limited to, all health centers, surgery centers, clinics, urgent care centers, and hospitals.         [facility] complies with applicable Federal civil rights laws and does not discriminate on the basis of race, color, national origin, age, disability, or sex.  ATENCIÓN: si habla kandy, tiene a chavis disposición servicios gratuitos de asistencia lingüística. Miky dillon [phone].  [facility] Ý: N?u b?n nói Ti?ng Vi?t, có các d?ch v? h? tr? ngôn ng? mi?n phí  dành cho b?n. G?i s? [phone].        REGISTRATION AUTHORIZATION  Form No. 78915 (Rev. 3/25/2024)   Page 3 of 3

## 2024-12-10 NOTE — PROGRESS NOTES
Patient position sitting with back exposed for thoracentesis. Patient prepped and draped per unit standard.    Safety straps applied:N/A   Population Health Chart Review & Patient Outreach Details    Health Maintenance Topics Addressed and Outreach Outcomes / Actions Taken:  Diabetic Eye Exam [x] RINA faxed to Dr. Dusty Buenrostro for eye exam.

## 2024-12-16 ENCOUNTER — PATIENT OUTREACH (OUTPATIENT)
Facility: HOSPITAL | Age: 74
End: 2024-12-16
Payer: MEDICARE

## 2025-01-14 ENCOUNTER — ANESTHESIA EVENT (OUTPATIENT)
Dept: GASTROENTEROLOGY | Facility: HOSPITAL | Age: 75
End: 2025-01-14
Payer: MEDICARE

## 2025-01-14 ENCOUNTER — ANESTHESIA (OUTPATIENT)
Dept: GASTROENTEROLOGY | Facility: HOSPITAL | Age: 75
End: 2025-01-14
Payer: MEDICARE

## 2025-01-14 ENCOUNTER — HOSPITAL ENCOUNTER (OUTPATIENT)
Dept: GASTROENTEROLOGY | Facility: HOSPITAL | Age: 75
Discharge: HOME | End: 2025-01-14
Attending: INTERNAL MEDICINE | Admitting: INTERNAL MEDICINE
Payer: MEDICARE

## 2025-01-14 VITALS
WEIGHT: 199 LBS | TEMPERATURE: 98 F | BODY MASS INDEX: 31.98 KG/M2 | DIASTOLIC BLOOD PRESSURE: 85 MMHG | HEIGHT: 66 IN | SYSTOLIC BLOOD PRESSURE: 166 MMHG | OXYGEN SATURATION: 99 % | RESPIRATION RATE: 13 BRPM | HEART RATE: 75 BPM

## 2025-01-14 DIAGNOSIS — K21.9 GASTROESOPHAGEAL REFLUX DISEASE WITHOUT ESOPHAGITIS: ICD-10-CM

## 2025-01-14 LAB — GLUCOSE SERPL-MCNC: 166 MG/DL (ref 70–105)

## 2025-01-14 PROCEDURE — 88342 IMHCHEM/IMCYTCHM 1ST ANTB: CPT | Mod: 26,,, | Performed by: PATHOLOGY

## 2025-01-14 PROCEDURE — 82962 GLUCOSE BLOOD TEST: CPT

## 2025-01-14 PROCEDURE — 43239 EGD BIOPSY SINGLE/MULTIPLE: CPT | Performed by: INTERNAL MEDICINE

## 2025-01-14 PROCEDURE — 27201423 OPTIME MED/SURG SUP & DEVICES STERILE SUPPLY

## 2025-01-14 PROCEDURE — 43239 EGD BIOPSY SINGLE/MULTIPLE: CPT | Mod: ,,, | Performed by: INTERNAL MEDICINE

## 2025-01-14 PROCEDURE — D9220A PRA ANESTHESIA: Mod: ,,, | Performed by: NURSE ANESTHETIST, CERTIFIED REGISTERED

## 2025-01-14 PROCEDURE — 63600175 PHARM REV CODE 636 W HCPCS: Performed by: NURSE ANESTHETIST, CERTIFIED REGISTERED

## 2025-01-14 PROCEDURE — 37000009 HC ANESTHESIA EA ADD 15 MINS

## 2025-01-14 PROCEDURE — 88342 IMHCHEM/IMCYTCHM 1ST ANTB: CPT | Mod: TC,SUR | Performed by: INTERNAL MEDICINE

## 2025-01-14 PROCEDURE — 37000008 HC ANESTHESIA 1ST 15 MINUTES

## 2025-01-14 PROCEDURE — 88305 TISSUE EXAM BY PATHOLOGIST: CPT | Mod: 26,,, | Performed by: PATHOLOGY

## 2025-01-14 RX ORDER — LIDOCAINE HYDROCHLORIDE 20 MG/ML
INJECTION, SOLUTION EPIDURAL; INFILTRATION; INTRACAUDAL; PERINEURAL
Status: DISCONTINUED | OUTPATIENT
Start: 2025-01-14 | End: 2025-01-14

## 2025-01-14 RX ORDER — OMEPRAZOLE 40 MG/1
40 CAPSULE, DELAYED RELEASE ORAL DAILY
Qty: 90 CAPSULE | Refills: 3 | Status: SHIPPED | OUTPATIENT
Start: 2025-01-14 | End: 2026-01-14

## 2025-01-14 RX ORDER — SODIUM CHLORIDE 0.9 % (FLUSH) 0.9 %
10 SYRINGE (ML) INJECTION
Status: DISCONTINUED | OUTPATIENT
Start: 2025-01-14 | End: 2025-01-15 | Stop reason: HOSPADM

## 2025-01-14 RX ORDER — PROPOFOL 10 MG/ML
VIAL (ML) INTRAVENOUS
Status: DISCONTINUED | OUTPATIENT
Start: 2025-01-14 | End: 2025-01-14

## 2025-01-14 RX ADMIN — LIDOCAINE HYDROCHLORIDE 50 MG: 20 INJECTION, SOLUTION INTRAVENOUS at 10:01

## 2025-01-14 RX ADMIN — PROPOFOL 50 MG: 10 INJECTION, EMULSION INTRAVENOUS at 10:01

## 2025-01-14 NOTE — DISCHARGE INSTRUCTIONS
Procedure Date  1/14/25     Impression  Overall Impression:   Grade B esophagitis in the lower third of the esophagus and GE junction; performed cold forceps biopsy  4 cm type I hiatal hernia  Mild erythematous mucosa in the antrum, consistent with gastritis; performed cold forceps biopsies to rule out H. pylori  The upper third of the esophagus, middle third of the esophagus, cardia, body of the stomach, incisura, duodenal bulb, 1st part of the duodenum and 2nd part of the duodenum appeared normal.        Recommendation  Await pathology results  Increase Prilosec to 40 mg daily; use Pepcid 20 mg up to 3x/day for breakthrough reflux   Follow up in clinic as scheduled or as needed       Outcome of procedure: successful EGD  Disposition: patient to recovery following procedure; discharge to home when appropriate parameters met  Provisions for follow up: please call my office for any unexpected symptoms like chest or abdominal pain or bleeding following your procedure.  Final Diagnosis: GERD     Continue home medications as instructed. No driving for 24 hrs. No signing legal documents for 24 hrs. Drink plenty of fluids

## 2025-01-14 NOTE — ANESTHESIA PREPROCEDURE EVALUATION
01/14/2025  Maddie Ya is a 74 y.o., female.      Pre-op Assessment    I have reviewed the Patient Summary Reports.     I have reviewed the Nursing Notes. I have reviewed the NPO Status.   I have reviewed the Medications.     Review of Systems  Anesthesia Hx:  No problems with previous Anesthesia             Denies Family Hx of Anesthesia complications.    Denies Personal Hx of Anesthesia complications.                    Social:  Non-Smoker       Hematology/Oncology:    Oncology Normal    -- Anemia:                                  EENT/Dental:  EENT/Dental Normal           Cardiovascular:     Hypertension  Past MI CAD      Angina CHF       ECG has been reviewed.     Cardiovascular Symptoms: Angina       Coronary Artery Disease:          Hx of Myocardial Infarction     Congestive Heart Failure (CHF)                Hypertension         Pulmonary:   COPD         Chronic Obstructive Pulmonary Disease (COPD):                      Renal/:  Renal/ Normal                 Hepatic/GI:     GERD         Gerd          Musculoskeletal:  Arthritis        Arthritis          Neurological:   CVA            Arthritis              CVA - Cerebrovasular Accident                 Endocrine:  Diabetes, type 2    Diabetes                      Dermatological:  Skin Normal    Psych:  Psychiatric History  depression            Physical Exam  General: Well nourished    Airway:  Mallampati: II   Mouth Opening: Normal  TM Distance: Normal  Tongue: Normal  Neck ROM: Normal ROM    Dental:  Intact    Anesthesia Plan  Type of Anesthesia, risks & benefits discussed:    Anesthesia Type: MAC  Intra-op Monitoring Plan: Standard ASA Monitors  Post Op Pain Control Plan: multimodal analgesia  Induction:  IV  Informed Consent: Informed consent signed with the Patient and all parties understand the risks and agree with anesthesia plan.  All  questions answered. Patient consented to blood products? Yes  ASA Score: 3  Day of Surgery Review of History & Physical: H&P Update referred to the surgeon/provider.I have interviewed and examined the patient. I have reviewed the patient's H&P dated: There are no significant changes.     Ready For Surgery From Anesthesia Perspective.   .

## 2025-01-14 NOTE — H&P
Gastroenterology Pre-procedure H&P    History of Present Illness    Maddie Ya is a 74 y.o. female that  has a past medical history of Adenomatous polyp of ascending colon (06/21/2022), Adenomatous polyp of transverse colon (06/21/2022), B12 deficiency, Bilateral carotid bruits, CAD (coronary artery disease), CHF (congestive heart failure), CVA (cerebral vascular accident), Depression, Diabetes mellitus, type 2, Diverticula, colon (06/21/2022), GERD (gastroesophageal reflux disease), Heart attack, History of colon polyps, Hyperlipidemia, Hypertension, Iron deficiency anemia due to chronic blood loss (06/21/2022), Ischemic cardiomyopathy, Lumbar disc disease, OA (osteoarthritis), and PAD (peripheral artery disease).     Patient with GERD refractory to PPI and chronic diarrhea here for EGD. She does report near resolution of diarrhea since starting Colestid - started after her colonoscopy.       Past Medical History:   Diagnosis Date    Adenomatous polyp of ascending colon 06/21/2022    Adenomatous polyp of transverse colon 06/21/2022    B12 deficiency     Bilateral carotid bruits     CAD (coronary artery disease)     sees aHlina    CHF (congestive heart failure)     CVA (cerebral vascular accident)     Depression     Diabetes mellitus, type 2     Diverticula, colon 06/21/2022    GERD (gastroesophageal reflux disease)     Heart attack     History of colon polyps     Hyperlipidemia     Hypertension     Iron deficiency anemia due to chronic blood loss 06/21/2022    Ischemic cardiomyopathy     Lumbar disc disease     OA (osteoarthritis)     PAD (peripheral artery disease)        Past Surgical History:   Procedure Laterality Date    ANKLE SURGERY      APPENDECTOMY      BACK SURGERY      CARDIAC CATHETERIZATION      CHOLECYSTECTOMY      COLONOSCOPY  06/23/2022    hx colon polyps    CORNEAL TRANSPLANT Bilateral     CORONARY ANGIOPLASTY WITH STENT PLACEMENT      HYSTERECTOMY      TONSILLECTOMY      TOTAL ABDOMINAL  HYSTERECTOMY W/ BILATERAL SALPINGOOPHORECTOMY         Family History   Problem Relation Name Age of Onset    Osteoarthritis Mother      Diabetes Mother      Heart failure Mother      Hyperlipidemia Mother      Hypertension Mother      Cancer Father      Diabetes Father      Hypertension Father      Heart attack Father      Lung cancer Father      Cancer Brother      Multiple myeloma Brother      Autoimmune disease Daughter      Thyroid disease Daughter      Hypertension Daughter         Social History     Socioeconomic History    Marital status:    Tobacco Use    Smoking status: Former     Current packs/day: 0.00     Average packs/day: 0.5 packs/day for 51.4 years (25.7 ttl pk-yrs)     Types: Cigarettes     Start date:      Quit date: 5/15/2024     Years since quittin.6     Passive exposure: Current    Smokeless tobacco: Never    Tobacco comments:     Off and on smoker approximately 50 years    Substance and Sexual Activity    Alcohol use: Never    Drug use: Never    Sexual activity: Not Currently     Social Drivers of Health     Financial Resource Strain: Low Risk  (2024)    Overall Financial Resource Strain (CARDIA)     Difficulty of Paying Living Expenses: Not hard at all   Food Insecurity: No Food Insecurity (2024)    Hunger Vital Sign     Worried About Running Out of Food in the Last Year: Never true     Ran Out of Food in the Last Year: Never true   Transportation Needs: No Transportation Needs (2024)    PRAPARE - Transportation     Lack of Transportation (Medical): No     Lack of Transportation (Non-Medical): No   Physical Activity: Inactive (2024)    Exercise Vital Sign     Days of Exercise per Week: 0 days     Minutes of Exercise per Session: 0 min   Stress: Stress Concern Present (2024)    [employer] [place] [employer] - Occupational Stress Questionnaire     Feeling of Stress : To some extent   Housing Stability: Low Risk  (2024)    Housing Stability  Vital Sign     Unable to Pay for Housing in the Last Year: No     Number of Places Lived in the Last Year: 1     Unstable Housing in the Last Year: No       Current Outpatient Medications   Medication Sig Dispense Refill    aspirin (ECOTRIN) 81 MG EC tablet Take 1 tablet by mouth Daily.      atorvastatin (LIPITOR) 80 MG tablet Take 1 tablet (80 mg total) by mouth every evening. 90 tablet 1    carvediloL (COREG) 6.25 MG tablet Take 1 tablet (6.25 mg total) by mouth 2 (two) times daily. 180 tablet 1    cetirizine (ZYRTEC) 10 MG tablet Take 1 tablet (10 mg total) by mouth once daily. 90 tablet 1    chlorpheniramine-phenyleph-DM (ED A-HIST DM) 4-10-10 mg Tab Take 1 tablet by mouth every 8 (eight) hours as needed (congestion or cough). 30 tablet 0    cilostazoL (PLETAL) 100 MG Tab Take 1 tablet (100 mg total) by mouth 2 (two) times daily. 180 tablet 1    colestipoL (COLESTID) 1 gram Tab Take 1 tablet (1 g total) by mouth 2 (two) times daily. 180 tablet 3    cyanocobalamin 1,000 mcg/mL injection Once a month for 6 months. 1 mL 6    cyclobenzaprine (FLEXERIL) 10 MG tablet Take 1 tablet (10 mg total) by mouth 3 (three) times daily as needed for Muscle spasms. 45 tablet 2    dicyclomine (BENTYL) 10 MG capsule Take 1 capsule (10 mg total) by mouth 4 (four) times daily before meals and nightly. As needed for diarrhea 120 capsule 1    EScitalopram oxalate (LEXAPRO) 20 MG tablet Take 1 tablet (20 mg total) by mouth once daily. 90 tablet 1    FLAREX 0.1 % DrpS Place 1 drop into both eyes Daily.      fluconazole (DIFLUCAN) 150 MG Tab Take one tablet by mouth every 72 hours X 3 doses if needed for yeast infection after completion of antibiotics. 3 tablet 0    fluticasone propionate (FLONASE) 50 mcg/actuation nasal spray 1 spray (50 mcg total) by Each Nostril route once daily. 15.8 mL 3    furosemide (LASIX) 40 MG tablet Take 1 tablet (40 mg total) by mouth daily as needed (swelling). 90 tablet 1    glipiZIDE (GLUCOTROL) 2.5 MG TR24  Take 2 tablets (5 mg total) by mouth daily with breakfast. 90 tablet 1    montelukast (SINGULAIR) 10 mg tablet Take 1 tablet (10 mg total) by mouth every evening. 90 tablet 1    omeprazole (PRILOSEC) 20 MG capsule Take 1 capsule (20 mg total) by mouth once daily. 90 capsule 1    pantoprazole (PROTONIX) 40 MG tablet Take 1 tablet (40 mg total) by mouth 2 (two) times daily. Hold omeprazole until finished with this med THEN resume omeprazole. 28 tablet 0    potassium chloride (K-TAB) 20 mEq Take 1 tablet (20 mEq total) by mouth once daily. 90 tablet 1    potassium chloride SA (K-DUR,KLOR-CON) 20 MEQ tablet Take 1 tablet (20 mEq total) by mouth 2 (two) times daily. 30 tablet 0    promethazine (PHENERGAN) 25 MG tablet Take 1 tablet (25 mg total) by mouth every 6 (six) hours as needed for Nausea (may cause drowsiness). 30 tablet 2    spironolactone (ALDACTONE) 25 MG tablet Take 1 tablet (25 mg total) by mouth daily as needed (swelling). 90 tablet 1    spironolactone (ALDACTONE) 25 MG tablet 1 tablet.      TRUE METRIX GLUCOSE TEST STRIP Strp Inject 1-2 Sticks into the skin once daily at 6am.      valsartan (DIOVAN) 80 MG tablet Take 1 tablet (80 mg total) by mouth once daily. 90 tablet 1    vit C/E/Zn/coppr/lutein/zeaxan (PRESERVISION AREDS-2 ORAL) Take by mouth.       No current facility-administered medications for this encounter.       Review of patient's allergies indicates:   Allergen Reactions    Hydromorphone Itching    Tuberculin, purified protein derivative        Objective:  There were no vitals filed for this visit.     GEN: normal appearing, NAD, AAO x3  HENT: NCAT, anicteric, OP benign  CV: normal rate, regular rhythm  RESP: CTA, symmetric rise, unlabored  ABD: soft, ND, no guarding or TTP  SKIN: warm and dry  NEURO: grossly afocal    Assessment and Plan:    Proceed with:    EGD for GERD, diarrhea       Nick Steel MD  Gastroenterology

## 2025-01-14 NOTE — TRANSFER OF CARE
"Anesthesia Transfer of Care Note    Patient: Maddie Ya    Procedure(s) Performed: EGD    Patient location: GI    Anesthesia Type: MAC    Transport from OR: Transported from OR on room air with adequate spontaneous ventilation. Continuous ECG monitoring in transport. Continuous SpO2 monitoring in transport    Post pain: adequate analgesia    Post assessment: no apparent anesthetic complications    Post vital signs: stable    Level of consciousness: responds to stimulation, awake and sedated    Nausea/Vomiting: no nausea/vomiting    Complications: none    Transfer of care protocol was followed      Last vitals: Visit Vitals  BP (!) 144/80 (BP Location: Right arm, Patient Position: Lying)   Pulse 86   Temp 36.8 °C (98.2 °F) (Oral)   Resp 20   Ht 5' 6" (1.676 m)   Wt 90.3 kg (199 lb)   SpO2 95%   Breastfeeding No   BMI 32.12 kg/m²     "

## 2025-02-04 ENCOUNTER — HOSPITAL ENCOUNTER (OUTPATIENT)
Dept: RADIOLOGY | Facility: HOSPITAL | Age: 75
Discharge: HOME | End: 2025-02-04
Attending: FAMILY MEDICINE
Payer: MEDICARE

## 2025-02-04 ENCOUNTER — OFFICE VISIT (OUTPATIENT)
Dept: FAMILY MEDICINE | Facility: CLINIC | Age: 75
End: 2025-02-04
Payer: MEDICARE

## 2025-02-04 VITALS
SYSTOLIC BLOOD PRESSURE: 138 MMHG | WEIGHT: 202 LBS | HEART RATE: 84 BPM | BODY MASS INDEX: 32.47 KG/M2 | OXYGEN SATURATION: 98 % | HEIGHT: 66 IN | TEMPERATURE: 99 F | DIASTOLIC BLOOD PRESSURE: 76 MMHG | RESPIRATION RATE: 14 BRPM

## 2025-02-04 DIAGNOSIS — E11.9 TYPE 2 DIABETES MELLITUS WITHOUT COMPLICATION, WITHOUT LONG-TERM CURRENT USE OF INSULIN: ICD-10-CM

## 2025-02-04 DIAGNOSIS — E66.811 CLASS 1 OBESITY WITH BODY MASS INDEX (BMI) OF 30.0 TO 30.9 IN ADULT, UNSPECIFIED OBESITY TYPE, UNSPECIFIED WHETHER SERIOUS COMORBIDITY PRESENT: ICD-10-CM

## 2025-02-04 DIAGNOSIS — E78.2 MIXED HYPERLIPIDEMIA: ICD-10-CM

## 2025-02-04 DIAGNOSIS — R05.2 SUBACUTE COUGH: ICD-10-CM

## 2025-02-04 DIAGNOSIS — I25.118 ATHEROSCLEROTIC HEART DISEASE OF NATIVE CORONARY ARTERY WITH OTHER FORMS OF ANGINA PECTORIS: ICD-10-CM

## 2025-02-04 DIAGNOSIS — Z00.00 ENCOUNTER FOR SUBSEQUENT ANNUAL WELLNESS VISIT (AWV) IN MEDICARE PATIENT: Primary | ICD-10-CM

## 2025-02-04 DIAGNOSIS — Z00.00 ENCOUNTER FOR PREVENTIVE HEALTH EXAMINATION: ICD-10-CM

## 2025-02-04 DIAGNOSIS — M15.0 PRIMARY OSTEOARTHRITIS INVOLVING MULTIPLE JOINTS: ICD-10-CM

## 2025-02-04 DIAGNOSIS — I10 ESSENTIAL HYPERTENSION: ICD-10-CM

## 2025-02-04 DIAGNOSIS — I73.9 PERIPHERAL VASCULAR DISEASE: ICD-10-CM

## 2025-02-04 PROCEDURE — 71046 X-RAY EXAM CHEST 2 VIEWS: CPT | Mod: 26,,, | Performed by: RADIOLOGY

## 2025-02-04 PROCEDURE — G0439 PPPS, SUBSEQ VISIT: HCPCS | Mod: ,,, | Performed by: FAMILY MEDICINE

## 2025-02-04 PROCEDURE — 71046 X-RAY EXAM CHEST 2 VIEWS: CPT | Mod: TC

## 2025-02-04 NOTE — PROGRESS NOTES
"  Maddie Ya presented for a follow-up Medicare AWV today. The following components were reviewed and updated:    Medical history  Family History  Social history  Allergies and Current Medications  Health Risk Assessment  Health Maintenance  Care Team    73 yo WF presents to clinic today with CC of Medicare AWV.   Patient has a PMH significant for Type II DM, HLD, HLD, GERD, vitamin B12 deficiency, CAD, CHF, OA, depression, h/o CVA, ischemic cardiomyopathy, COPD, anemia, hypokalemia, and chronic diarrhea.  Patient reports she as seen at Urgent Care previously and was diagnosed with pneumonia. Reports she is feel ing improved but still has a cough. She reports she has completed antibiotics but has not had a repeat CXR. Denies SOB.  Patient reports chronic issues are well controlled on current medication regimen.  Denies problems or side effects with medications.  Patient is, otherwise, without complaints.    **See Completed Assessments for Annual Wellness visit with in the encounter summary    The following assessments were completed:  Depression Screening  Cognitive function Screening  Timed Get Up Test  Whisper Test      Opioid documentation:      Patient does not have a current opioid prescription.          Vitals:    02/04/25 0836   BP: 138/76   BP Location: Left arm   Patient Position: Sitting   Pulse: 84   Resp: 14   Temp: 98.5 °F (36.9 °C)   TempSrc: Oral   SpO2: 98%   Weight: 91.6 kg (202 lb)   Height: 5' 6" (1.676 m)     Body mass index is 32.6 kg/m².       Physical Exam  Constitutional:       Appearance: Normal appearance. She is obese.   HENT:      Head: Normocephalic and atraumatic.      Nose: Nose normal.      Mouth/Throat:      Mouth: Mucous membranes are moist.      Pharynx: Oropharynx is clear.   Eyes:      Extraocular Movements: Extraocular movements intact.      Conjunctiva/sclera: Conjunctivae normal.   Cardiovascular:      Rate and Rhythm: Normal rate and regular rhythm.      Heart sounds: " Normal heart sounds. No murmur heard.     No friction rub. No gallop.   Pulmonary:      Effort: Pulmonary effort is normal. No respiratory distress.      Breath sounds: Normal breath sounds. No wheezing, rhonchi or rales.   Abdominal:      General: Abdomen is flat. Bowel sounds are normal. There is no distension.      Palpations: Abdomen is soft.   Musculoskeletal:      Cervical back: Neck supple.      Right lower leg: No edema.      Left lower leg: No edema.   Skin:     Findings: No rash.   Neurological:      General: No focal deficit present.      Mental Status: She is alert. Mental status is at baseline.       Protective Sensation (w/ 10 gram monofilament):  Right: Intact  Left: Decreased    Visual Inspection:  Normal -  Bilateral    Pedal Pulses:   Right: Present  Left: Present    Posterior Tibialis Pulses:   Right:Present  Left: Present      Diagnoses and health risks identified today and associated recommendations/orders:  1. Encounter for subsequent annual wellness visit (AWV) in Medicare patient      2. Essential hypertension  The current medical regimen is effective;  continue present plan and medications.  - DASH diet and exercise    3. Type 2 diabetes mellitus without complication, without long-term current use of insulin  The current medical regimen is effective;  continue present plan and medications.  - Low carb diet/exercise  Hemoglobin A1C   Date Value Ref Range Status   12/05/2024 6.8 <=7.0 % Final     Comment:       Normal:               <5.7%  Pre-Diabetic:       5.7% to 6.4%  Diabetic:             >6.4%  Diabetic Goal:     <7%   08/06/2024 6.4 4.5 - 6.6 % Final     Comment:       Normal:               <5.7%  Pre-Diabetic:       5.7% to 6.4%  Diabetic:             >6.4%  Diabetic Goal:     <7%   12/07/2023 6.0 4.5 - 6.6 % Final     Comment:       Normal:               <5.7%  Pre-Diabetic:       5.7% to 6.4%  Diabetic:             >6.4%  Diabetic Goal:     <7%       4. Mixed hyperlipidemia  The  current medical regimen is effective;  continue present plan and medications.  - Low cholesterol diet and exercise.    5. Atherosclerotic heart disease of native coronary artery with other forms of angina pectoris  - Stable  - Follow up with Cardiology as scheduled    6. Peripheral vascular disease  - stable    7. Primary osteoarthritis involving multiple joints  The current medical regimen is effective;  continue present plan and medications.    8. Encounter for preventive health examination    9. BMI 32.0-32.9,adult  - BMI discussed with patient.  - Diet and exercise  - Diet discussed and educational information provided  - Recommend 30 minutes of exercise at least 5 days per week.    10. Subacute cough  - X-Ray Chest PA And Lateral; Future    11. Class 1 obesity with body mass index (BMI) of 30.0 to 30.9 in adult, unspecified obesity type, unspecified whether serious comorbidity present  - BMI discussed with patient.  - Diet and exercise  - Diet discussed and educational information provided  - Recommend 30 minutes of exercise at least 5 days per week.      Provided Maddie with a 5-10 year written screening schedule and personal prevention plan. Recommendations were developed using the USPSTF age appropriate recommendations. Education, counseling, and referrals were provided as needed.  After Visit Summary printed and given to patient which includes a list of additional screenings\tests needed.    Declines flu, tetanus, shingles , covid booster and rsv vaccine  Greg bone density and mammogram report [facility]     Follow up in about 1 year (around 2/12/2026) for in 1 year for annual wellness visit at 8:00 a.m. .RTC as scheduled for chronic follow up. RTC sooner if needed.      Adriana Montero MD  I offered to discuss advanced care planning, including how to pick a person who would make decisions for you if you were unable to make them for yourself, called a health care power of , and what kind  of decisions you might make such as use of life sustaining treatments such as ventilators and tube feeding when faced with a life limiting illness recorded on a living will that they will need to know. (How you want to be cared for as you near the end of your natural life)     X Patient is interested in learning more about how to make advanced directives.  I provided them paperwork and offered to discuss this with them.

## 2025-02-04 NOTE — LETTER
AUTHORIZATION FOR RELEASE OF   CONFIDENTIAL INFORMATION    Dear [facility] Medical Records,    We are seeing Maddie Ya, date of birth 1950, in the clinic at [facility]. Adriana Monetro MD is the patient's PCP. Maddie Ya has an outstanding lab/procedure at the time we reviewed her chart. In order to help keep her health information updated, she has authorized us to request the following medical record(s):        (  x) Last MAMMOGRAM report                  (  )  COLONOSCOPY      (  )  PAP SMEAR                                          (  )  OUTSIDE LAB RESULTS     ( x ) Last DEXA SCAN report                       (  )  EYE EXAM            (  )  FOOT EXAM                                          (  )  ENTIRE RECORD     (  )  OUTSIDE IMMUNIZATIONS                 (  )  _______________         Please fax records to [facility], Hailey-Sharp, Anna-Marie, MD, [phone]     If you have any questions, please contact Lucille Ramirez rn awv nurse    at [phone].           Patient Name: Maddie Ya  : 1950  Patient Phone #: [phone]              Maddie Ya  MRN: [id number]  : 1950  Age: 73 y.o.  Sex: female         Patient/Legal Guardian Signature  This signature was collected at 2024    Maddie Ya     Self  _______________________________   Printed Name/Relationship to Patient      Consent for Examination and Treatment: I hereby authorize the providers and employees of [facility] ([employer]) to provide medical treatment/services which includes, but is not limited to, performing and administering tests and diagnostic procedures that are deemed necessary, including, but not limited to, imaging examinations, blood tests and other laboratory procedures as may be required by the hospital, clinic, or may be ordered by my physician(s) or persons working under the general and/or special instructions of my physician(s).      I understand  and agree that this consent covers all authorized persons, including but not limited to physicians, residents, nurse practitioners, physicians' assistants, specialists, consultants, student nurses, and independently contracted physicians, who are called upon by the physician in charge, to carry out the diagnostic procedures and medical or surgical treatment.     I hereby authorize [facility] to retain or dispose of any specimens or tissue, should there be such remaining from any test or procedure.     I hereby authorize and give consent for [facility] providers and employees to take photographs, images or videotapes of such diagnostic, surgical or treatment procedures of Patient as may be required by [facility] or as may be ordered by a physician. I further acknowledge and agree that [facility] may use cameras or other devices for patient monitoring.     I am aware that the practice of medicine is not an exact science, and I acknowledge that no guarantees have been made to me as to the outcome of any tests, procedures or treatment.     Authorization for Release of Information: I understand that my insurance company and/or their agents may need information necessary to make determinations about payment/reimbursement. I hereby provide authorization to release to all insurance companies, their successors, assignees, other parties with whom they may have contracted, or others acting on their behalf, that are involved with payment for any hospital and/or clinic charges incurred by the patient, any information that they request and deem necessary for payment/reimbursement, and/or quality review.  I further authorize the release of my health information to physicians or other health care practitioners on staff who are involved in my health care now and in the future, and to other health care providers, entities, or institutions for the purpose of my continued care and treatment, including referrals.     REGISTRATION  AUTHORIZATION  Form No. 57849 (Rev. 3/25/2024)    Page 1 of 3                       Medicare Patient's Certification and Authorization to Release Information and Payment Request:  I certify that the information given by me in applying for payment under Title XVIII of the Social Security Act is correct. I authorize any bernardo of medical or other information about me to release to the Social SecurityAdministration, or its intermediaries or carriers, any information needed for this or a related Medicare claim. I request that payment of authorized benefits be made on my behalf.     Assignment of Insurance Benefits:   I hereby authorize any and all insurance companies, health plans, defined   benefit plans, health insurers or any entity that is or may be responsible for payment of my medical expenses to pay all hospital and medical benefits now due, and to become due and payable to me under any hospital benefits, sick benefits, injury benefits or any other benefit for services rendered to me, including Major Medical Benefits, direct to [facility] and all independently contracted physicians. I assign any and all rights that I may have against any and all insurance companies, health plans, defined benefit plans, health insurers or any entity that is or may be responsible for payment of my medical expenses, including, but not limited to any right to appeal a denial of a claim, any right to bring any action, lawsuit, administrative proceeding, or other cause of action on my behalf. I specifically assign my right to pursue litigation against any and all insurance companies, health plans, defined benefit plans, health insurers or any entity that is or may be responsible for payment of my medical expenses based upon a refusal to pay charges.            E. Valuables: It is understood and agreed that [facility] is not liable for the damage to or loss of any money, jewelry,   documents, dentures, eye glasses, hearing aids, prosthetics,  or other property of value.     F. Computer Equipment: I understand and agree that should I choose to use computer equipment owned by [facility] or if I choose to access the Internet via [facility]s network, I do so at my own risk. [facility] is not responsible for any damage to my computer equipment or to any damages of any type that might arise from my loss of equipment or data.     G. Acceptance of Financial Responsibility:  I agree that in consideration of the services and   supplies that have been   or will be furnished to the patient, I am hereby obligated to pay all charges made for or on the account of the patient according to the standard rates (in effect at the time the services and supplies are delivered) established by [facility], including its Patient Financial Assistance Policy to the extent it is applicable. I understand that I am responsible for all charges, or portions thereof, not covered by insurance or other sources. Patient refunds will be distributed only after balances at all [facility] facilities are paid.     H. Communication Authorization:  I hereby authorize [facility] and its representatives, along with any billing service   or  who may work on their behalf, to contact me on   my cell phone and/or home phone using pre- recorded messages, artificial voice messages, automatic telephone dialing devices or other computer assisted technology, or by electronic      mail, text messaging, or by any other form of electronic communication. This includes, but is not limited to, appointment reminders, yearly physical exam reminders, preventive care reminders, patient campaigns, welcome calls, and calls about account balances on my account or any account on which I am listed as a guarantor. I understand I have the right to opt out of these communications at any time.      Relationship  Between  Facility and  Provider:      I understand that some, but not all, providers furnishing services to the  patient are not employees or agents of [facility]. The patient is under the care and supervision of his/her attending physician, and it is the responsibility of the facility and its nursing staff to carry out the instructions of such physicians. It is the responsibility of the patient's physician/designee to obtain the patient's informed consent, when required, for medical or surgical treatment, special diagnostic or therapeutic procedures, or hospital services rendered for the patient under the special instructions of the physician/designee.           REGISTRATION AUTHORIZATION  Form No. 48049 (Rev. 3/25/2024)    Page 2 of 3                       Immunizations: [facility] shares immunization information with state sponsored health departments to help you and your doctor keep track of your immunization records. By signing, you consent to have this information shared with the health department in your state:                                [place] - LINKS ([place] Immunization Network for Kids Statewide)                                [place] - MIIX ([place] Immunization Information eXchange)                                [place] - ImmPRINT (Immunization Patient Registry with Integrated Technology)     TERM: This authorization is valid for this and subsequent care/treatment I receive at [facility] and will remain valid unless/until revoked in writing by me.     [facility]: As used in this document, [facility] means all [facility] owned and managed facilities, including, but not limited to, all health centers, surgery centers, clinics, urgent care centers, and hospitals.         [facility] complies with applicable Federal civil rights laws and does not discriminate on the basis of race, color, national origin, age, disability, or sex.  ATENCIÓN: si mlla kandy, tiene a chavis disposición servicios gratuitos de asistencia lingüística. Miky dillon [phone].  [facility] Ý: N?u b?n nói Ti?ng Vi?t, có  các d?ch v? h? tr? ngôn ng? mi?n phí dành cho b?n. G?i s? [phone].        REGISTRATION AUTHORIZATION  Form No. 68766 (Rev. 3/25/2024)   Page 3 of 3

## 2025-02-04 NOTE — PATIENT INSTRUCTIONS
Counseling and Referral of Other Preventative  (Italic type indicates deductible and co-insurance are waived)    Patient Name: Maddie Ya  Today's Date: 2/4/2025    Health Maintenance         Date Due Completion Date    TETANUS VACCINE Never done ---    Shingles Vaccine (1 of 2) Never done ---    RSV Vaccine (Age 60+ and Pregnant patients) (1 - Risk 60-74 years 1-dose series) Never done ---    Mammogram 07/28/2024 7/28/2023    Influenza Vaccine (1) 09/01/2024 12/7/2023    Override on 10/14/2022: Done    COVID-19 Vaccine (4 - 2024-25 season) 09/01/2024 11/16/2021    Foot Exam 01/30/2025 1/30/2024    DEXA Scan 03/09/2025 3/9/2020    Hemoglobin A1c 06/05/2025 12/5/2024    LDCT Lung Screen 09/20/2025 9/20/2024    Diabetic Eye Exam 11/04/2025 11/4/2024    Diabetes Urine Screening 12/05/2025 12/5/2024    Lipid Panel 12/05/2025 12/5/2024    High Dose Statin 02/04/2026 2/4/2025    Aspirin/Antiplatelet Therapy 02/04/2026 2/4/2025    Colorectal Cancer Screening 10/14/2029 10/14/2024          Orders Placed This Encounter   Procedures    X-Ray Chest PA And Lateral     The following information is provided to all patients.  This information is to help you find resources for any of the problems found today that may be affecting your health:                  Living healthy guide: ms.gov    Understanding Diabetes: www.diabetes.org      Eating healthy: www.cdc.gov/healthyweight      CDC home safety checklist: www.cdc.gov/steadi/patient.html      Agency on Aging: ms.gov    Alcoholics anonymous (AA): www.aa.org      Physical Activity: www.irving.nih.gov/we9yxzc      Tobacco use: ms.gov

## 2025-02-05 ENCOUNTER — TELEPHONE (OUTPATIENT)
Dept: FAMILY MEDICINE | Facility: CLINIC | Age: 75
End: 2025-02-05
Payer: MEDICARE

## 2025-02-05 NOTE — TELEPHONE ENCOUNTER
Contacted pt in regards to chest x-ray. Informed pt of results. Pt voiced understanding and had no further questions.     ----- Message from Adriana Montero MD sent at 2/4/2025  5:02 PM CST -----  Please call patient regarding XR results. CXR does not reveal an evidence of pneumonia. Thanks!

## 2025-02-18 DIAGNOSIS — E11.9 TYPE 2 DIABETES MELLITUS WITHOUT COMPLICATION, WITHOUT LONG-TERM CURRENT USE OF INSULIN: ICD-10-CM

## 2025-02-18 RX ORDER — GLIPIZIDE 2.5 MG/1
5 TABLET, EXTENDED RELEASE ORAL
Qty: 180 TABLET | Refills: 1 | Status: SHIPPED | OUTPATIENT
Start: 2025-02-18 | End: 2026-02-18

## 2025-04-21 ENCOUNTER — PATIENT MESSAGE (OUTPATIENT)
Dept: ADMINISTRATIVE | Facility: HOSPITAL | Age: 75
End: 2025-04-21
Payer: MEDICARE

## 2025-04-23 ENCOUNTER — PATIENT OUTREACH (OUTPATIENT)
Facility: HOSPITAL | Age: 75
End: 2025-04-23
Payer: MEDICARE

## 2025-04-23 DIAGNOSIS — Z78.0 POSTMENOPAUSAL STATUS: Primary | ICD-10-CM

## 2025-04-23 NOTE — PROGRESS NOTES
Population Health Chart Review & Patient Outreach Details        Health Maintenance Topics Addressed and Outreach Outcomes / Actions Taken:  DEXA [x] Received a campaign message, patient would like to be referred for a DEXA at [facility].     [x] Referral put in. Awaiting appointment.